# Patient Record
Sex: MALE | Race: WHITE | Employment: OTHER | URBAN - METROPOLITAN AREA
[De-identification: names, ages, dates, MRNs, and addresses within clinical notes are randomized per-mention and may not be internally consistent; named-entity substitution may affect disease eponyms.]

---

## 2017-01-04 ENCOUNTER — OFFICE VISIT (OUTPATIENT)
Dept: INTERNAL MEDICINE CLINIC | Facility: CLINIC | Age: 42
End: 2017-01-04

## 2017-01-04 VITALS
WEIGHT: 230.63 LBS | RESPIRATION RATE: 16 BRPM | DIASTOLIC BLOOD PRESSURE: 72 MMHG | SYSTOLIC BLOOD PRESSURE: 114 MMHG | TEMPERATURE: 98 F | HEIGHT: 73 IN | HEART RATE: 87 BPM | BODY MASS INDEX: 30.57 KG/M2 | OXYGEN SATURATION: 96 %

## 2017-01-04 DIAGNOSIS — E66.9 OBESITY (BMI 30-39.9): ICD-10-CM

## 2017-01-04 DIAGNOSIS — Z51.81 THERAPEUTIC DRUG MONITORING: Primary | ICD-10-CM

## 2017-01-04 DIAGNOSIS — Z86.69 HISTORY OF SLEEP APNEA: ICD-10-CM

## 2017-01-04 PROCEDURE — 99214 OFFICE O/P EST MOD 30 MIN: CPT | Performed by: NURSE PRACTITIONER

## 2017-01-04 PROCEDURE — 93000 ELECTROCARDIOGRAM COMPLETE: CPT | Performed by: NURSE PRACTITIONER

## 2017-01-04 RX ORDER — PHENTERMINE HYDROCHLORIDE 37.5 MG/1
37.5 TABLET ORAL
Qty: 30 TABLET | Refills: 0 | Status: SHIPPED | OUTPATIENT
Start: 2017-01-04 | End: 2017-03-09

## 2017-01-04 NOTE — PATIENT INSTRUCTIONS
Welcome to the [facility]. ..your Lifestyle Renovation begins now! Thank you for placing your trust in our health care team, I look forward to working with you along this journey to better health!     Next steps:     1.  Schedule a personal n another comparable alternative can be done in your home or place of work with little time commitment. Don't forget to schedule your 1 month follow-up visit!

## 2017-01-04 NOTE — PROGRESS NOTES
Tamika Prescott is a 35year old male new to our office today. Referred by PCP, ANA Spicer.    S:  Patient presents today with c/o weight gain starting in his 35s. Patient sees excess weight as having a mild effect on health and quality of life.  R 230 lb 9.6 oz  BMI 30.43 kg/m2  SpO2 96%, Percent body fat: M >25% 27.4%.   GENERAL: well developed, well nourished, in no apparent distress, obese  SKIN: warm, pink, dry without rashes to exposed area  EYES: conjunctiva pink, sclera non icteric, PERRLA  HE total) by mouth every morning before breakfast.  Dispense: 30 tablet; Refill: 0    3. History of sleep apnea  - resolved per patient report after surgery, no CPAP use        Reviewed [facility] patient contract.  Readiness for Lifestyle change: 10/10, Interest in little which would hinder weight loss. When you set the aidan up chose 1-2 lbs/week as a goal.  Keeping a paper food journal is an option as well. Continue or start exercising to help establish a routine.  If not already exercising begin with 1 day and pro

## 2017-01-12 ENCOUNTER — APPOINTMENT (OUTPATIENT)
Dept: LAB | Age: 42
End: 2017-01-12
Attending: NURSE PRACTITIONER
Payer: COMMERCIAL

## 2017-01-12 DIAGNOSIS — Z51.81 THERAPEUTIC DRUG MONITORING: ICD-10-CM

## 2017-01-12 DIAGNOSIS — E66.9 OBESITY (BMI 30-39.9): ICD-10-CM

## 2017-01-12 LAB
25-HYDROXYVITAMIN D (TOTAL): 22.7 NG/ML (ref 30–100)
BUN BLD-MCNC: 18 MG/DL (ref 8–20)
CALCIUM BLD-MCNC: 8.6 MG/DL (ref 8.3–10.3)
CHLORIDE: 106 MMOL/L (ref 101–111)
CO2: 28 MMOL/L (ref 22–32)
CREAT BLD-MCNC: 1.04 MG/DL (ref 0.7–1.3)
EST. AVERAGE GLUCOSE BLD GHB EST-MCNC: 117 MG/DL (ref 68–126)
GLUCOSE BLD-MCNC: 91 MG/DL (ref 70–99)
HAV AB SERPL IA-ACNC: 401 PG/ML (ref 193–986)
HBA1C MFR BLD HPLC: 5.7 % (ref ?–5.7)
HEMOLYSIS: 1
ICTERUS: 1
LIPEMIA: 1
POTASSIUM SERPL-SCNC: 4.4 MMOL/L (ref 3.6–5.1)
SODIUM SERPL-SCNC: 142 MMOL/L (ref 136–144)
TSI SER-ACNC: 0.99 MIU/ML (ref 0.35–5.5)

## 2017-01-12 PROCEDURE — 36415 COLL VENOUS BLD VENIPUNCTURE: CPT

## 2017-01-12 PROCEDURE — 83036 HEMOGLOBIN GLYCOSYLATED A1C: CPT

## 2017-01-12 PROCEDURE — 82397 CHEMILUMINESCENT ASSAY: CPT

## 2017-01-12 PROCEDURE — 80048 BASIC METABOLIC PNL TOTAL CA: CPT

## 2017-01-12 PROCEDURE — 82306 VITAMIN D 25 HYDROXY: CPT

## 2017-01-12 PROCEDURE — 82607 VITAMIN B-12: CPT

## 2017-01-12 PROCEDURE — 84443 ASSAY THYROID STIM HORMONE: CPT

## 2017-01-19 LAB — LEPTIN: 6.7 NG/ML

## 2017-01-23 ENCOUNTER — OFFICE VISIT (OUTPATIENT)
Dept: INTERNAL MEDICINE CLINIC | Facility: CLINIC | Age: 42
End: 2017-01-23

## 2017-01-23 VITALS — BODY MASS INDEX: 30 KG/M2 | WEIGHT: 229 LBS

## 2017-01-23 DIAGNOSIS — E66.9 OBESITY (BMI 30.0-34.9): ICD-10-CM

## 2017-01-23 PROCEDURE — 97802 MEDICAL NUTRITION INDIV IN: CPT | Performed by: DIETITIAN, REGISTERED

## 2017-01-23 NOTE — PROGRESS NOTES
INITIAL OUTPATIENT NUTRITION CONSULTATION    Nutrition Assessment    Medical Diagnosis: Obesity    Client Hx: 35year old male,  with son, works FT    Problem List as of 1/23/2017        Respiratory    BERTO (obstructive sleep apnea)       Mental Final   ----------    HDL CHOLESTEROL   Date Value Ref Range Status   10/18/2013 34* > OR = 40 mg/dL Final   ----------  HDL CHOL   Date Value Ref Range Status   12/02/2013 30* mg/dL Final   Comment:   <26    mg/dL  Highest CHD risk  25-35  mg/dL  High CHD Discussed importance of eating breakfast and then every 4-5 hours during the day. Sample breakfast options were listed. Is highly physically active. Drinking a protein shake after hockey rather than eating fast food was encouraged.   Portions on starches

## 2017-02-01 ENCOUNTER — OFFICE VISIT (OUTPATIENT)
Dept: INTERNAL MEDICINE CLINIC | Facility: CLINIC | Age: 42
End: 2017-02-01

## 2017-02-01 VITALS
RESPIRATION RATE: 16 BRPM | HEART RATE: 70 BPM | BODY MASS INDEX: 29.82 KG/M2 | WEIGHT: 225 LBS | HEIGHT: 73 IN | DIASTOLIC BLOOD PRESSURE: 60 MMHG | SYSTOLIC BLOOD PRESSURE: 106 MMHG

## 2017-02-01 DIAGNOSIS — E66.9 OBESITY (BMI 30-39.9): ICD-10-CM

## 2017-02-01 DIAGNOSIS — Z51.81 THERAPEUTIC DRUG MONITORING: Primary | ICD-10-CM

## 2017-02-01 DIAGNOSIS — R73.03 PREDIABETES: ICD-10-CM

## 2017-02-01 PROCEDURE — 99214 OFFICE O/P EST MOD 30 MIN: CPT | Performed by: NURSE PRACTITIONER

## 2017-02-01 RX ORDER — LACTOBACIL 2/BIFIDO 1/S.THERMO 450B CELL
1 PACKET (EA) ORAL DAILY
Qty: 60 CAPSULE | Refills: 3 | COMMUNITY
Start: 2017-02-01 | End: 2017-05-05

## 2017-02-01 NOTE — PROGRESS NOTES
Natanael Reyes is a 35year old male presents today for 1 month follow-up on medical weight loss program for the treatment of overweight, obesity, or morbid obesity. New onset prediabetes and Vitamin D deficiency.     S:  Current weight Wt Readings from Last Meds & Refills for this Visit:  Signed Prescriptions Disp Refills    Probiotic Product (VSL#3) Oral Cap 60 capsule 3      Sig: Take 1 capsule by mouth daily.            Imaging & Consults:  None      Plan:  Patient has lost 5# since LOV 1 month ago on phent You have been diagnosed with prediabetes. This means that the level of sugar (glucose) in your blood is too high. If you have prediabetes, you are at risk for developing type 2 diabetes.  Type 2 diabetes is diagnosed when the level of glucose in the blood r · Fasting glucose test. Blood is taken and tested after you have fasted (not eaten) for at least 8 hours. A normal test result is 99 milligrams per deciliter (mg/dL) or lower. Prediabetes is 100 mg/dL to 125 mg/dL. Diabetes is 126 mg/dL or higher.   · Gluco © 5195-9369 The [address] [place] [address], [address]. All rights reserved. This information is not intended as a substitute for professional medical care. Always follow your healthcare professional's instructions.           Weigh © 3442-9058 [address], [address]. All rights reserved. This information is not intended as a substitute for professional medical care. Always follow your healthcare professional's instructions.             Rick

## 2017-02-01 NOTE — PATIENT INSTRUCTIONS
Congratulations on your 5# weight loss! Continue making lifestyle changes that focus on good nutrition, regular exercise and stress management. Today we reviewed your labs with findings of: prediabetes, vitamin D deficiency.     Medication Plan: Irwin Pierson Prediabetes is a disease where the body’s cells have trouble using glucose in the blood for energy. As a result, too much glucose stays in the blood and can affect how your heart and blood vessels work.  Without changes in diet and lifestyle, the problem ca The best way to treat prediabetes is to lose at least 5% to 7% of your current weight and be more physically active by getting at least 30 minutes of exercise 5 days a week. These changes help the body’s cells use blood sugar better.  Even a small amount of Eat more fiber  High-fiber foods are digested more slowly than low-fiber foods, so you feel full longer. Try to get at least 25 grams of fiber each day for a 2000 calorie diet.  Foods high in fiber include:  · Vegetables and fruits  · Whole-grain or bran

## 2017-03-09 ENCOUNTER — OFFICE VISIT (OUTPATIENT)
Dept: INTERNAL MEDICINE CLINIC | Facility: CLINIC | Age: 42
End: 2017-03-09

## 2017-03-09 VITALS
BODY MASS INDEX: 29.03 KG/M2 | SYSTOLIC BLOOD PRESSURE: 120 MMHG | HEART RATE: 62 BPM | WEIGHT: 219 LBS | HEIGHT: 73 IN | RESPIRATION RATE: 16 BRPM | DIASTOLIC BLOOD PRESSURE: 80 MMHG

## 2017-03-09 DIAGNOSIS — Z51.81 THERAPEUTIC DRUG MONITORING: Primary | ICD-10-CM

## 2017-03-09 DIAGNOSIS — E66.9 OBESITY (BMI 30-39.9): ICD-10-CM

## 2017-03-09 PROCEDURE — 99213 OFFICE O/P EST LOW 20 MIN: CPT | Performed by: NURSE PRACTITIONER

## 2017-03-09 NOTE — PROGRESS NOTES
Lori French is a 35year old male presents today for 2 month follow-up on medical weight loss program for the treatment of overweight, obesity, or morbid obesity.   S:  Current weight Wt Readings from Last 6 Encounters:  03/09/17 : 219 lb  02/01/17 : 225 Patient has lost 6# since LOV 1 month ago with lifestyle changes with a total weight loss of 11# since initial consult on 1/4/2017 with initial weight of 230#. Lab hx: prediabetes, vitamin D deficiency.   Continue on lifestyle changes with diet and exercis Although daily physical activity, like walking, swimming and aerobics are essential to good heart health and weight management, combining muscle building weight training with cardiovascular exercise, gives you an unbeatable combination to lose fat and keep medical problems such as sleep apnea or hyperthyroidism   restless leg syndrome (muscles in your lower legs twitch or tense up during sleep).    use of caffeine or other stimulants   use of alcohol, other depressants, or sedatives, which can relax you but l your mental and physical condition   your medical and mental health history, and your family's history   your job and travel patterns. Your healthcare provider may also ask your spouse, bed partner, or other family members about your sleep habits.  After Your healthcare provider may recommend relaxation techniques, changes in diet, cutting out caffeine, and a healthy lifestyle that includes exercise. Your provider also will probably discuss good sleep habits and a regular sleep routine.    How long will the Try not to focus on falling asleep. For example, don't keep checking the clock and worry about why you are not asleep yet. If you are awake for more than 20 minutes, leave the bed and do not go back to bed until you feel ready to sleep.    Try to reduce str

## 2017-03-09 NOTE — PATIENT INSTRUCTIONS
Congratulations on your 6# weight loss! Continue making lifestyle changes that focus on good nutrition, regular exercise and stress management. Agreed upon goal/s before next office visit include:  Add resistance training 2x/week to help build muscle and Burn more calories. Unlike fat, muscle beefs up your metabolism to help you burn about 70% more calories than fat can. 2. Improve appearance. When done properly, strength training can greatly improve your posture and help to prevent joint pain.   3. Build (COPD) or heart failure   poor sleep habits, including going to bed at different times or in a noisy environment, or eating or working in bed before sleeping   changes in sleep patterns because of different work hours or travel (jet lag).    Insomnia may be you woke up   what time you got up in the morning   your thoughts about the quality of your sleep   whether anything unusual happened. Your healthcare provider may suggest that you sleep overnight in a sleep center.  At the sleep center you may have a con insomnia. Follow your provider's instructions for follow-up visits. How can I help prevent insomnia? Practice good sleep hygiene:   Establish a regular bedtime and wake-up time and stick to them even on weekends. Avoid taking naps.    Exercise regul reviewed periodically and is subject to change as new health information becomes available.  The information is intended to inform and educate and is not a replacement for medical evaluation, advice, diagnosis or treatment by a healthcare professional.   Ad

## 2017-04-05 ENCOUNTER — OFFICE VISIT (OUTPATIENT)
Dept: INTERNAL MEDICINE CLINIC | Facility: CLINIC | Age: 42
End: 2017-04-05

## 2017-04-05 VITALS
RESPIRATION RATE: 16 BRPM | BODY MASS INDEX: 29.42 KG/M2 | HEART RATE: 64 BPM | HEIGHT: 73 IN | WEIGHT: 222 LBS | SYSTOLIC BLOOD PRESSURE: 130 MMHG | DIASTOLIC BLOOD PRESSURE: 76 MMHG

## 2017-04-05 DIAGNOSIS — E66.9 OBESITY (BMI 30.0-34.9): ICD-10-CM

## 2017-04-05 DIAGNOSIS — Z51.81 THERAPEUTIC DRUG MONITORING: Primary | ICD-10-CM

## 2017-04-05 PROCEDURE — 99213 OFFICE O/P EST LOW 20 MIN: CPT | Performed by: NURSE PRACTITIONER

## 2017-04-05 NOTE — PATIENT INSTRUCTIONS
Continue making lifestyle changes that focus on good nutrition, regular exercise and stress management. Agreed upon goal/s before next office visit include: Pack healthy snack options during the day and note hunger cues.   Add resistance workouts 2x/week crackers  · Chocolate bars  · Cream-filled cookies  Date Last Reviewed: 6/8/2015  © 1563-1489 [address], [address] [place] [address]. All rights reserved.  This information is not intended as a substitute for professional medic hunger? Think of alternatives to eating for when these temptations arise.  Some ideas are:  Drink a glass of cold water or another zero-calorie beverage   Take a walk to change the scenery   Do another form of exercise (sit-ups, running, swimming, tennis,

## 2017-04-05 NOTE — PROGRESS NOTES
Natanael Reyes is a 38year old male presents today for 3 month follow-up on medical weight loss program for the treatment of overweight, obesity, or morbid obesity.     S:  Current weight Wt Readings from Last 6 Encounters:  04/05/17 : 222 lb  03/09/17 : 21 diagnosis)  Obesity (bmi 30.0-34.9)    No orders of the defined types were placed in this encounter.        Meds & Refills for this Visit:  No prescriptions requested or ordered in this encounter    Imaging & Consults:  None      Plan:  Patient has lost -3# you’re full. Pack snacks ahead of time  The body’s fuel runs out within several hours after eating a meal. If you don’t eat, you’ll feel your energy level drop. You may also notice that you’re less focused and alert.  Try packing snacks to bring with you t Irritability   Headache   Low energy/fatigue   Difficulty concentrating  How Does the Scale Work? Before you eat, take a moment to rate your hunger. Think about how hungry you physically feel. Your goal is to eat between levels 4 and 6.  This means you a month (around 5/5/2017) for weight management . Patient verbalizes understanding.

## 2017-05-05 ENCOUNTER — LAB ENCOUNTER (OUTPATIENT)
Dept: LAB | Age: 42
End: 2017-05-05
Attending: INTERNAL MEDICINE
Payer: COMMERCIAL

## 2017-05-05 ENCOUNTER — TELEPHONE (OUTPATIENT)
Dept: INTERNAL MEDICINE CLINIC | Facility: CLINIC | Age: 42
End: 2017-05-05

## 2017-05-05 ENCOUNTER — OFFICE VISIT (OUTPATIENT)
Dept: FAMILY MEDICINE CLINIC | Facility: CLINIC | Age: 42
End: 2017-05-05

## 2017-05-05 VITALS
RESPIRATION RATE: 16 BRPM | TEMPERATURE: 98 F | SYSTOLIC BLOOD PRESSURE: 134 MMHG | HEIGHT: 73 IN | WEIGHT: 222 LBS | HEART RATE: 64 BPM | BODY MASS INDEX: 29.42 KG/M2 | DIASTOLIC BLOOD PRESSURE: 82 MMHG

## 2017-05-05 DIAGNOSIS — R19.7 DIARRHEA DUE TO MALABSORPTION: ICD-10-CM

## 2017-05-05 DIAGNOSIS — K90.9 DIARRHEA DUE TO MALABSORPTION: ICD-10-CM

## 2017-05-05 DIAGNOSIS — F41.9 ANXIETY: ICD-10-CM

## 2017-05-05 DIAGNOSIS — Z00.01 ENCOUNTER FOR GENERAL ADULT MEDICAL EXAMINATION WITH ABNORMAL FINDINGS: Primary | ICD-10-CM

## 2017-05-05 DIAGNOSIS — G47.9 SLEEP DISORDER: ICD-10-CM

## 2017-05-05 DIAGNOSIS — J32.1 CHRONIC FRONTAL SINUSITIS: ICD-10-CM

## 2017-05-05 DIAGNOSIS — J30.2 SEASONAL ALLERGIC RHINITIS, UNSPECIFIED ALLERGIC RHINITIS TRIGGER: ICD-10-CM

## 2017-05-05 PROBLEM — E55.9 VITAMIN D DEFICIENCY: Status: ACTIVE | Noted: 2017-05-05

## 2017-05-05 PROCEDURE — 87209 SMEAR COMPLEX STAIN: CPT | Performed by: INTERNAL MEDICINE

## 2017-05-05 PROCEDURE — 87427 SHIGA-LIKE TOXIN AG IA: CPT | Performed by: INTERNAL MEDICINE

## 2017-05-05 PROCEDURE — 87493 C DIFF AMPLIFIED PROBE: CPT | Performed by: INTERNAL MEDICINE

## 2017-05-05 PROCEDURE — 99396 PREV VISIT EST AGE 40-64: CPT | Performed by: INTERNAL MEDICINE

## 2017-05-05 PROCEDURE — 99214 OFFICE O/P EST MOD 30 MIN: CPT | Performed by: INTERNAL MEDICINE

## 2017-05-05 PROCEDURE — 87045 FECES CULTURE AEROBIC BACT: CPT | Performed by: INTERNAL MEDICINE

## 2017-05-05 PROCEDURE — 87269 GIARDIA AG IF: CPT | Performed by: INTERNAL MEDICINE

## 2017-05-05 PROCEDURE — 87177 OVA AND PARASITES SMEARS: CPT | Performed by: INTERNAL MEDICINE

## 2017-05-05 PROCEDURE — 87046 STOOL CULTR AEROBIC BACT EA: CPT | Performed by: INTERNAL MEDICINE

## 2017-05-05 RX ORDER — IPRATROPIUM BROMIDE 42 UG/1
2 SPRAY, METERED NASAL 4 TIMES DAILY
Qty: 15 ML | Refills: 0 | Status: SHIPPED | OUTPATIENT
Start: 2017-05-05 | End: 2017-12-11 | Stop reason: ALTCHOICE

## 2017-05-05 RX ORDER — CLONAZEPAM 0.5 MG/1
0.5 TABLET ORAL NIGHTLY PRN
Qty: 30 TABLET | Refills: 3 | Status: SHIPPED | COMMUNITY
Start: 2017-05-05 | End: 2017-12-06

## 2017-05-05 RX ORDER — METHYLPREDNISOLONE 4 MG/1
TABLET ORAL
Qty: 1 KIT | Refills: 0 | Status: SHIPPED | OUTPATIENT
Start: 2017-05-05 | End: 2017-08-22

## 2017-05-05 RX ORDER — LORATADINE AND PSEUDOEPHEDRINE 10; 240 MG/1; MG/1
1 TABLET, EXTENDED RELEASE ORAL DAILY
Qty: 20 TABLET | Refills: 1 | Status: SHIPPED | OUTPATIENT
Start: 2017-05-05 | End: 2017-12-11 | Stop reason: ALTCHOICE

## 2017-05-05 RX ORDER — AZITHROMYCIN 250 MG/1
TABLET, FILM COATED ORAL
Qty: 8 TABLET | Refills: 0 | Status: SHIPPED | OUTPATIENT
Start: 2017-05-05 | End: 2017-08-22

## 2017-05-05 NOTE — PATIENT INSTRUCTIONS
Thank you for choosing Cira Whittington MD at [place] [address]  To Do: Lori Saliva  1. Sleep meds resume clonazepam  2. Sinus meds sent to [facility]  3. Earwax L ear- use debrox drops and see ent dr Hansel Haro if not better  4. Stool test  5.  See gi doc dr David Martines outcomes when eszopiclone (2 and 3 mg) and zolpidem were compared directly.   When selecting among various sedative-hypnotic medications, we suggest choosing on the basis of the type of insomnia (ie, sleep onset or sleep maintenance) and the duration of eff movement (REM) sleep [64]. In addition, they decrease anxiety, impair memory, and have anticonvulsive properties.   Benzodiazepines commonly used for the treatment of insomnia include triazolam, estazolam, lorazepam, temazepam, flurazepam, and quazepam. A p placebo. Nonbenzodiazepines commonly used to treat insomnia include zaleplon, zolpidem, eszopiclone, and zolpidem extended release (table 6): ? Zaleplon has a very short half-life of about one hour.  As a result, it is effective for patients who have diffi zolpidem extended release are relatively few, with the most common being headache, somnolence, and dizziness, which can in turn lead to falls [63].  In January of 2013, the FDA recommended use of a lower dose in women than had been previously recommended [7 eszopiclone for six months had improved quality of life, decreased work limitation, and improved sleep compared with placebo [76]. This persisted throughout the trial and the subsequent six month open label extension.  In another randomized trial of 1018 pa non-24-hour sleep-wake disorder, a circadian sleep-wake rhythm disorder that occurs primarily in blind individuals [87,88].   Ramelteon binds to melatonin receptors expressed in the suprachiasmatic nucleus with much higher affinity than melatonin itself and year of treatment; improvements in subjective sleep persisted at one year. The most common adverse effect was somnolence, which was more common in patients treated with suvorexant than placebo (13 versus 3 percent).  There was a trend toward rebound insomni recommended for routine use in patients with insomnia. These include antidepressants, diphenhydramine, antipsychotics, and barbiturates.   Antidepressants — One antidepressant, doxepin, has been approved by the FDA at doses of 3 and 6 mg primarily for the t guideline includes doxepin among the drugs listed as options for management sleep maintenance in chronic insomnia but suggests against use of trazodone based on paucity of data and the small effect sizes observed in the single randomized trial [59].    Diph between any herbal medicine and placebo on any of 13 clinical efficacy measures of insomnia [101]. The majority of the trials (11 out of 14) studied valerian; chamomile, kava, and wuling were studied in one trial each.  Unlike the other herbals studied, michelle insomnia may occur when some short-acting medications are discontinued.  Less common adverse effects include complex sleep-related behaviors (eg, sleep walking, driving, making telephone calls, eating, or having sex while not fully awake), anterograde amnes pneumonia associated with benzodiazepines and the nonbenzodiazepine zopiclone, possibly related to modification of ALEXANDR type A activity during infection [113,114].  A study in mice suggested that this risk could extend to all hypnotics that act at this site communication was issued for eszopiclone, based on data that the 2 and 3 mg doses may be associated with impairment of driving skills, memory, and coordination lasting more than 11 hours without subjective awareness in some patients [115].  A starting dose 26 patients with mild to moderate obstructive sleep apnea were given 40 mg of suvorexant or placebo for four consecutive nights in a crossover design, and the mean apnea-hypopnea index (AHI) increased by a mean of 2.7 (95% CI 0.2-5.1) after multiple doses potential confounders [132,133].   One of the larger studies suggested that hypnotic drugs (including frequently prescribed agents such as zolpidem and temazepam) were associated with an increased risk of both cancer and death, even at prescription levels o benzodiazepines and nonbenzodiazepines, while inducers of the CY system (eg, rifampin) may decrease the effectiveness of benzodiazepines and nonbenzodiazepines.   Ramelteon is metabolized by the CY system and, to a lesser extent, the CYP2C9 and CYP3 insomnia to receive CBT-I plus zolpidem or CBT-I alone for six weeks [135]. Both groups had decreased sleep onset latency, decreased wake time after sleep onset, and increased sleep efficiency when compared with baseline after six weeks.  However, there was therapy. • Please signup for MY CHART, which is electronic access to your record if you have not done so.  All your results will post on there.  [email or web address]  • You can NOW use the SocialMarthart to book your appointments with us, or consider request.    If Randell Berry MD orders a CT or MRI, it may take up to 10 business days to receive approval from your insurance company.  Once our office has called informing you that the insurance company approved your testing, please call [facility]

## 2017-05-05 NOTE — PROGRESS NOTES
Wellness Exam    REASON FOR VISIT:    Silvana Sacks is a 38year old male who presents for an [address]     Current Complaints: i'm just here for a physical  Flu Shot: declined   Health Maintenance Topics with due status: Overdue       Topic Opener : No    Scoring  Total Score: 0     PHQ-4: Over the LAST 2 WEEKS       Depression Screening (PHQ-2/PHQ-9): Over the LAST 2 WEEKS   Little interest or pleasure in doing things (over the last two weeks)?: Not at all    Feeling down, depressed, or hope tablet (0.5 mg total) by mouth nightly as needed for Anxiety (sleep).  Disp: 30 tablet Rfl: 3   azithromycin 250 MG Oral Tab 7 day course, 2 on day 1 then 1 daily after Disp: 8 tablet Rfl: 0   Loratadine-Pseudoephedrine ER (CLARITIN-D 24 HOUR)  MG Ora difficulty urinating. Musculoskeletal: Negative for arthralgias and no gait problem. Skin: Negative for color change and rash. Neurological: Negative for tremors, weakness and numbness. Hematological: Negative for adenopathy.  Does not bruise/bleed and ordered as follows:  Diagnoses and all orders for this visit:    Wellness  Labs reviewed  Normal  rec weight loss    Chronic frontal sinusitis  -     azithromycin 250 MG Oral Tab; 7 day course, 2 on day 1 then 1 daily after  -     Loratadine-Pseudoephe 08/09/2013, 09/06/2013   • HEP B, Adult 02/24/2014   • Tb Intradermal Test 08/02/2013, 08/09/2013       Influenza Annually   Pneumococcal if high risk   Td/Tdap once then every 10 years   HPV Males 11-21   Zoster (Shingles) 60 and older: one dose   Varicel Smoker                   Packs/Day: 0.00  Years:           Quit date: 08/01/2013    Smokeless Status: Never Used                        Alcohol Use: Yes                Comment: RARE    REVIEW OF SYSTEMS:   Review of Systems  Constitutional: Negative for fe hair  Psychiatric: anxiety  EXAM:   /82 mmHg  Pulse 64  Temp(Src) 97.5 °F (36.4 °C) (Temporal)  Resp 16  Ht 73\"  Wt 222 lb  BMI 29.30 kg/m2 Estimated body mass index is 29.3 kg/(m^2) as calculated from the following:    Height as of this encounter: 0.5 MG Oral Tab; Take 1 tablet (0.5 mg total) by mouth nightly as needed for Anxiety (sleep). No orders of the defined types were placed in this encounter.        Signed Prescriptions Disp Refills    ClonazePAM 0.5 MG Oral Tab 30 tablet 3      Sig: T Lab Facility: [id number]

## 2017-08-02 ENCOUNTER — TELEPHONE (OUTPATIENT)
Dept: FAMILY MEDICINE CLINIC | Facility: CLINIC | Age: 42
End: 2017-08-02

## 2017-08-02 PROBLEM — F51.01 PRIMARY INSOMNIA: Status: ACTIVE | Noted: 2017-08-02

## 2017-08-22 ENCOUNTER — OFFICE VISIT (OUTPATIENT)
Dept: FAMILY MEDICINE CLINIC | Facility: CLINIC | Age: 42
End: 2017-08-22

## 2017-08-22 VITALS
DIASTOLIC BLOOD PRESSURE: 76 MMHG | SYSTOLIC BLOOD PRESSURE: 122 MMHG | HEIGHT: 73 IN | TEMPERATURE: 98 F | HEART RATE: 74 BPM | RESPIRATION RATE: 16 BRPM | WEIGHT: 228 LBS | BODY MASS INDEX: 30.22 KG/M2

## 2017-08-22 DIAGNOSIS — R10.2 PELVIC PAIN: Primary | ICD-10-CM

## 2017-08-22 DIAGNOSIS — K90.9 DIARRHEA DUE TO MALABSORPTION: ICD-10-CM

## 2017-08-22 DIAGNOSIS — M25.552 LEFT HIP PAIN: ICD-10-CM

## 2017-08-22 DIAGNOSIS — M25.562 CHRONIC PAIN OF LEFT KNEE: ICD-10-CM

## 2017-08-22 DIAGNOSIS — R19.7 DIARRHEA DUE TO MALABSORPTION: ICD-10-CM

## 2017-08-22 DIAGNOSIS — G89.29 CHRONIC PAIN OF LEFT KNEE: ICD-10-CM

## 2017-08-22 PROCEDURE — 99214 OFFICE O/P EST MOD 30 MIN: CPT | Performed by: INTERNAL MEDICINE

## 2017-08-22 RX ORDER — DICLOFENAC SODIUM 75 MG/1
75 TABLET, DELAYED RELEASE ORAL 2 TIMES DAILY
Qty: 30 TABLET | Refills: 0 | Status: SHIPPED | OUTPATIENT
Start: 2017-08-22 | End: 2017-12-11 | Stop reason: ALTCHOICE

## 2017-08-22 NOTE — PROGRESS NOTES
[facility] Internal Medicine Office Note  Chief Complaint:   Patient presents with:   Follow - Up: groin pain  Knee Pain: Left knee x 1 month  Hip Pain: left side   diarrhea    HPI:   This is a 38year old male coming in for  HPI     Explosive rodri tablet Rfl: 1   Ipratropium Bromide (ATROVENT) 0.06 % Nasal Solution 2 sprays by Nasal route 4 (four) times daily. Disp: 15 mL Rfl: 0   clonazePAM 0.25 MG Oral Tablet Dispersible Take 1 tablet (0.25 mg total) by mouth nightly as needed.  Disp: 30 tablet Rfl knee acute on chronci pain ? OA check xray and refer to PT and ortho  -     XR KNEE (1 OR 2 VIEWS), LEFT (CPT=73560);  Future  -     OP REFERRAL TO [facility]  -     ORTHOPEDIC - INTERNAL    L hip pain new from strain of hip  rec nsaid b

## 2017-08-22 NOTE — PATIENT INSTRUCTIONS
Thank you for choosing Pia Jasmine MD at [place] [address]  To Do: Liam Lacrosse  1.  Joints  L knee pain likely from meniscal injury  Recommend xray Call central scheduling [phone] to schedule your test.  Most tests are done in Building [facility] inside E example: Holter Monitor, Cardiac Stress tests,Event Monitor, or 2D Echocardiograms)  •[facility] call [phone] usually in [address] [facility] in Clinic in [place] at [facility].  [address] Mon-Fri at 8am-7:30pm, and Sat/Sun 9am-430pm  Also at 2855 patients receiving maintenance medications need to be seen at least twice a year.

## 2017-08-23 ENCOUNTER — HOSPITAL ENCOUNTER (OUTPATIENT)
Dept: ULTRASOUND IMAGING | Age: 42
Discharge: HOME | End: 2017-08-23
Attending: INTERNAL MEDICINE
Payer: COMMERCIAL

## 2017-08-23 ENCOUNTER — HOSPITAL ENCOUNTER (OUTPATIENT)
Dept: GENERAL RADIOLOGY | Age: 42
Discharge: HOME | End: 2017-08-23
Attending: INTERNAL MEDICINE
Payer: COMMERCIAL

## 2017-08-23 DIAGNOSIS — M25.562 CHRONIC PAIN OF LEFT KNEE: ICD-10-CM

## 2017-08-23 DIAGNOSIS — R10.2 PELVIC PAIN: ICD-10-CM

## 2017-08-23 DIAGNOSIS — G89.29 CHRONIC PAIN OF LEFT KNEE: ICD-10-CM

## 2017-08-23 PROCEDURE — 73560 X-RAY EXAM OF KNEE 1 OR 2: CPT | Performed by: INTERNAL MEDICINE

## 2017-08-23 PROCEDURE — 76882 US LMTD JT/FCL EVL NVASC XTR: CPT | Performed by: INTERNAL MEDICINE

## 2017-08-24 PROBLEM — K40.90 RIGHT INGUINAL HERNIA: Status: ACTIVE | Noted: 2017-08-24

## 2017-08-24 PROBLEM — M17.11 ARTHRITIS OF RIGHT KNEE: Status: ACTIVE | Noted: 2017-08-24

## 2017-08-28 PROBLEM — M25.562 PATELLOFEMORAL JOINT PAIN, LEFT: Status: ACTIVE | Noted: 2017-08-28

## 2017-08-29 ENCOUNTER — OFFICE VISIT (OUTPATIENT)
Dept: SURGERY | Facility: CLINIC | Age: 42
End: 2017-08-29

## 2017-08-29 VITALS
BODY MASS INDEX: 29.82 KG/M2 | WEIGHT: 225 LBS | RESPIRATION RATE: 16 BRPM | DIASTOLIC BLOOD PRESSURE: 84 MMHG | HEART RATE: 76 BPM | SYSTOLIC BLOOD PRESSURE: 129 MMHG | HEIGHT: 73 IN

## 2017-08-29 DIAGNOSIS — G47.33 OSA (OBSTRUCTIVE SLEEP APNEA): ICD-10-CM

## 2017-08-29 DIAGNOSIS — K40.90 RIGHT INGUINAL HERNIA: Primary | ICD-10-CM

## 2017-08-29 PROCEDURE — 99243 OFF/OP CNSLTJ NEW/EST LOW 30: CPT | Performed by: SURGERY

## 2017-08-29 NOTE — H&P
New Patient Visit Note       Active Problems      1. Right inguinal hernia    2. BERTO (obstructive sleep apnea)        Chief Complaint   Patient presents with:  Hernia: NW PT ref by Dr Pattie Corbett for RT ing hernia- US done 8/23.  PT has LQ ABD PN (7/10) that radiat Packs/day: 0.00      Years: 0.00           Quit date: 8/1/2013    Smokeless tobacco: Never Used                        Alcohol use: Yes                Comment: RARE    Drug use:  No                 Current Outpatien 73\"   Wt 225 lb   BMI 29.69 kg/m²   Physical Exam   Constitutional: He appears well-developed and well-nourished. No distress. HENT:   Head: Normocephalic. Neck: Normal range of motion. No tracheal deviation present.    Cardiovascular: Normal rate and

## 2017-09-19 ENCOUNTER — TELEPHONE (OUTPATIENT)
Dept: FAMILY MEDICINE CLINIC | Facility: CLINIC | Age: 42
End: 2017-09-19

## 2017-09-19 NOTE — TELEPHONE ENCOUNTER
Pt sts not able to make apt today at 11:00am with Dr Vincenzo Mayes due to scheduling issue.   Pt did not want to reschedule, 3rd No show

## 2017-10-06 ENCOUNTER — OFFICE VISIT (OUTPATIENT)
Dept: SURGERY | Facility: CLINIC | Age: 42
End: 2017-10-06

## 2017-10-06 VITALS
BODY MASS INDEX: 29.82 KG/M2 | HEIGHT: 73 IN | SYSTOLIC BLOOD PRESSURE: 124 MMHG | TEMPERATURE: 98 F | HEART RATE: 62 BPM | WEIGHT: 225 LBS | DIASTOLIC BLOOD PRESSURE: 84 MMHG

## 2017-10-06 DIAGNOSIS — K40.90 RIGHT INGUINAL HERNIA: Primary | ICD-10-CM

## 2017-10-06 PROCEDURE — 99024 POSTOP FOLLOW-UP VISIT: CPT | Performed by: SURGERY

## 2017-10-06 NOTE — PROGRESS NOTES
Post Operative Visit Note       Active Problems  1. Right inguinal hernia         Chief Complaint   Patient presents with:  Post-Op: 9/14 inguinal hernia repair, minimal pain         History of Present Illness   Doing well. No pain. Back to work.   Normal for Anxiety (sleep). Disp: 30 tablet Rfl: 3   Diclofenac Sodium 75 MG Oral Tab EC Take 1 tablet (75 mg total) by mouth 2 (two) times daily.  Disp: 30 tablet Rfl: 0   Loratadine-Pseudoephedrine ER (CLARITIN-D 24 HOUR)  MG Oral Tablet 24 Hr Take 1 table Right inguinal hernia  (primary encounter diagnosis)    Doing well s/p lap RIH repair with mesh. Plan   · Do not lift more than 20 pounds for 2 weeks more. · Follow-up as needed. · All questions answered.          No orders of the defined types were

## 2017-12-06 NOTE — TELEPHONE ENCOUNTER
Requesting Clonazepam 0.5 mg  LOV: 8/22/17   RTC: 1 mos  Last Labs: n/a  Filled: 05/05/17 #30 with 3 refills    No future appointments. Please contact pt, pt is overdue for OV.  Thank you    [facility] website shows, last filled 9/19/2017 CLONAZEPAM 0.5M Supp

## 2017-12-08 RX ORDER — CLONAZEPAM 0.5 MG/1
0.5 TABLET ORAL NIGHTLY PRN
Qty: 30 TABLET | Refills: 1 | OUTPATIENT
Start: 2017-12-08 | End: 2017-12-11

## 2017-12-08 NOTE — TELEPHONE ENCOUNTER
Future Appointments  Date Time Provider Jorge Alberto Melchor   12/11/2017 12:00 PM Sky Todd PA-C [facility] [address] [facility] [address]

## 2017-12-11 ENCOUNTER — OFFICE VISIT (OUTPATIENT)
Dept: FAMILY MEDICINE CLINIC | Facility: CLINIC | Age: 42
End: 2017-12-11

## 2017-12-11 ENCOUNTER — LAB ENCOUNTER (OUTPATIENT)
Dept: LAB | Age: 42
End: 2017-12-11
Attending: PHYSICIAN ASSISTANT
Payer: COMMERCIAL

## 2017-12-11 VITALS
TEMPERATURE: 99 F | HEART RATE: 64 BPM | WEIGHT: 228 LBS | SYSTOLIC BLOOD PRESSURE: 122 MMHG | HEIGHT: 73 IN | RESPIRATION RATE: 16 BRPM | BODY MASS INDEX: 30.22 KG/M2 | DIASTOLIC BLOOD PRESSURE: 80 MMHG

## 2017-12-11 DIAGNOSIS — F51.01 PRIMARY INSOMNIA: ICD-10-CM

## 2017-12-11 DIAGNOSIS — Z01.89 ROUTINE LAB DRAW: ICD-10-CM

## 2017-12-11 DIAGNOSIS — R22.41 ANKLE MASS, RIGHT: Primary | ICD-10-CM

## 2017-12-11 DIAGNOSIS — R73.01 IFG (IMPAIRED FASTING GLUCOSE): ICD-10-CM

## 2017-12-11 DIAGNOSIS — E78.5 DYSLIPIDEMIA: ICD-10-CM

## 2017-12-11 PROCEDURE — 84439 ASSAY OF FREE THYROXINE: CPT | Performed by: PHYSICIAN ASSISTANT

## 2017-12-11 PROCEDURE — 80061 LIPID PANEL: CPT | Performed by: PHYSICIAN ASSISTANT

## 2017-12-11 PROCEDURE — 99214 OFFICE O/P EST MOD 30 MIN: CPT | Performed by: PHYSICIAN ASSISTANT

## 2017-12-11 PROCEDURE — 83036 HEMOGLOBIN GLYCOSYLATED A1C: CPT | Performed by: PHYSICIAN ASSISTANT

## 2017-12-11 PROCEDURE — 80050 GENERAL HEALTH PANEL: CPT | Performed by: PHYSICIAN ASSISTANT

## 2017-12-11 PROCEDURE — 36415 COLL VENOUS BLD VENIPUNCTURE: CPT | Performed by: PHYSICIAN ASSISTANT

## 2017-12-11 RX ORDER — CLONAZEPAM 0.5 MG/1
TABLET ORAL
Qty: 30 TABLET | Status: CANCELLED | OUTPATIENT
Start: 2017-12-11

## 2017-12-11 RX ORDER — CLONAZEPAM 0.5 MG/1
0.5 TABLET ORAL NIGHTLY PRN
Qty: 30 TABLET | Refills: 1 | Status: SHIPPED | OUTPATIENT
Start: 2017-12-11 | End: 2018-04-26

## 2017-12-11 NOTE — PROGRESS NOTES
[facility] Internal Medicine Progress Note    CC:  Patient presents with:   Anxiety      HPI:   HPI  Insomnia  Pt is here today to follow-up on his insomnia  He is taking clonazepam nightly as needed  He states it is working well, some mornings he 98.5 °F (36.9 °C) (Temporal)   Resp 16   Ht 73\"   Wt 228 lb   BMI 30.08 kg/m²  Body mass index is 30.08 kg/m². Physical Exam   Constitutional: He is oriented to person, place, and time and well-developed, well-nourished, and in no distress.    HENT:   Rig Patient/Caregiver Education: Patient/Caregiver Education: There are no barriers to learning. Medical education done. Outcome: Patient verbalizes understanding.     Problem List:  Patient Active Problem List:     GERD (gastroesophageal reflux disease)

## 2017-12-11 NOTE — PATIENT INSTRUCTIONS
Thank you for choosing Yessy Hilario PA-C at [place] [address]  To Do: [place]  1. Continue medications  2. US of ankle  3. Get lab work done  4.  Follow-up in 6 months  Effective 6/19/17 until November 2017  Due to Golden lopez risk of harm or side effects or medication interactions.  It is your duty and for your safety to discuss with the pharmacist and our office with questions, and to notify us and stop treatment if problems arise, but know that our intention is that the benef

## 2017-12-12 NOTE — TELEPHONE ENCOUNTER
Spoke to [facility]  pharmacist at [facility], called in prescription for Clonazepam 0.5mg, pharmacists verbalized understanding with intent to inform pt when ready to be picked up.      Medication Quantity Refills Start End   ClonazePAM 0.5 MG Oral Tab 30 ta

## 2017-12-13 DIAGNOSIS — R74.8 ELEVATED LIVER ENZYMES: Primary | ICD-10-CM

## 2017-12-13 NOTE — PROGRESS NOTES
A1C is stable, still consistent with pre diabetes  Elevated cholesterol a little worse than last year, recommend low carb diet and exercise  Elevated liver enzyme, needs the following test  CMP  HCV antibody  Hep B core antibody  Ceruloplasmin  Iron and TI

## 2017-12-20 ENCOUNTER — LAB ENCOUNTER (OUTPATIENT)
Dept: LAB | Facility: HOSPITAL | Age: 42
End: 2017-12-20
Attending: PHYSICIAN ASSISTANT
Payer: COMMERCIAL

## 2017-12-20 ENCOUNTER — HOSPITAL ENCOUNTER (OUTPATIENT)
Dept: ULTRASOUND IMAGING | Facility: HOSPITAL | Age: 42
Discharge: HOME | End: 2017-12-20
Attending: PHYSICIAN ASSISTANT
Payer: COMMERCIAL

## 2017-12-20 DIAGNOSIS — R74.8 ELEVATED LIVER ENZYMES: ICD-10-CM

## 2017-12-20 DIAGNOSIS — R22.41 ANKLE MASS, RIGHT: ICD-10-CM

## 2017-12-20 PROCEDURE — 36415 COLL VENOUS BLD VENIPUNCTURE: CPT

## 2017-12-20 PROCEDURE — 86709 HEPATITIS A IGM ANTIBODY: CPT

## 2017-12-20 PROCEDURE — 76882 US LMTD JT/FCL EVL NVASC XTR: CPT | Performed by: PHYSICIAN ASSISTANT

## 2017-12-20 PROCEDURE — 86803 HEPATITIS C AB TEST: CPT

## 2017-12-20 PROCEDURE — 83550 IRON BINDING TEST: CPT

## 2017-12-20 PROCEDURE — 76700 US EXAM ABDOM COMPLETE: CPT | Performed by: PHYSICIAN ASSISTANT

## 2017-12-20 PROCEDURE — 80053 COMPREHEN METABOLIC PANEL: CPT

## 2017-12-20 PROCEDURE — 86704 HEP B CORE ANTIBODY TOTAL: CPT

## 2017-12-20 PROCEDURE — 86706 HEP B SURFACE ANTIBODY: CPT

## 2017-12-20 PROCEDURE — 82105 ALPHA-FETOPROTEIN SERUM: CPT

## 2017-12-20 PROCEDURE — 83516 IMMUNOASSAY NONANTIBODY: CPT

## 2017-12-20 PROCEDURE — 87340 HEPATITIS B SURFACE AG IA: CPT

## 2017-12-20 PROCEDURE — 82728 ASSAY OF FERRITIN: CPT

## 2017-12-20 PROCEDURE — 86708 HEPATITIS A ANTIBODY: CPT

## 2017-12-20 PROCEDURE — 82390 ASSAY OF CERULOPLASMIN: CPT

## 2017-12-20 PROCEDURE — 83540 ASSAY OF IRON: CPT

## 2017-12-26 NOTE — PROGRESS NOTES
See results below. Spoke with patient regarding US results. Patient aware, all questions answered.  Patient verbalized understanding     Total time spent on chart: 4 mins      Referral placed (2)   Dr. Damaris YEPEZ.WebsiteDirections  Gastroenterologist  A

## 2017-12-26 NOTE — PROGRESS NOTES
+ fatty liver  There is also an area that was hard to visualize due to fatty liver, possible cyst vs hemangioma  Pt should follow-up with Dr. Lisandro Cao liver specialist

## 2018-01-17 ENCOUNTER — OFFICE VISIT (OUTPATIENT)
Dept: FAMILY MEDICINE CLINIC | Facility: CLINIC | Age: 43
End: 2018-01-17

## 2018-01-17 VITALS
WEIGHT: 221 LBS | HEART RATE: 76 BPM | OXYGEN SATURATION: 100 % | TEMPERATURE: 98 F | DIASTOLIC BLOOD PRESSURE: 70 MMHG | SYSTOLIC BLOOD PRESSURE: 124 MMHG | RESPIRATION RATE: 12 BRPM | HEIGHT: 73 IN | BODY MASS INDEX: 29.29 KG/M2

## 2018-01-17 DIAGNOSIS — R63.4 WEIGHT LOSS: Primary | ICD-10-CM

## 2018-01-17 DIAGNOSIS — R14.0 BLOATING: ICD-10-CM

## 2018-01-17 DIAGNOSIS — R63.0 LOSS OF APPETITE: ICD-10-CM

## 2018-01-17 PROCEDURE — 99213 OFFICE O/P EST LOW 20 MIN: CPT | Performed by: FAMILY MEDICINE

## 2018-01-17 NOTE — PROGRESS NOTES
HPI:    Patient ID: Benjy Rojas is a 38year old male. HPI  Patient states over the past couple of weeks he had lost some weight. He is not trying to lose weight intentionally.   Patient presents with:  Stomach Pain: patient states he gets \"ful x-ray.  I would recommend monitoring her weight and recheck in 1 month if all testing comes back okay. Orders Placed This Encounter      CBC, Platelet;  No Differential      Comp Metabolic Panel (14)    Meds This Visit:  No prescriptions requested or ord

## 2018-01-18 PROBLEM — R63.4 WEIGHT LOSS: Status: ACTIVE | Noted: 2018-01-18

## 2018-01-18 PROBLEM — R63.0 LOSS OF APPETITE: Status: ACTIVE | Noted: 2018-01-18

## 2018-01-18 PROBLEM — R14.0 BLOATING: Status: ACTIVE | Noted: 2018-01-18

## 2018-01-29 ENCOUNTER — LAB ENCOUNTER (OUTPATIENT)
Dept: LAB | Age: 43
End: 2018-01-29
Attending: FAMILY MEDICINE
Payer: COMMERCIAL

## 2018-01-29 DIAGNOSIS — R63.4 WEIGHT LOSS: ICD-10-CM

## 2018-01-29 LAB
ALBUMIN SERPL-MCNC: 4 G/DL (ref 3.5–4.8)
ALP LIVER SERPL-CCNC: 95 U/L (ref 45–117)
ALT SERPL-CCNC: 54 U/L (ref 17–63)
AST SERPL-CCNC: 27 U/L (ref 15–41)
BILIRUB SERPL-MCNC: 0.9 MG/DL (ref 0.1–2)
BUN BLD-MCNC: 15 MG/DL (ref 8–20)
CALCIUM BLD-MCNC: 8.8 MG/DL (ref 8.3–10.3)
CHLORIDE: 107 MMOL/L (ref 101–111)
CO2: 27 MMOL/L (ref 22–32)
CREAT BLD-MCNC: 1.09 MG/DL (ref 0.7–1.3)
ERYTHROCYTE [DISTWIDTH] IN BLOOD BY AUTOMATED COUNT: 13.5 % (ref 11.5–16)
GLUCOSE BLD-MCNC: 76 MG/DL (ref 70–99)
HCT VFR BLD AUTO: 43.1 % (ref 37–53)
HGB BLD-MCNC: 14.5 G/DL (ref 13–17)
M PROTEIN MFR SERPL ELPH: 6.9 G/DL (ref 6.1–8.3)
MCH RBC QN AUTO: 31.7 PG (ref 27–33.2)
MCHC RBC AUTO-ENTMCNC: 33.6 G/DL (ref 31–37)
MCV RBC AUTO: 94.3 FL (ref 80–99)
PLATELET # BLD AUTO: 289 10(3)UL (ref 150–450)
POTASSIUM SERPL-SCNC: 4.5 MMOL/L (ref 3.6–5.1)
RBC # BLD AUTO: 4.57 X10(6)UL (ref 4.3–5.7)
RED CELL DISTRIBUTION WIDTH-SD: 46.1 FL (ref 35.1–46.3)
SODIUM SERPL-SCNC: 141 MMOL/L (ref 136–144)
WBC # BLD AUTO: 5.6 X10(3) UL (ref 4–13)

## 2018-01-29 PROCEDURE — 85027 COMPLETE CBC AUTOMATED: CPT | Performed by: FAMILY MEDICINE

## 2018-01-29 PROCEDURE — 80053 COMPREHEN METABOLIC PANEL: CPT | Performed by: FAMILY MEDICINE

## 2018-01-29 PROCEDURE — 36415 COLL VENOUS BLD VENIPUNCTURE: CPT | Performed by: FAMILY MEDICINE

## 2018-02-08 PROBLEM — M25.571 ACUTE RIGHT ANKLE PAIN: Status: ACTIVE | Noted: 2018-02-08

## 2018-03-14 ENCOUNTER — HOSPITAL ENCOUNTER (OUTPATIENT)
Dept: GENERAL RADIOLOGY | Age: 43
Discharge: HOME | End: 2018-03-14
Attending: PHYSICIAN ASSISTANT
Payer: COMMERCIAL

## 2018-03-14 ENCOUNTER — LAB ENCOUNTER (OUTPATIENT)
Dept: LAB | Age: 43
End: 2018-03-14
Attending: PHYSICIAN ASSISTANT
Payer: COMMERCIAL

## 2018-03-14 ENCOUNTER — OFFICE VISIT (OUTPATIENT)
Dept: FAMILY MEDICINE CLINIC | Facility: CLINIC | Age: 43
End: 2018-03-14

## 2018-03-14 VITALS
TEMPERATURE: 98 F | RESPIRATION RATE: 16 BRPM | DIASTOLIC BLOOD PRESSURE: 80 MMHG | WEIGHT: 204 LBS | BODY MASS INDEX: 27.04 KG/M2 | SYSTOLIC BLOOD PRESSURE: 124 MMHG | HEART RATE: 60 BPM | HEIGHT: 73 IN

## 2018-03-14 DIAGNOSIS — R63.4 UNINTENTIONAL WEIGHT LOSS: ICD-10-CM

## 2018-03-14 DIAGNOSIS — R63.4 UNINTENTIONAL WEIGHT LOSS: Primary | ICD-10-CM

## 2018-03-14 DIAGNOSIS — J01.00 ACUTE MAXILLARY SINUSITIS, RECURRENCE NOT SPECIFIED: ICD-10-CM

## 2018-03-14 LAB
ALBUMIN SERPL-MCNC: 3.7 G/DL (ref 3.5–4.8)
ALP LIVER SERPL-CCNC: 89 U/L (ref 45–117)
ALT SERPL-CCNC: 46 U/L (ref 17–63)
AST SERPL-CCNC: 27 U/L (ref 15–41)
BASOPHILS # BLD AUTO: 0.07 X10(3) UL (ref 0–0.1)
BASOPHILS NFR BLD AUTO: 1.1 %
BILIRUB SERPL-MCNC: 0.2 MG/DL (ref 0.1–2)
BILIRUB UR QL STRIP.AUTO: NEGATIVE
BUN BLD-MCNC: 11 MG/DL (ref 8–20)
CALCIUM BLD-MCNC: 9.2 MG/DL (ref 8.3–10.3)
CHLORIDE: 105 MMOL/L (ref 101–111)
CLARITY UR REFRACT.AUTO: CLEAR
CO2: 25 MMOL/L (ref 22–32)
COLOR UR AUTO: YELLOW
CREAT BLD-MCNC: 0.89 MG/DL (ref 0.7–1.3)
EOSINOPHIL # BLD AUTO: 0.16 X10(3) UL (ref 0–0.3)
EOSINOPHIL NFR BLD AUTO: 2.6 %
ERYTHROCYTE [DISTWIDTH] IN BLOOD BY AUTOMATED COUNT: 13.2 % (ref 11.5–16)
FREE T4: 0.8 NG/DL (ref 0.9–1.8)
GLUCOSE BLD-MCNC: 78 MG/DL (ref 70–99)
GLUCOSE UR STRIP.AUTO-MCNC: NEGATIVE MG/DL
HCT VFR BLD AUTO: 47.9 % (ref 37–53)
HGB BLD-MCNC: 15.9 G/DL (ref 13–17)
HSCRP: 20 MG/L (ref ?–3)
IMMATURE GRANULOCYTE COUNT: 0.02 X10(3) UL (ref 0–1)
IMMATURE GRANULOCYTE RATIO %: 0.3 %
IMMUNOGLOBULIN A: 175 MG/DL (ref 70–312)
KETONES UR STRIP.AUTO-MCNC: NEGATIVE MG/DL
LEUKOCYTE ESTERASE UR QL STRIP.AUTO: NEGATIVE
LYMPHOCYTES # BLD AUTO: 2.31 X10(3) UL (ref 0.9–4)
LYMPHOCYTES NFR BLD AUTO: 37.4 %
M PROTEIN MFR SERPL ELPH: 7.7 G/DL (ref 6.1–8.3)
MCH RBC QN AUTO: 30.9 PG (ref 27–33.2)
MCHC RBC AUTO-ENTMCNC: 33.2 G/DL (ref 31–37)
MCV RBC AUTO: 93.2 FL (ref 80–99)
MONOCYTES # BLD AUTO: 0.37 X10(3) UL (ref 0.1–1)
MONOCYTES NFR BLD AUTO: 6 %
NEUTROPHIL ABS PRELIM: 3.24 X10 (3) UL (ref 1.3–6.7)
NEUTROPHILS # BLD AUTO: 3.24 X10(3) UL (ref 1.3–6.7)
NEUTROPHILS NFR BLD AUTO: 52.6 %
NITRITE UR QL STRIP.AUTO: NEGATIVE
PH UR STRIP.AUTO: 5 [PH] (ref 4.5–8)
PLATELET # BLD AUTO: 310 10(3)UL (ref 150–450)
POTASSIUM SERPL-SCNC: 3.8 MMOL/L (ref 3.6–5.1)
PROT UR STRIP.AUTO-MCNC: NEGATIVE MG/DL
RBC # BLD AUTO: 5.14 X10(6)UL (ref 4.3–5.7)
RBC UR QL AUTO: NEGATIVE
RED CELL DISTRIBUTION WIDTH-SD: 44.9 FL (ref 35.1–46.3)
SED RATE-ML: 22 MM/HR (ref 0–12)
SODIUM SERPL-SCNC: 139 MMOL/L (ref 136–144)
SP GR UR STRIP.AUTO: 1.03 (ref 1–1.03)
TSI SER-ACNC: 0.92 MIU/ML (ref 0.35–5.5)
UROBILINOGEN UR STRIP.AUTO-MCNC: <2 MG/DL
WBC # BLD AUTO: 6.2 X10(3) UL (ref 4–13)

## 2018-03-14 PROCEDURE — 84439 ASSAY OF FREE THYROXINE: CPT | Performed by: PHYSICIAN ASSISTANT

## 2018-03-14 PROCEDURE — 83516 IMMUNOASSAY NONANTIBODY: CPT | Performed by: PHYSICIAN ASSISTANT

## 2018-03-14 PROCEDURE — 81003 URINALYSIS AUTO W/O SCOPE: CPT | Performed by: PHYSICIAN ASSISTANT

## 2018-03-14 PROCEDURE — 80050 GENERAL HEALTH PANEL: CPT | Performed by: PHYSICIAN ASSISTANT

## 2018-03-14 PROCEDURE — 36415 COLL VENOUS BLD VENIPUNCTURE: CPT | Performed by: PHYSICIAN ASSISTANT

## 2018-03-14 PROCEDURE — 86141 C-REACTIVE PROTEIN HS: CPT | Performed by: PHYSICIAN ASSISTANT

## 2018-03-14 PROCEDURE — 82784 ASSAY IGA/IGD/IGG/IGM EACH: CPT | Performed by: PHYSICIAN ASSISTANT

## 2018-03-14 PROCEDURE — 87389 HIV-1 AG W/HIV-1&-2 AB AG IA: CPT | Performed by: PHYSICIAN ASSISTANT

## 2018-03-14 PROCEDURE — 85652 RBC SED RATE AUTOMATED: CPT | Performed by: PHYSICIAN ASSISTANT

## 2018-03-14 PROCEDURE — 99214 OFFICE O/P EST MOD 30 MIN: CPT | Performed by: PHYSICIAN ASSISTANT

## 2018-03-14 PROCEDURE — 86256 FLUORESCENT ANTIBODY TITER: CPT | Performed by: PHYSICIAN ASSISTANT

## 2018-03-14 PROCEDURE — 71046 X-RAY EXAM CHEST 2 VIEWS: CPT | Performed by: PHYSICIAN ASSISTANT

## 2018-03-14 RX ORDER — AZITHROMYCIN 250 MG/1
TABLET, FILM COATED ORAL
Qty: 6 TABLET | Refills: 0 | Status: SHIPPED | OUTPATIENT
Start: 2018-03-14 | End: 2018-04-11

## 2018-03-14 NOTE — PATIENT INSTRUCTIONS
Thank you for choosing Beena Goff PA-C at [place] [address]  To Do: Anu Tellez  1. Pt to get lab work and chest xray done  2. Begin nasal spray and OTC Allegra  3.  Follow-up in 1 month, sooner if problems    • Please signup for MY CHART, which insurance company approved your testing, please call Central Scheduling at [phone]  Please allow our office 5 business days to contact you regarding any testing results.     Refill policies:   Allow 3 business days for refills; controlled substances ma

## 2018-03-14 NOTE — PROGRESS NOTES
[address] [facility] Internal Medicine Progress Note    CC:  Patient presents with:  Cough: x 1 week  Weight Loss: has been losing weight   Loss Of Appetite      HPI:   HPI  Weight loss  Pt has lost 24lbs since 12/11/17  This has been an unintentional weig Neurological: Negative for dizziness, light-headedness and headaches. /80   Pulse 60   Temp 97.8 °F (36.6 °C) (Oral)   Resp 16   Ht 73\"   Wt 204 lb   BMI 26.91 kg/m²  Body mass index is 26.91 kg/m².   Physical Exam   Constitutional: He is o Anastasia (Automated) [E]      C-RP/High Sensitivity [E]      HIV AG AB Combo [E]    Meds & Refills for this Visit:  Signed Prescriptions Disp Refills    azithromycin (ZITHROMAX Z-DORIAN) 250 MG Oral Tab 6 tablet 0      Sig: Take two tablets by mouth today,

## 2018-03-16 DIAGNOSIS — R63.0 LOSS OF APPETITE: ICD-10-CM

## 2018-03-16 DIAGNOSIS — R63.4 WEIGHT LOSS: Primary | ICD-10-CM

## 2018-03-16 DIAGNOSIS — R68.81 EARLY SATIETY: ICD-10-CM

## 2018-03-16 NOTE — PROGRESS NOTES
Celiac testing is not back yet  But some of his inflammatory markers are elevated  I would like to get a STAT CT abdomen and pelvis with contrast

## 2018-03-18 ENCOUNTER — HOSPITAL ENCOUNTER (OUTPATIENT)
Dept: CT IMAGING | Age: 43
Discharge: HOME | End: 2018-03-18
Attending: PHYSICIAN ASSISTANT
Payer: COMMERCIAL

## 2018-03-18 DIAGNOSIS — R68.81 EARLY SATIETY: ICD-10-CM

## 2018-03-18 DIAGNOSIS — R63.0 LOSS OF APPETITE: ICD-10-CM

## 2018-03-18 DIAGNOSIS — R63.4 WEIGHT LOSS: ICD-10-CM

## 2018-03-18 LAB
GLIAD (DEAMIDATED) AB, IGA: NEGATIVE
GLIAD (DEAMIDATED) AB, IGG: NEGATIVE
TISSUE TRANSGLUTAMINASE AB,IGA: 1.3 U/ML (ref ?–15)
TISSUE TRANSGLUTAMINASE IGA QUALITATIVE: NEGATIVE

## 2018-03-18 PROCEDURE — 74177 CT ABD & PELVIS W/CONTRAST: CPT | Performed by: PHYSICIAN ASSISTANT

## 2018-03-19 ENCOUNTER — TELEPHONE (OUTPATIENT)
Dept: FAMILY MEDICINE CLINIC | Facility: CLINIC | Age: 43
End: 2018-03-19

## 2018-03-19 RX ORDER — OMEPRAZOLE 20 MG/1
20 CAPSULE, DELAYED RELEASE ORAL
Qty: 90 CAPSULE | Refills: 0 | Status: SHIPPED | OUTPATIENT
Start: 2018-03-19 | End: 2018-09-10

## 2018-03-19 NOTE — PROGRESS NOTES
Nothing acute  Some gastritis, lets try starting omeprazole 20mg daily  Also, pt should follow-up with GI, I gave referral for Dr. Alisa Bell  F/U as directed

## 2018-03-19 NOTE — TELEPHONE ENCOUNTER
Patient informed    Notes recorded by Danuta Perez PA-C on 3/18/2018 at 9:38 PM CDT  Nothing acute  Some gastritis, lets try starting omeprazole 20mg daily  Also, pt should follow-up with GI, I gave referral for Dr. Paul Dsouza  F/U as directed

## 2018-04-11 ENCOUNTER — OFFICE VISIT (OUTPATIENT)
Dept: FAMILY MEDICINE CLINIC | Facility: CLINIC | Age: 43
End: 2018-04-11

## 2018-04-11 VITALS
TEMPERATURE: 98 F | HEART RATE: 70 BPM | RESPIRATION RATE: 16 BRPM | DIASTOLIC BLOOD PRESSURE: 80 MMHG | WEIGHT: 210 LBS | HEIGHT: 73 IN | SYSTOLIC BLOOD PRESSURE: 120 MMHG | BODY MASS INDEX: 27.83 KG/M2

## 2018-04-11 DIAGNOSIS — R63.4 WEIGHT LOSS: Primary | ICD-10-CM

## 2018-04-11 PROCEDURE — 99213 OFFICE O/P EST LOW 20 MIN: CPT | Performed by: FAMILY MEDICINE

## 2018-04-11 NOTE — PROGRESS NOTES
HPI:    Patient ID: Miguel Kunz is a 37year old male. HPI  Mr. Sulema Aguilar is a pleasant 49-year-old male here today to follow-up for weight loss of about 10 pounds previously.   He had laboratory tests done which were unremarkable except for mildly elevate distension and no mass. There is no tenderness. Musculoskeletal: He exhibits no edema. Lymphadenopathy:     He has no cervical adenopathy. Neurological: He is alert. Vitals reviewed.              ASSESSMENT/PLAN:   Weight loss  (primary encounter di

## 2018-04-11 NOTE — PATIENT INSTRUCTIONS
Thank you for choosing Wendy Carmona MD at [place] [address]  To Do: [place] Yousif  1. Please take meds as directed  [facility] is located in [address]. Monday, Tuesday & Friday – 8 a.m. to 4 p.m. Wednesday, Thursday – 7 a.m. to 3 p.m.   Chelsy Mccormick those potential risks and we strive to make you healthier and to improve your quality of life.     Referrals, and Radiology Information:    If your insurance requires a referral to a specialist, please allow 5 business days to process your referral request.

## 2018-04-26 ENCOUNTER — OFFICE VISIT (OUTPATIENT)
Dept: FAMILY MEDICINE CLINIC | Facility: CLINIC | Age: 43
End: 2018-04-26

## 2018-04-26 VITALS
DIASTOLIC BLOOD PRESSURE: 78 MMHG | WEIGHT: 209 LBS | HEART RATE: 62 BPM | BODY MASS INDEX: 27.7 KG/M2 | RESPIRATION RATE: 16 BRPM | SYSTOLIC BLOOD PRESSURE: 124 MMHG | HEIGHT: 73 IN

## 2018-04-26 DIAGNOSIS — F51.01 PRIMARY INSOMNIA: ICD-10-CM

## 2018-04-26 DIAGNOSIS — M54.50 ACUTE BILATERAL LOW BACK PAIN WITHOUT SCIATICA: Primary | ICD-10-CM

## 2018-04-26 PROCEDURE — 99213 OFFICE O/P EST LOW 20 MIN: CPT | Performed by: PHYSICIAN ASSISTANT

## 2018-04-26 RX ORDER — METHOCARBAMOL 750 MG/1
750 TABLET, FILM COATED ORAL 3 TIMES DAILY PRN
Qty: 45 TABLET | Refills: 0 | Status: SHIPPED | OUTPATIENT
Start: 2018-04-26 | End: 2018-12-12

## 2018-04-26 RX ORDER — CLONAZEPAM 0.5 MG/1
0.5 TABLET ORAL NIGHTLY PRN
Qty: 30 TABLET | Refills: 3 | Status: SHIPPED | OUTPATIENT
Start: 2018-04-26 | End: 2018-12-12

## 2018-04-26 RX ORDER — METHYLPREDNISOLONE 4 MG/1
TABLET ORAL
Qty: 1 KIT | Refills: 0 | Status: SHIPPED | OUTPATIENT
Start: 2018-04-26 | End: 2018-12-12

## 2018-04-26 NOTE — PATIENT INSTRUCTIONS
Thank you for choosing Judith Fox PA-C at [place] [address]  To Do: Teresa Dus  1. Pt to begin medications as prescribed  2. Heat to area  3. Continue stretches  4. Referral for physical therapy if needed  5.  Follow-up in 1 month    • Please s informing you that the insurance company approved your testing, please call Central Scheduling at [phone]  Please allow our office 5 business days to contact you regarding any testing results.     Refill policies:   Allow 3 business days for refills; c

## 2018-04-26 NOTE — PROGRESS NOTES
[address] [facility] Internal Medicine Progress Note    CC:  Patient presents with:  Low Back Pain: lower left side x 1 week  Medication Request: clonazepam      HPI:   HPI  Low Back pain x 1 week  Pt states he was sore after playing hockey a couple days i Constitutional: He is oriented to person, place, and time and well-developed, well-nourished, and in no distress. HENT:   Mouth/Throat: Oropharynx is clear and moist. No oropharyngeal exudate.    Eyes: EOM are normal. Pupils are equal, round, and reactive Imaging & Consults:  OP REFERRAL TO [facility]     Patient/Caregiver Education: Patient/Caregiver Education: There are no barriers to learning. Medical education done. Outcome: Patient verbalizes understanding.     Problem List:  HERNDD

## 2018-09-12 RX ORDER — OMEPRAZOLE 20 MG/1
20 CAPSULE, DELAYED RELEASE ORAL
Qty: 90 CAPSULE | Refills: 0 | Status: SHIPPED | OUTPATIENT
Start: 2018-09-12 | End: 2019-01-23

## 2018-09-12 NOTE — TELEPHONE ENCOUNTER
Requesting Omeprazole 20mg daily  LOV: 4/11/18  RTC: prn  Last Relevant Labs: n/a  Filled: 3/19/18 #90 with 0 refills    No future appointments. Nonprotocol med pending if okay to refill.

## 2018-12-11 ENCOUNTER — TELEPHONE (OUTPATIENT)
Dept: FAMILY MEDICINE CLINIC | Facility: CLINIC | Age: 43
End: 2018-12-11

## 2018-12-11 NOTE — TELEPHONE ENCOUNTER
Patient states its $250 to go to the ER and he refuses to go to the ER, he states he was playing hockey on Sunday night and another player ran into him and he fell.  PT states that he has had a concussion in the past and would like for Jc Nevarez to order testi

## 2018-12-11 NOTE — TELEPHONE ENCOUNTER
Patient states that he thinks he may have another concussion from playing hockey on Sunday night. He has been achy, nauseous, and irritable. He has had multiple concussions before and doesn't know if he needs to have any type of radiology.    Future Appoint

## 2018-12-12 ENCOUNTER — OFFICE VISIT (OUTPATIENT)
Dept: FAMILY MEDICINE CLINIC | Facility: CLINIC | Age: 43
End: 2018-12-12
Payer: COMMERCIAL

## 2018-12-12 VITALS
HEIGHT: 73 IN | DIASTOLIC BLOOD PRESSURE: 80 MMHG | BODY MASS INDEX: 26.9 KG/M2 | SYSTOLIC BLOOD PRESSURE: 130 MMHG | TEMPERATURE: 98 F | RESPIRATION RATE: 16 BRPM | HEART RATE: 68 BPM | WEIGHT: 203 LBS

## 2018-12-12 DIAGNOSIS — G44.319 ACUTE POST-TRAUMATIC HEADACHE, NOT INTRACTABLE: ICD-10-CM

## 2018-12-12 DIAGNOSIS — R09.82 PND (POST-NASAL DRIP): ICD-10-CM

## 2018-12-12 DIAGNOSIS — Z28.21 INFLUENZA VACCINATION DECLINED: ICD-10-CM

## 2018-12-12 DIAGNOSIS — R45.4 IRRITABLE: ICD-10-CM

## 2018-12-12 DIAGNOSIS — S09.90XA INJURY OF HEAD, INITIAL ENCOUNTER: Primary | ICD-10-CM

## 2018-12-12 DIAGNOSIS — F51.01 PRIMARY INSOMNIA: ICD-10-CM

## 2018-12-12 PROCEDURE — 99214 OFFICE O/P EST MOD 30 MIN: CPT | Performed by: PHYSICIAN ASSISTANT

## 2018-12-12 RX ORDER — CLONAZEPAM 0.5 MG/1
0.5 TABLET ORAL NIGHTLY PRN
Qty: 30 TABLET | Refills: 3 | Status: SHIPPED | OUTPATIENT
Start: 2018-12-12 | End: 2019-07-09

## 2018-12-12 NOTE — PROGRESS NOTES
[facility] Internal Medicine Progress Note    CC:  Patient presents with:  Headache: collided during a hockey game on 12/9/18 - pt states he has been irritable and having intermittent dizziness since  Imm/Inj: declined flu shot      HPI:   HPI  H °C) (Oral)   Resp 16   Ht 73\"   Wt 203 lb   BMI 26.78 kg/m²  Body mass index is 26.78 kg/m². Physical Exam   Constitutional: He is oriented to person, place, and time and well-developed, well-nourished, and in no distress.    HENT:   Mouth/Throat: Orophar Quad PF [id number]      Meds & Refills for this Visit:  Requested Prescriptions     Signed Prescriptions Disp Refills   • ClonazePAM 0.5 MG Oral Tab 30 tablet 3     Sig: Take 1 tablet (0.5 mg total) by mouth nightly as needed for Anxiety (or sleep).        Layman Zeny

## 2018-12-12 NOTE — PATIENT INSTRUCTIONS
Thank you for choosing Judith Fox PA-C at [place] [address]  To Do: Carnella Dus  1. Pt to get MRI done  2. Continue medications  3. OTC flonase  4. OTC Allegra or Zyrtec  5.  Follow-up 1 month pending results    • Please signup for MY CHART, amaury insurance company approved your testing, please call Central Scheduling at [phone]  Please allow our office 5 business days to contact you regarding any testing results.     Refill policies:   Allow 3 business days for refills; controlled substances ma

## 2018-12-17 ENCOUNTER — TELEPHONE (OUTPATIENT)
Dept: FAMILY MEDICINE CLINIC | Facility: CLINIC | Age: 43
End: 2018-12-17

## 2018-12-17 RX ORDER — AZITHROMYCIN 250 MG/1
TABLET, FILM COATED ORAL
Qty: 6 TABLET | Refills: 0 | Status: SHIPPED | OUTPATIENT
Start: 2018-12-17 | End: 2019-01-09 | Stop reason: ALTCHOICE

## 2018-12-17 NOTE — TELEPHONE ENCOUNTER
Patient states that he was seen last week for a concussion and also mentioned a sinus infection. Patient states that he is not feeling any better-very congested, and would like to know if medication can be called into local [facility] pharmacy.

## 2018-12-19 ENCOUNTER — HOSPITAL ENCOUNTER (OUTPATIENT)
Dept: MRI IMAGING | Age: 43
Discharge: HOME | End: 2018-12-19
Attending: PHYSICIAN ASSISTANT
Payer: COMMERCIAL

## 2018-12-19 DIAGNOSIS — S09.90XA INJURY OF HEAD, INITIAL ENCOUNTER: ICD-10-CM

## 2018-12-19 DIAGNOSIS — G44.319 ACUTE POST-TRAUMATIC HEADACHE, NOT INTRACTABLE: ICD-10-CM

## 2018-12-19 DIAGNOSIS — R45.4 IRRITABLE: ICD-10-CM

## 2018-12-20 ENCOUNTER — TELEPHONE (OUTPATIENT)
Dept: FAMILY MEDICINE CLINIC | Facility: CLINIC | Age: 43
End: 2018-12-20

## 2018-12-20 NOTE — TELEPHONE ENCOUNTER
Spoke to patient who reports having a hx of not tolerating closed MRI's pt would like to be scheduled with an open MRI.  Advised pt that [facility] has an open MRI machine and they can perform it there, he will need to inform central scheduling to have Benjamin Zamora

## 2018-12-20 NOTE — TELEPHONE ENCOUNTER
Patient states he went for an MRI and was unable to perform it. He would like to inquire about open MRI. Please advise.

## 2018-12-31 ENCOUNTER — HOSPITAL ENCOUNTER (OUTPATIENT)
Dept: MRI IMAGING | Age: 43
Discharge: HOME | End: 2018-12-31
Attending: PHYSICIAN ASSISTANT
Payer: COMMERCIAL

## 2018-12-31 PROCEDURE — A9575 INJ GADOTERATE MEGLUMI 0.1ML: HCPCS | Performed by: PHYSICIAN ASSISTANT

## 2018-12-31 PROCEDURE — 70553 MRI BRAIN STEM W/O & W/DYE: CPT | Performed by: PHYSICIAN ASSISTANT

## 2019-01-02 NOTE — PROGRESS NOTES
Nonspecific changes in R and L frontal lobes, pt should follow-up with neuro, Dr. Valentin Mathis  Also, there is an opacification on the R frontal sinus, let's get a CT scan sinuses to further eval

## 2019-01-04 ENCOUNTER — TELEPHONE (OUTPATIENT)
Dept: FAMILY MEDICINE CLINIC | Facility: CLINIC | Age: 44
End: 2019-01-04

## 2019-01-04 DIAGNOSIS — R93.0 OPACIFICATION OF FRONTAL SINUS: Primary | ICD-10-CM

## 2019-01-09 ENCOUNTER — OFFICE VISIT (OUTPATIENT)
Dept: FAMILY MEDICINE CLINIC | Facility: CLINIC | Age: 44
End: 2019-01-09
Payer: COMMERCIAL

## 2019-01-09 VITALS
BODY MASS INDEX: 26.51 KG/M2 | HEART RATE: 66 BPM | DIASTOLIC BLOOD PRESSURE: 78 MMHG | HEIGHT: 73 IN | WEIGHT: 200 LBS | RESPIRATION RATE: 16 BRPM | SYSTOLIC BLOOD PRESSURE: 120 MMHG

## 2019-01-09 DIAGNOSIS — G47.00 INSOMNIA, UNSPECIFIED TYPE: ICD-10-CM

## 2019-01-09 DIAGNOSIS — R93.89 ABNORMAL MRI: Primary | ICD-10-CM

## 2019-01-09 DIAGNOSIS — J32.9 CHRONIC SINUSITIS, UNSPECIFIED LOCATION: ICD-10-CM

## 2019-01-09 PROCEDURE — 99214 OFFICE O/P EST MOD 30 MIN: CPT | Performed by: PHYSICIAN ASSISTANT

## 2019-01-09 RX ORDER — TRAZODONE HYDROCHLORIDE 50 MG/1
50 TABLET ORAL NIGHTLY
Qty: 90 TABLET | Refills: 2 | Status: SHIPPED | OUTPATIENT
Start: 2019-01-09 | End: 2019-01-13

## 2019-01-09 NOTE — PROGRESS NOTES
[address] [facility] Internal Medicine Progress Note    CC:  Patient presents with:  Test Results      HPI:   HPI  Pt is here today to go over his MRI results  Pt states overall he is doing well  He states he is not having mood swings like before  He has h 26.39 kg/m²  Body mass index is 26.39 kg/m². Physical Exam   Constitutional: He is oriented to person, place, and time and well-developed, well-nourished, and in no distress. HENT:   Mouth/Throat: Oropharynx is clear and moist. No oropharyngeal exudate. cost  Referral for ENT, Dr. Solomon Garza    Insomnia, unspecified type  Pt has been using clonazepam as needed  Will begin trial of trazodone, discussed side effects and adverse effects of medication    RTC 6 months, sooner if problems    No orders of the defin

## 2019-01-09 NOTE — PATIENT INSTRUCTIONS
Thank you for choosing Yael Schroeder PA-C at [place] [address]  To Do: Lefty Carbone  1. Pt to begin medications as prescribed  2. Referrals for ENT and neuro  3.  Follow-up in 6 months, sooner if problems    • Please signup for MY CHART, which is el insurance company approved your testing, please call Central Scheduling at [phone]  Please allow our office 5 business days to contact you regarding any testing results.     Refill policies:   Allow 3 business days for refills; controlled substances ma

## 2019-01-13 ENCOUNTER — APPOINTMENT (OUTPATIENT)
Dept: ULTRASOUND IMAGING | Age: 44
End: 2019-01-13
Attending: EMERGENCY MEDICINE
Payer: COMMERCIAL

## 2019-01-13 ENCOUNTER — HOSPITAL ENCOUNTER (OUTPATIENT)
Facility: HOSPITAL | Age: 44
Setting detail: OBSERVATION
Discharge: HOME | End: 2019-01-14
Attending: EMERGENCY MEDICINE | Admitting: HOSPITALIST
Payer: COMMERCIAL

## 2019-01-13 ENCOUNTER — APPOINTMENT (OUTPATIENT)
Dept: CT IMAGING | Age: 44
End: 2019-01-13
Attending: EMERGENCY MEDICINE
Payer: COMMERCIAL

## 2019-01-13 DIAGNOSIS — R11.2 INTRACTABLE VOMITING WITH NAUSEA, UNSPECIFIED VOMITING TYPE: ICD-10-CM

## 2019-01-13 DIAGNOSIS — R10.9 ABDOMINAL PAIN, ACUTE: Primary | ICD-10-CM

## 2019-01-13 PROBLEM — E87.6 HYPOKALEMIA: Status: ACTIVE | Noted: 2019-01-13

## 2019-01-13 PROBLEM — R73.9 HYPERGLYCEMIA: Status: ACTIVE | Noted: 2019-01-13

## 2019-01-13 LAB
ALBUMIN SERPL-MCNC: 4.5 G/DL (ref 3.1–4.5)
ALBUMIN/GLOB SERPL: 1.4 {RATIO} (ref 1–2)
ALP LIVER SERPL-CCNC: 77 U/L (ref 45–117)
ALT SERPL-CCNC: 34 U/L (ref 17–63)
ANION GAP SERPL CALC-SCNC: 10 MMOL/L (ref 0–18)
APTT PPP: 28.1 SECONDS (ref 26.1–34.6)
AST SERPL-CCNC: 27 U/L (ref 15–41)
BASOPHILS # BLD AUTO: 0.04 X10(3) UL (ref 0–0.1)
BASOPHILS NFR BLD AUTO: 0.3 %
BILIRUB SERPL-MCNC: 0.6 MG/DL (ref 0.1–2)
BILIRUB UR QL STRIP.AUTO: NEGATIVE
BUN BLD-MCNC: 15 MG/DL (ref 8–20)
BUN/CREAT SERPL: 15.2 (ref 10–20)
CALCIUM BLD-MCNC: 9.5 MG/DL (ref 8.3–10.3)
CHLORIDE SERPL-SCNC: 104 MMOL/L (ref 101–111)
CO2 SERPL-SCNC: 26 MMOL/L (ref 22–32)
COLOR UR AUTO: YELLOW
CREAT BLD-MCNC: 0.99 MG/DL (ref 0.7–1.3)
EOSINOPHIL # BLD AUTO: 0.04 X10(3) UL (ref 0–0.3)
EOSINOPHIL NFR BLD AUTO: 0.3 %
ERYTHROCYTE [DISTWIDTH] IN BLOOD BY AUTOMATED COUNT: 13 % (ref 11.5–16)
GLOBULIN PLAS-MCNC: 3.2 G/DL (ref 2.8–4.4)
GLUCOSE BLD-MCNC: 114 MG/DL (ref 70–99)
GLUCOSE UR STRIP.AUTO-MCNC: NEGATIVE MG/DL
HCT VFR BLD AUTO: 41.5 % (ref 37–53)
HGB BLD-MCNC: 14.1 G/DL (ref 13–17)
IMMATURE GRANULOCYTE COUNT: 0.04 X10(3) UL (ref 0–1)
IMMATURE GRANULOCYTE RATIO %: 0.3 %
INR BLD: 1.07 (ref 0.92–1.08)
KETONES UR STRIP.AUTO-MCNC: 40 MG/DL
LEUKOCYTE ESTERASE UR QL STRIP.AUTO: NEGATIVE
LIPASE: 137 U/L (ref 73–393)
LYMPHOCYTES # BLD AUTO: 1.56 X10(3) UL (ref 0.9–4)
LYMPHOCYTES NFR BLD AUTO: 11.7 %
M PROTEIN MFR SERPL ELPH: 7.7 G/DL (ref 6.4–8.2)
MCH RBC QN AUTO: 30.9 PG (ref 27–33.2)
MCHC RBC AUTO-ENTMCNC: 34 G/DL (ref 31–37)
MCV RBC AUTO: 90.8 FL (ref 80–99)
MONOCYTES # BLD AUTO: 0.37 X10(3) UL (ref 0.1–1)
MONOCYTES NFR BLD AUTO: 2.8 %
NEUTROPHIL ABS PRELIM: 11.25 X10 (3) UL (ref 1.3–6.7)
NEUTROPHILS # BLD AUTO: 11.25 X10(3) UL (ref 1.3–6.7)
NEUTROPHILS NFR BLD AUTO: 84.6 %
NITRITE UR QL STRIP.AUTO: NEGATIVE
OSMOLALITY SERPL CALC.SUM OF ELEC: 292 MOSM/KG (ref 275–295)
PH UR STRIP.AUTO: 8 [PH] (ref 4.5–8)
PLATELET # BLD AUTO: 311 10(3)UL (ref 150–450)
POTASSIUM SERPL-SCNC: 3.5 MMOL/L (ref 3.6–5.1)
PROT UR STRIP.AUTO-MCNC: NEGATIVE MG/DL
PSA SERPL DL<=0.01 NG/ML-MCNC: 14 SECONDS (ref 12.5–14.1)
RBC # BLD AUTO: 4.57 X10(6)UL (ref 4.3–5.7)
RED CELL DISTRIBUTION WIDTH-SD: 43 FL (ref 35.1–46.3)
SODIUM SERPL-SCNC: 140 MMOL/L (ref 136–144)
SP GR UR STRIP.AUTO: 1.02 (ref 1–1.03)
UROBILINOGEN UR STRIP.AUTO-MCNC: 0.2 MG/DL
WBC # BLD AUTO: 13.3 X10(3) UL (ref 4–13)

## 2019-01-13 PROCEDURE — 74177 CT ABD & PELVIS W/CONTRAST: CPT | Performed by: EMERGENCY MEDICINE

## 2019-01-13 PROCEDURE — 76700 US EXAM ABDOM COMPLETE: CPT | Performed by: EMERGENCY MEDICINE

## 2019-01-13 RX ORDER — SODIUM CHLORIDE 9 MG/ML
INJECTION, SOLUTION INTRAVENOUS ONCE
Status: DISCONTINUED | OUTPATIENT
Start: 2019-01-13 | End: 2019-01-14

## 2019-01-13 RX ORDER — ONDANSETRON 2 MG/ML
4 INJECTION INTRAMUSCULAR; INTRAVENOUS ONCE
Status: COMPLETED | OUTPATIENT
Start: 2019-01-13 | End: 2019-01-13

## 2019-01-13 RX ORDER — SODIUM CHLORIDE 9 MG/ML
INJECTION, SOLUTION INTRAVENOUS ONCE
Status: COMPLETED | OUTPATIENT
Start: 2019-01-13 | End: 2019-01-13

## 2019-01-13 RX ORDER — METOCLOPRAMIDE HYDROCHLORIDE 5 MG/ML
5 INJECTION INTRAMUSCULAR; INTRAVENOUS ONCE
Status: COMPLETED | OUTPATIENT
Start: 2019-01-13 | End: 2019-01-13

## 2019-01-13 RX ORDER — DIPHENHYDRAMINE HYDROCHLORIDE 50 MG/ML
25 INJECTION INTRAMUSCULAR; INTRAVENOUS ONCE
Status: COMPLETED | OUTPATIENT
Start: 2019-01-13 | End: 2019-01-13

## 2019-01-13 RX ORDER — SODIUM CHLORIDE 9 MG/ML
INJECTION, SOLUTION INTRAVENOUS ONCE
Status: DISCONTINUED | OUTPATIENT
Start: 2019-01-13 | End: 2019-01-13

## 2019-01-13 RX ORDER — LORAZEPAM 2 MG/ML
0.5 INJECTION INTRAMUSCULAR ONCE
Status: COMPLETED | OUTPATIENT
Start: 2019-01-13 | End: 2019-01-13

## 2019-01-13 RX ORDER — MORPHINE SULFATE 4 MG/ML
2 INJECTION, SOLUTION INTRAMUSCULAR; INTRAVENOUS ONCE
Status: COMPLETED | OUTPATIENT
Start: 2019-01-13 | End: 2019-01-13

## 2019-01-14 VITALS
HEART RATE: 66 BPM | RESPIRATION RATE: 14 BRPM | SYSTOLIC BLOOD PRESSURE: 147 MMHG | DIASTOLIC BLOOD PRESSURE: 87 MMHG | BODY MASS INDEX: 26.51 KG/M2 | OXYGEN SATURATION: 96 % | WEIGHT: 200 LBS | TEMPERATURE: 98 F | HEIGHT: 73 IN

## 2019-01-14 PROBLEM — R11.10 VOMITING: Status: ACTIVE | Noted: 2019-01-14

## 2019-01-14 LAB
ADENOVIRUS PCR:: NEGATIVE
B PERT DNA SPEC QL NAA+PROBE: NEGATIVE
C PNEUM DNA SPEC QL NAA+PROBE: NEGATIVE
CORONAVIRUS 229E PCR:: NEGATIVE
CORONAVIRUS HKU1 PCR:: NEGATIVE
CORONAVIRUS NL63 PCR:: NEGATIVE
CORONAVIRUS OC43 PCR:: NEGATIVE
FLUAV RNA SPEC QL NAA+PROBE: NEGATIVE
FLUBV RNA SPEC QL NAA+PROBE: NEGATIVE
METAPNEUMOVIRUS PCR:: NEGATIVE
MYCOPLASMA PNEUMONIA PCR:: NEGATIVE
PARAINFLUENZA 1 PCR:: NEGATIVE
PARAINFLUENZA 2 PCR:: NEGATIVE
PARAINFLUENZA 3 PCR:: NEGATIVE
PARAINFLUENZA 4 PCR:: NEGATIVE
POTASSIUM SERPL-SCNC: 3.6 MMOL/L (ref 3.6–5.1)
POTASSIUM SERPL-SCNC: 3.7 MMOL/L (ref 3.6–5.1)
RHINOVIRUS/ENTERO PCR:: NEGATIVE
RSV RNA SPEC QL NAA+PROBE: NEGATIVE

## 2019-01-14 PROCEDURE — 99219 INITIAL OBSERVATION CARE,LEVL II: CPT | Performed by: HOSPITALIST

## 2019-01-14 RX ORDER — CLONAZEPAM 0.5 MG/1
0.5 TABLET ORAL NIGHTLY PRN
Status: DISCONTINUED | OUTPATIENT
Start: 2019-01-14 | End: 2019-01-14

## 2019-01-14 RX ORDER — METOCLOPRAMIDE HYDROCHLORIDE 5 MG/ML
10 INJECTION INTRAMUSCULAR; INTRAVENOUS EVERY 8 HOURS PRN
Status: DISCONTINUED | OUTPATIENT
Start: 2019-01-14 | End: 2019-01-14

## 2019-01-14 RX ORDER — ACETAMINOPHEN 325 MG/1
650 TABLET ORAL EVERY 6 HOURS PRN
Status: DISCONTINUED | OUTPATIENT
Start: 2019-01-14 | End: 2019-01-14

## 2019-01-14 RX ORDER — SODIUM CHLORIDE, SODIUM LACTATE, POTASSIUM CHLORIDE, CALCIUM CHLORIDE 600; 310; 30; 20 MG/100ML; MG/100ML; MG/100ML; MG/100ML
INJECTION, SOLUTION INTRAVENOUS CONTINUOUS
Status: DISCONTINUED | OUTPATIENT
Start: 2019-01-14 | End: 2019-01-14

## 2019-01-14 RX ORDER — ONDANSETRON 2 MG/ML
4 INJECTION INTRAMUSCULAR; INTRAVENOUS EVERY 6 HOURS PRN
Status: DISCONTINUED | OUTPATIENT
Start: 2019-01-14 | End: 2019-01-14

## 2019-01-14 RX ORDER — PANTOPRAZOLE SODIUM 40 MG/1
40 TABLET, DELAYED RELEASE ORAL
Status: DISCONTINUED | OUTPATIENT
Start: 2019-01-14 | End: 2019-01-14

## 2019-01-14 RX ORDER — MULTIVITAMIN
1 TABLET ORAL DAILY
COMMUNITY

## 2019-01-14 RX ORDER — SODIUM CHLORIDE 9 MG/ML
INJECTION, SOLUTION INTRAVENOUS CONTINUOUS
Status: ACTIVE | OUTPATIENT
Start: 2019-01-14 | End: 2019-01-14

## 2019-01-14 RX ORDER — ONDANSETRON 4 MG/1
4 TABLET, ORALLY DISINTEGRATING ORAL EVERY 4 HOURS PRN
Qty: 10 TABLET | Refills: 0 | Status: SHIPPED | OUTPATIENT
Start: 2019-01-14 | End: 2019-01-22

## 2019-01-14 RX ORDER — ONDANSETRON 2 MG/ML
4 INJECTION INTRAMUSCULAR; INTRAVENOUS EVERY 4 HOURS PRN
Status: DISCONTINUED | OUTPATIENT
Start: 2019-01-14 | End: 2019-01-14

## 2019-01-14 RX ORDER — TRAZODONE HYDROCHLORIDE 100 MG/1
50 TABLET ORAL NIGHTLY PRN
Status: DISCONTINUED | OUTPATIENT
Start: 2019-01-14 | End: 2019-01-14

## 2019-01-14 NOTE — ED NOTES
Report given to [facility], [address]   EMS here, report given

## 2019-01-14 NOTE — H&P
JAQUELIN HOSPITALIST  History and Physical     lOy Owen Patient Status:  Observation    4/3/1975 MRN [id number]   [facility] 3NE-A Attending Jaime Pereyra MD   Hosp Day # 0 PCP Ama Johnson MD     Chief Complaint: n/v    History o Augmentin [Amoclan]     NAUSEA AND VOMITING    Comment:VOMIT    Medications:    No current facility-administered medications on file prior to encounter.    Current Outpatient Medications on File Prior to Encounter:  ClonazePAM 0.5 MG Oral Tab Take 1 table of 0.99 mg/dL). Recent Labs   Lab  01/13/19 1921   PTP  14.0   INR  1.07       No results for input(s): TROP, CK in the last 168 hours. Imaging: Imaging data reviewed in Epic. ASSESSMENT / PLAN:     1.  Gastroenteritis-prn antiemetics; fluid hy

## 2019-01-14 NOTE — PROGRESS NOTES
Pt is A/O x 4. Droplet precautions r/o flu. No vomiting since admission. Zofran IV PRN nausea. Pt drinking sips of water and ice chips tolerating well. IVF per [facility]. Potassium replaced IV per electrolyte protocol. Will continue to monitor.

## 2019-01-14 NOTE — PAYOR COMM NOTE
--------------  ADMISSION REVIEW     Payor: [address] PPO  Subscriber #:  [id number]  Authorization Number: N/A    Admit date: N/A  Admit time: N/A       Admitting Physician: Rolan Xavier MD  Attending Physician:  Kierra Guillen*  Pr Vitamin D deficiency 5/5/2017     Past Surgical History:   Procedure Laterality Date   • EAR MYRINGOTOMY TUBE INSERTION Bilateral 3/10/2014    Performed by Stewart Meng MD at [facility] MAIN OR   • HERNIA SURGERY Right 09/14/2017   • NASAL SEPTOPLASTY,TURBIN appreciated. Pulses are 2+ and equal in all 4 extremities. NEURO: Patient is awake, alert and oriented to time place and person. Motor strength is 5 over 5 in all 4 extremities. There are no gross motor or sensory deficits appreciated.   Patient is answeri recommended. [facility]   22:33  PT WITH PERSISTENT VOMITING AFTER MEDS HERE IN THE ED. PT WITH NO OTHER COMPLAINTS AT THIS TIME. PT WITH NO ABD PAIN, NO CHEST PAIN, NO OTHER COMPLAINTS.   WILL ADMIT FOR FURTHER CARE AT THIS TIME AS HE IS STILL HAVING SYM Hyperglycemia R73.9 1/13/2019 Yes      Ramy Blankenship MD on 1/13/2019 10:38 PM    H&P signed by Daksha Pierre MD at 1/14/2019 12:13 AM     Author:  Daksha Pierre MD Service:  [facility] Author Type:  Physician    Filed:  1/14/2019 12:13 AM Date of Service: No wheezes. No rhonchi. Cardiovascular: S1, S2. Regular rate and rhythm. No murmurs, rubs or gallops. Equal pulses. Chest and Back: No tenderness or deformity. Abdomen: Soft, nontender, nondistended. Positive bowel sounds.  No rebound, guarding or orga Date Action Dose Route User    1/13/2019 1923 Given 2 mg Intravenous Lisa Osman RN      ondansetron HCl [facility]) injection 4 mg     Date Action Dose Route User    1/13/2019 1923 Given 4 mg Intravenous Lisa Osman RN      ondansetron HCl [facility])

## 2019-01-14 NOTE — PROGRESS NOTES
NURSING ADMISSION NOTE      Patient admitted via Ambulance  Oriented to room. Safety precautions initiated. Bed in low position. Call light in reach. Pt arrived from  ER comfortable. Admission assessment completed with patient and his wife.  Gio Potter

## 2019-01-14 NOTE — PROGRESS NOTES
NURSING DISCHARGE NOTE    Discharged Home via Wheelchair. Accompanied by Family member and Support staff  Belongings Taken by patient/family. Pt discharged home. Discharge paperwork and prescription for zofran given to pt.  Pt wife at bedside at issa

## 2019-01-14 NOTE — PLAN OF CARE
DISCHARGE PLANNING    • Patient's discharge needs are met [address]    • Patient will not fall Progressing            Assumed care at 0730. Pt a/ox4, VSS, on RA, NSR on telemetry.  Pt with some nausea and headache rating 3/10, P

## 2019-01-14 NOTE — ED PROVIDER NOTES
Patient Seen in: THE [facility] Emergency Department In [place]    History   Patient presents with:  Nausea/Vomiting/Diarrhea (gastrointestinal)    Stated Complaint:     HPI    The patient is a 40-year-old male who presents emergency room with a history of ge Smokeless tobacco: Never Used    Alcohol use: Yes      Comment: RARE    Drug use: No      Review of Systems    Positive for stated complaint:   Other systems are as noted in HPI. Constitutional and vital signs reviewed.       All other systems reviewed a METABOLIC PANEL (14) - Abnormal; Notable for the following components:       Result Value    Glucose 114 (*)     Potassium 3.5 (*)     All other components within normal limits   URINALYSIS WITH CULTURE REFLEX - Abnormal; Notable for the following componen benign findings. 4.  There is a complex nodule in the left kidney. This appears unchanged. Interval stability would favor a benign etiology is well. 5.  Details as above. Continued clinical correlation recommended.     Dictated by: Camilo Ayers MD on 1/13 Impression:  Abdominal pain, acute  (primary encounter diagnosis)  Intractable vomiting with nausea, unspecified vomiting type    Disposition:  Admit  1/13/2019 10:28 pm    Follow-up:  No follow-up provider specified.       Medications Prescribed:  Current

## 2019-01-17 ENCOUNTER — TELEPHONE (OUTPATIENT)
Dept: FAMILY MEDICINE CLINIC | Facility: CLINIC | Age: 44
End: 2019-01-17

## 2019-01-17 NOTE — TELEPHONE ENCOUNTER
Per pt wife pt has been having ongoing vomiting, patient is unable to keep solids down, is only able to keep fluids, pt [facility] DR LISA LOPEZ via ambulance yesterday, pt was given raglan, Zofran, and IV fluids.  Wife is a paramedic and states she does no

## 2019-01-17 NOTE — TELEPHONE ENCOUNTER
Patient was taken to [facility] by ambulance -but patient's wife does not want him admitted. Patient's wife was agitated because she does not want him at [address] [facility].   Patient's wife was upset because the physician did not see her  when he wa

## 2019-01-17 NOTE — TELEPHONE ENCOUNTER
Patient's wife called back to check status of previous message.  After talking with clinical supervisor and nurse, explained to patient that our physicians do not do direct admit, and that the patient would need to go to the ER first.

## 2019-01-17 NOTE — TELEPHONE ENCOUNTER
Pts wife  calling in regards to pt   getting a direct admit to [facility] , pt was just discharged on Monday for projectile vomitting and is still not feeling well still vomitting, please advise

## 2019-01-18 NOTE — TELEPHONE ENCOUNTER
Spoke to patients wife and informed of below, also provided pt with recommendations for GI specialist per Stephanie's recommendations. Wife verbalized understanding. No future appointments.

## 2019-01-22 ENCOUNTER — TELEPHONE (OUTPATIENT)
Dept: FAMILY MEDICINE CLINIC | Facility: CLINIC | Age: 44
End: 2019-01-22

## 2019-01-22 ENCOUNTER — HOSPITAL ENCOUNTER (EMERGENCY)
Facility: HOSPITAL | Age: 44
Discharge: LEFT BEFORE DISCHARGE | End: 2019-01-22
Payer: COMMERCIAL

## 2019-01-22 ENCOUNTER — OFFICE VISIT (OUTPATIENT)
Dept: FAMILY MEDICINE CLINIC | Facility: CLINIC | Age: 44
End: 2019-01-22
Payer: COMMERCIAL

## 2019-01-22 VITALS
BODY MASS INDEX: 25.05 KG/M2 | HEART RATE: 80 BPM | WEIGHT: 189 LBS | SYSTOLIC BLOOD PRESSURE: 150 MMHG | DIASTOLIC BLOOD PRESSURE: 100 MMHG | HEIGHT: 73 IN | TEMPERATURE: 99 F | RESPIRATION RATE: 16 BRPM

## 2019-01-22 VITALS
OXYGEN SATURATION: 99 % | BODY MASS INDEX: 26.9 KG/M2 | WEIGHT: 203 LBS | HEIGHT: 73 IN | HEART RATE: 75 BPM | DIASTOLIC BLOOD PRESSURE: 101 MMHG | SYSTOLIC BLOOD PRESSURE: 165 MMHG | RESPIRATION RATE: 18 BRPM | TEMPERATURE: 99 F

## 2019-01-22 DIAGNOSIS — R10.9 ABDOMINAL PAIN, ACUTE: Primary | ICD-10-CM

## 2019-01-22 DIAGNOSIS — R11.14 BILIOUS VOMITING WITH NAUSEA: ICD-10-CM

## 2019-01-22 PROCEDURE — 99214 OFFICE O/P EST MOD 30 MIN: CPT | Performed by: FAMILY MEDICINE

## 2019-01-22 RX ORDER — METOCLOPRAMIDE 10 MG/1
TABLET ORAL
Qty: 30 TABLET | Refills: 0 | Status: SHIPPED | OUTPATIENT
Start: 2019-01-22 | End: 2019-02-04

## 2019-01-22 RX ORDER — METOCLOPRAMIDE 10 MG/1
TABLET ORAL
Refills: 0 | COMMUNITY
Start: 2019-01-17 | End: 2019-01-22

## 2019-01-22 RX ORDER — ONDANSETRON 4 MG/1
4 TABLET, ORALLY DISINTEGRATING ORAL EVERY 4 HOURS PRN
Qty: 10 TABLET | Refills: 1 | Status: SHIPPED | OUTPATIENT
Start: 2019-01-22 | End: 2019-02-04

## 2019-01-22 NOTE — TELEPHONE ENCOUNTER
Pt wife is calling and he is still waiting in the ER. Wants to know if Dr. Shannon De Jesus will take care of him OP versus going/staying at the ER.  He has an appt with GI next week

## 2019-01-22 NOTE — PATIENT INSTRUCTIONS
Thank you for choosing Javon Odom MD at [place] [address]  To Do: Romina Copeland  1. Go to ED  THE [facility] Reference Lab is located in [address]. Monday, Tuesday & Friday – 8 a.m. to 4 p.m. Wednesday, Thursday – 7 a.m. to 3 p.m.   The lab is closed lonny and we strive to make you healthier and to improve your quality of life.     Referrals, and Radiology Information:    If your insurance requires a referral to a specialist, please allow 5 business days to process your referral request.    If Paul Young,

## 2019-01-22 NOTE — ED INITIAL ASSESSMENT (HPI)
Patient reports he is feeling much better at this time. Patient wishes to go home, reports he will go to the [place] ER if complaints return or he feels worse. Patient ambulatory out of ER with steady gait, in NAD.

## 2019-01-22 NOTE — TELEPHONE ENCOUNTER
Wife states they are still waiting at the ER, they want to leave. I asked that they stay to be seen. She wants Dr Shea Castle to refill patients Zofran and Reglan. She states he takes the Zofran 4mg every 2-4 hours around the clock and Reglan every 4 hours.

## 2019-01-22 NOTE — PROGRESS NOTES
HPI:    Patient ID: Ricardo Matthews is a 37year old male. HPI  Mr. Tonja Rosales is a 22-year-old gentleman with history of GERD who is accompanied by his wife Graeme Saenz as he has been having nausea and bilious vomiting for the past 9 days.   He was in a restaura Oral Tablet Dispersible Take 1 tablet (4 mg total) by mouth every 4 (four) hours as needed for Nausea. Disp: 10 tablet Rfl: 0   ClonazePAM 0.5 MG Oral Tab Take 1 tablet (0.5 mg total) by mouth nightly as needed for Anxiety (or sleep).  Disp: 30 tablet Rfl:

## 2019-01-22 NOTE — ED INITIAL ASSESSMENT (HPI)
38 y/o male to ED with c/o nausea and vomiting x9 days. Patient reports he was admitted last Sunday for same complaint, patient reports he was discharged recently and [facility] for [facility].  Patient denies manishve

## 2019-01-23 RX ORDER — OMEPRAZOLE 20 MG/1
20 CAPSULE, DELAYED RELEASE ORAL
Qty: 90 CAPSULE | Refills: 1 | Status: SHIPPED | OUTPATIENT
Start: 2019-01-23 | End: 2019-07-25

## 2019-01-23 NOTE — TELEPHONE ENCOUNTER
Requesting omeprazole 20 MG   LOV: 1/23/19  RTC:   Last Relevant Labs:   Filled: 9/12/18 # 90 with 0 refills    Future Appointments   Date Time Provider Jorge Alberto Melchor   2/4/2019  9:40 AM Coral Mason MD [facility]

## 2019-01-23 NOTE — TELEPHONE ENCOUNTER
OMEPRAZOLE 20 MG Oral Capsule Delayed Release    [facility]PHARMACY #[id number]- [facility] [place] - [address] [phone], [phone]

## 2019-07-09 ENCOUNTER — LAB ENCOUNTER (OUTPATIENT)
Dept: LAB | Age: 44
End: 2019-07-09
Attending: PHYSICIAN ASSISTANT
Payer: COMMERCIAL

## 2019-07-09 ENCOUNTER — OFFICE VISIT (OUTPATIENT)
Dept: FAMILY MEDICINE CLINIC | Facility: CLINIC | Age: 44
End: 2019-07-09
Payer: COMMERCIAL

## 2019-07-09 VITALS
BODY MASS INDEX: 26.06 KG/M2 | HEART RATE: 76 BPM | HEIGHT: 73 IN | SYSTOLIC BLOOD PRESSURE: 116 MMHG | WEIGHT: 196.63 LBS | TEMPERATURE: 98 F | DIASTOLIC BLOOD PRESSURE: 70 MMHG

## 2019-07-09 DIAGNOSIS — M77.11 LATERAL EPICONDYLITIS OF RIGHT ELBOW: ICD-10-CM

## 2019-07-09 DIAGNOSIS — E78.5 DYSLIPIDEMIA: ICD-10-CM

## 2019-07-09 DIAGNOSIS — Z00.00 LABORATORY EXAM ORDERED AS PART OF ROUTINE GENERAL MEDICAL EXAMINATION: ICD-10-CM

## 2019-07-09 DIAGNOSIS — Z00.00 WELLNESS EXAMINATION: Primary | ICD-10-CM

## 2019-07-09 DIAGNOSIS — Z12.5 SCREENING FOR PROSTATE CANCER: ICD-10-CM

## 2019-07-09 DIAGNOSIS — J01.00 ACUTE MAXILLARY SINUSITIS, RECURRENCE NOT SPECIFIED: ICD-10-CM

## 2019-07-09 DIAGNOSIS — H65.93 BILATERAL SEROUS OTITIS MEDIA, UNSPECIFIED CHRONICITY: ICD-10-CM

## 2019-07-09 DIAGNOSIS — F51.01 PRIMARY INSOMNIA: ICD-10-CM

## 2019-07-09 LAB
ALBUMIN SERPL-MCNC: 3.8 G/DL (ref 3.4–5)
ALBUMIN/GLOB SERPL: 1.3 {RATIO} (ref 1–2)
ALP LIVER SERPL-CCNC: 62 U/L (ref 45–117)
ALT SERPL-CCNC: 55 U/L (ref 16–61)
ANION GAP SERPL CALC-SCNC: 7 MMOL/L (ref 0–18)
AST SERPL-CCNC: 38 U/L (ref 15–37)
BASOPHILS # BLD AUTO: 0.04 X10(3) UL (ref 0–0.2)
BASOPHILS NFR BLD AUTO: 0.5 %
BILIRUB SERPL-MCNC: 0.5 MG/DL (ref 0.1–2)
BUN BLD-MCNC: 15 MG/DL (ref 7–18)
BUN/CREAT SERPL: 14.9 (ref 10–20)
CALCIUM BLD-MCNC: 8.9 MG/DL (ref 8.5–10.1)
CHLORIDE SERPL-SCNC: 109 MMOL/L (ref 98–112)
CHOLEST SMN-MCNC: 202 MG/DL (ref ?–200)
CO2 SERPL-SCNC: 25 MMOL/L (ref 21–32)
COMPLEXED PSA SERPL-MCNC: 0.62 NG/ML (ref ?–4)
CREAT BLD-MCNC: 1.01 MG/DL (ref 0.7–1.3)
DEPRECATED RDW RBC AUTO: 44.4 FL (ref 35.1–46.3)
EOSINOPHIL # BLD AUTO: 0.25 X10(3) UL (ref 0–0.7)
EOSINOPHIL NFR BLD AUTO: 3.2 %
ERYTHROCYTE [DISTWIDTH] IN BLOOD BY AUTOMATED COUNT: 13.2 % (ref 11–15)
GLOBULIN PLAS-MCNC: 3 G/DL (ref 2.8–4.4)
GLUCOSE BLD-MCNC: 92 MG/DL (ref 70–99)
HCT VFR BLD AUTO: 41.1 % (ref 39–53)
HDLC SERPL-MCNC: 41 MG/DL (ref 40–59)
HGB BLD-MCNC: 14 G/DL (ref 13–17.5)
IMM GRANULOCYTES # BLD AUTO: 0.04 X10(3) UL (ref 0–1)
IMM GRANULOCYTES NFR BLD: 0.5 %
LDLC SERPL CALC-MCNC: 136 MG/DL (ref ?–100)
LYMPHOCYTES # BLD AUTO: 2.27 X10(3) UL (ref 1–4)
LYMPHOCYTES NFR BLD AUTO: 29.1 %
M PROTEIN MFR SERPL ELPH: 6.8 G/DL (ref 6.4–8.2)
MCH RBC QN AUTO: 31.5 PG (ref 26–34)
MCHC RBC AUTO-ENTMCNC: 34.1 G/DL (ref 31–37)
MCV RBC AUTO: 92.4 FL (ref 80–100)
MONOCYTES # BLD AUTO: 0.47 X10(3) UL (ref 0.1–1)
MONOCYTES NFR BLD AUTO: 6 %
NEUTROPHILS # BLD AUTO: 4.72 X10 (3) UL (ref 1.5–7.7)
NEUTROPHILS # BLD AUTO: 4.72 X10(3) UL (ref 1.5–7.7)
NEUTROPHILS NFR BLD AUTO: 60.7 %
NONHDLC SERPL-MCNC: 161 MG/DL (ref ?–130)
OSMOLALITY SERPL CALC.SUM OF ELEC: 292 MOSM/KG (ref 275–295)
PLATELET # BLD AUTO: 270 10(3)UL (ref 150–450)
POTASSIUM SERPL-SCNC: 4 MMOL/L (ref 3.5–5.1)
RBC # BLD AUTO: 4.45 X10(6)UL (ref 4.3–5.7)
SODIUM SERPL-SCNC: 141 MMOL/L (ref 136–145)
T4 FREE SERPL-MCNC: 0.8 NG/DL (ref 0.8–1.7)
TRIGL SERPL-MCNC: 123 MG/DL (ref 30–149)
TSI SER-ACNC: 1.35 MIU/ML (ref 0.36–3.74)
VLDLC SERPL CALC-MCNC: 25 MG/DL (ref 0–30)
WBC # BLD AUTO: 7.8 X10(3) UL (ref 4–11)

## 2019-07-09 PROCEDURE — 99213 OFFICE O/P EST LOW 20 MIN: CPT | Performed by: PHYSICIAN ASSISTANT

## 2019-07-09 PROCEDURE — 84439 ASSAY OF FREE THYROXINE: CPT | Performed by: PHYSICIAN ASSISTANT

## 2019-07-09 PROCEDURE — 80050 GENERAL HEALTH PANEL: CPT | Performed by: PHYSICIAN ASSISTANT

## 2019-07-09 PROCEDURE — 80061 LIPID PANEL: CPT | Performed by: PHYSICIAN ASSISTANT

## 2019-07-09 PROCEDURE — 36415 COLL VENOUS BLD VENIPUNCTURE: CPT | Performed by: PHYSICIAN ASSISTANT

## 2019-07-09 PROCEDURE — 84153 ASSAY OF PSA TOTAL: CPT | Performed by: PHYSICIAN ASSISTANT

## 2019-07-09 PROCEDURE — 99396 PREV VISIT EST AGE 40-64: CPT | Performed by: PHYSICIAN ASSISTANT

## 2019-07-09 RX ORDER — METHYLPREDNISOLONE 4 MG/1
TABLET ORAL
Qty: 1 KIT | Refills: 0 | Status: SHIPPED | OUTPATIENT
Start: 2019-07-09 | End: 2019-09-23 | Stop reason: ALTCHOICE

## 2019-07-09 RX ORDER — CLONAZEPAM 0.5 MG/1
0.5 TABLET ORAL NIGHTLY PRN
Qty: 30 TABLET | Refills: 3 | Status: SHIPPED | OUTPATIENT
Start: 2019-07-09 | End: 2020-06-25

## 2019-07-09 RX ORDER — TRAZODONE HYDROCHLORIDE 50 MG/1
TABLET ORAL
Qty: 30 TABLET | Refills: 2 | Status: SHIPPED | OUTPATIENT
Start: 2019-07-09 | End: 2020-12-09

## 2019-07-09 RX ORDER — TRAZODONE HYDROCHLORIDE 50 MG/1
TABLET ORAL
Refills: 2 | COMMUNITY
Start: 2019-04-05 | End: 2019-07-09

## 2019-07-09 RX ORDER — AZITHROMYCIN 250 MG/1
TABLET, FILM COATED ORAL
Qty: 6 TABLET | Refills: 0 | Status: SHIPPED | OUTPATIENT
Start: 2019-07-09 | End: 2019-09-23 | Stop reason: ALTCHOICE

## 2019-07-09 NOTE — PROGRESS NOTES
REASON FOR VISIT:    Teresa Collazo is a 53year old male who presents for an [address]. R elbow pain for the past few weeks, feels burning down through his forearm. Lifting increases pain.   Doing day to day activities doesn't seem to bot the LAST 2 WEEKS   Little interest or pleasure in doing things (over the last two weeks)?: Not at all  Feeling down, depressed, or hopeless (over the last two weeks)?: Not at all  PHQ-2 SCORE: 0         PREVENTATIVE SERVICES  INDICATIONS AND SCHEDULE Recom Comment:VOMIT  CURRENT MEDICATIONS:     Current Outpatient Medications:  clonazePAM 0.5 MG Oral Tab Take 1 tablet (0.5 mg total) by mouth nightly as needed for Anxiety (or sleep).  Disp: 30 tablet Rfl: 3   traZODone HCl 50 MG Oral Tab TAKE 1 TABLET BY MOUTH Years since quittin.9      Smokeless tobacco: Never Used    Alcohol use: Yes      Comment: RARE    Drug use: No    Occ:       REVIEW OF SYSTEMS:   GENERAL: feels well otherwise  SKIN: denies any unusual skin lesions  EYES: denies blurred vision examination  -     CBC WITH DIFFERENTIAL WITH PLATELET; Future  -     COMP METABOLIC PANEL (14); Future  -     LIPID PANEL;  Future  -     TSH+FREE T4; Future    Screening for prostate cancer  -     PSA SCREEN; Future    Lateral epicondylitis of right elbow 10 years   HPV Males 11-21   Zoster (Shingles) 60 and older: one dose   Varicella 2 doses if not immune   MMR 1-2 doses if born after 1958 and not immune

## 2019-07-09 NOTE — PATIENT INSTRUCTIONS
Thank you for choosing Audra Mccann PA-C at [address]  To Do: Renetta Srinivasan  1. Patient to begin medications as prescribed  2. Over-the-counter Flonase  3. Patient to get lab work done  4. Purchase a tennis elbow strap  5.   Follow-up if insurance company. Once our office has called informing you that the insurance company approved your testing, please call Central Scheduling at [phone]  Please allow our office 5 business days to contact you regarding any testing results.     Refil

## 2019-07-15 NOTE — PROGRESS NOTES
+ elevated cholesterol, work on low carb diet, increase physical activity  All other labs are stable

## 2019-07-25 RX ORDER — OMEPRAZOLE 20 MG/1
CAPSULE, DELAYED RELEASE ORAL
Qty: 90 CAPSULE | Refills: 1 | Status: SHIPPED | OUTPATIENT
Start: 2019-07-25 | End: 2020-06-10

## 2019-07-25 NOTE — TELEPHONE ENCOUNTER
Requesting Omeprazole 20mg  LOV: 7/9/19 for Physical  RTC: 1 year  Last Relevant Labs: 7/9/19  Filled: 1/23/19 #90 with 1 refills    Future Appointments   Date Time Provider Jorge Alberto Melchor   8/22/2019 11:30 AM Bud Farfan MD [facility]     Non-

## 2019-09-23 ENCOUNTER — OFFICE VISIT (OUTPATIENT)
Dept: FAMILY MEDICINE CLINIC | Facility: CLINIC | Age: 44
End: 2019-09-23
Payer: COMMERCIAL

## 2019-09-23 ENCOUNTER — HOSPITAL ENCOUNTER (OUTPATIENT)
Dept: GENERAL RADIOLOGY | Age: 44
Discharge: HOME | End: 2019-09-23
Attending: FAMILY MEDICINE
Payer: COMMERCIAL

## 2019-09-23 VITALS
SYSTOLIC BLOOD PRESSURE: 100 MMHG | WEIGHT: 195 LBS | RESPIRATION RATE: 16 BRPM | DIASTOLIC BLOOD PRESSURE: 80 MMHG | HEART RATE: 72 BPM | BODY MASS INDEX: 25.84 KG/M2 | TEMPERATURE: 99 F | HEIGHT: 73 IN

## 2019-09-23 DIAGNOSIS — M25.522 LEFT ELBOW PAIN: Primary | ICD-10-CM

## 2019-09-23 DIAGNOSIS — H65.01 NON-RECURRENT ACUTE SEROUS OTITIS MEDIA OF RIGHT EAR: ICD-10-CM

## 2019-09-23 DIAGNOSIS — L84 CORN OF FOOT: ICD-10-CM

## 2019-09-23 DIAGNOSIS — M25.522 LEFT ELBOW PAIN: ICD-10-CM

## 2019-09-23 PROBLEM — E87.6 HYPOKALEMIA: Status: RESOLVED | Noted: 2019-01-13 | Resolved: 2019-09-23

## 2019-09-23 PROCEDURE — 99214 OFFICE O/P EST MOD 30 MIN: CPT | Performed by: FAMILY MEDICINE

## 2019-09-23 PROCEDURE — 73080 X-RAY EXAM OF ELBOW: CPT | Performed by: FAMILY MEDICINE

## 2019-09-23 RX ORDER — AZITHROMYCIN 250 MG/1
TABLET, FILM COATED ORAL
Qty: 6 TABLET | Refills: 0 | Status: SHIPPED | OUTPATIENT
Start: 2019-09-23 | End: 2019-11-20 | Stop reason: ALTCHOICE

## 2019-09-23 NOTE — PATIENT INSTRUCTIONS
Thank you for choosing Javon Odom MD at [place] [address]  To Do: Romina Copeland  1. Please take meds as directed. [facility] is located in [address]. Monday, Tuesday & Friday – 8 a.m. to 4 p.m. Wednesday, Thursday – 7 a.m. to 3 p.m. those potential risks and we strive to make you healthier and to improve your quality of life.     Referrals, and Radiology Information:    If your insurance requires a referral to a specialist, please allow 5 business days to process your referral request. mildew, and pet hair in the house. Also stop or greatly limit your child’s contact with secondhand smoke. · If food allergies are a problem, identify the food that triggers the reaction. Help your child avoid it.   Watching and waiting  Sometimes it is bet

## 2019-09-23 NOTE — PROGRESS NOTES
HPI:    Patient ID: Yared Denny is a 53year old male. HPI   Mr. Molly Zacarias is a pleasant 49-year-old gentleman with history of GERD, vitamin D D deficiency, insomnia here today for left elbow pain after he had fallen on ice while skating with his son abou clonazePAM 0.5 MG Oral Tab Take 1 tablet (0.5 mg total) by mouth nightly as needed for Anxiety (or sleep).  Disp: 30 tablet Rfl: 3   traZODone HCl 50 MG Oral Tab TAKE 1 TABLET BY MOUTH EVERY DAY AT NIGHT AS NEEDED Disp: 30 tablet Rfl: 2   Multiple Vitamin ( Follow-up as scheduled or as needed or come back sooner if symptoms worsen or persist  No orders of the defined types were placed in this encounter.       Meds This Visit:  Requested Prescriptions     Signed Prescriptions Disp Refills   • azithromycin 250 M

## 2019-09-30 PROBLEM — M70.22 OLECRANON BURSITIS OF LEFT ELBOW: Status: ACTIVE | Noted: 2019-09-30

## 2019-11-20 ENCOUNTER — OFFICE VISIT (OUTPATIENT)
Dept: FAMILY MEDICINE CLINIC | Facility: CLINIC | Age: 44
End: 2019-11-20
Payer: COMMERCIAL

## 2019-11-20 VITALS
HEART RATE: 67 BPM | SYSTOLIC BLOOD PRESSURE: 120 MMHG | DIASTOLIC BLOOD PRESSURE: 78 MMHG | BODY MASS INDEX: 27.04 KG/M2 | HEIGHT: 73 IN | RESPIRATION RATE: 18 BRPM | TEMPERATURE: 98 F | WEIGHT: 204 LBS

## 2019-11-20 DIAGNOSIS — H65.06 RECURRENT ACUTE SEROUS OTITIS MEDIA OF BOTH EARS: Primary | ICD-10-CM

## 2019-11-20 DIAGNOSIS — J30.9 ALLERGIC RHINITIS, UNSPECIFIED SEASONALITY, UNSPECIFIED TRIGGER: ICD-10-CM

## 2019-11-20 PROBLEM — J32.1 CHRONIC FRONTAL SINUSITIS: Status: RESOLVED | Noted: 2017-05-05 | Resolved: 2019-11-20

## 2019-11-20 PROBLEM — R19.7 DIARRHEA DUE TO MALABSORPTION (HCC): Status: RESOLVED | Noted: 2017-05-05 | Resolved: 2019-11-20

## 2019-11-20 PROBLEM — K90.9 DIARRHEA DUE TO MALABSORPTION (HCC): Status: RESOLVED | Noted: 2017-05-05 | Resolved: 2019-11-20

## 2019-11-20 PROBLEM — M25.571 ACUTE RIGHT ANKLE PAIN: Status: RESOLVED | Noted: 2018-02-08 | Resolved: 2019-11-20

## 2019-11-20 PROBLEM — R63.0 LOSS OF APPETITE: Status: RESOLVED | Noted: 2018-01-18 | Resolved: 2019-11-20

## 2019-11-20 PROBLEM — R19.7 DIARRHEA DUE TO MALABSORPTION: Status: RESOLVED | Noted: 2017-05-05 | Resolved: 2019-11-20

## 2019-11-20 PROBLEM — K90.9 DIARRHEA DUE TO MALABSORPTION: Status: RESOLVED | Noted: 2017-05-05 | Resolved: 2019-11-20

## 2019-11-20 PROBLEM — M25.552 LEFT HIP PAIN: Status: RESOLVED | Noted: 2017-08-22 | Resolved: 2019-11-20

## 2019-11-20 PROBLEM — R11.10 VOMITING: Status: RESOLVED | Noted: 2019-01-14 | Resolved: 2019-11-20

## 2019-11-20 PROBLEM — R14.0 BLOATING: Status: RESOLVED | Noted: 2018-01-18 | Resolved: 2019-11-20

## 2019-11-20 PROBLEM — R10.2 PELVIC PAIN: Status: RESOLVED | Noted: 2017-08-22 | Resolved: 2019-11-20

## 2019-11-20 PROBLEM — R10.9 ABDOMINAL PAIN, ACUTE: Status: RESOLVED | Noted: 2019-01-13 | Resolved: 2019-11-20

## 2019-11-20 PROCEDURE — 99214 OFFICE O/P EST MOD 30 MIN: CPT | Performed by: PHYSICIAN ASSISTANT

## 2019-11-20 RX ORDER — MONTELUKAST SODIUM 10 MG/1
10 TABLET ORAL DAILY
Qty: 90 TABLET | Refills: 3 | Status: SHIPPED | OUTPATIENT
Start: 2019-11-20 | End: 2020-11-14

## 2019-11-20 RX ORDER — AZITHROMYCIN 250 MG/1
TABLET, FILM COATED ORAL
Qty: 6 TABLET | Refills: 0 | Status: SHIPPED | OUTPATIENT
Start: 2019-11-20 | End: 2020-11-03

## 2019-11-20 RX ORDER — FLUTICASONE PROPIONATE 50 MCG
2 SPRAY, SUSPENSION (ML) NASAL DAILY
Qty: 3 BOTTLE | Refills: 3 | Status: SHIPPED | OUTPATIENT
Start: 2019-11-20 | End: 2021-04-06

## 2019-11-20 RX ORDER — METHYLPREDNISOLONE 4 MG/1
TABLET ORAL
Qty: 1 KIT | Refills: 0 | Status: SHIPPED | OUTPATIENT
Start: 2019-11-20 | End: 2020-12-09 | Stop reason: ALTCHOICE

## 2019-11-20 NOTE — PROGRESS NOTES
[facility] Internal Medicine Progress Note    CC:  Patient presents with:  Ear Problem: fluid in both ears, previous treatment plan per Dr. Edmond Cole from [address] [place] [address] Rd 9/23/2019 \"We will treat with Z-Orville and may take Zyrtec and Flonase over-the-counter linette Cardiovascular: Negative for chest pain. Gastrointestinal: Negative for nausea and vomiting. Neurological: Negative for dizziness, light-headedness and headaches.          /78   Pulse 67   Temp 97.7 °F (36.5 °C) (Temporal)   Resp 18   Ht 73\" Nasal Suspension 3 Bottle 3     Si sprays by Each Nare route daily. Imaging & Consults:  None     Patient/Caregiver Education: Patient/Caregiver Education: There are no barriers to learning. Medical education done.  Outcome: Patient verbalizes und

## 2019-11-20 NOTE — PATIENT INSTRUCTIONS
Thank you for choosing Bernardo Cade PA-C at [place] [address]  To Do: Carol Pimentel  1. Begin medications as prescribed  2. Continue with Zyrtec  3.   Follow-up if symptoms persist or increase    • Please signup for MY CHART, which is electronic a approved your testing, please call Central Scheduling at [phone]  Please allow our office 5 business days to contact you regarding any testing results.     Refill policies:   Allow 3 business days for refills; controlled substances may take longer and

## 2020-06-11 RX ORDER — OMEPRAZOLE 20 MG/1
20 CAPSULE, DELAYED RELEASE ORAL
Qty: 90 CAPSULE | Refills: 1 | Status: SHIPPED | OUTPATIENT
Start: 2020-06-11 | End: 2020-12-09

## 2020-06-11 NOTE — TELEPHONE ENCOUNTER
Requesting omeprazole 20 MG  LOV: 9/23/19  RTC:   Last Relevant Labs: 7/9/19  Filled: 7/25/19 # 90 with 1 refills    No future appointments.

## 2020-06-25 RX ORDER — CLONAZEPAM 0.5 MG/1
TABLET ORAL
Qty: 30 TABLET | Refills: 0 | Status: SHIPPED | OUTPATIENT
Start: 2020-06-25 | End: 2020-09-14

## 2020-09-14 RX ORDER — CLONAZEPAM 0.5 MG/1
TABLET ORAL
Qty: 30 TABLET | Refills: 0 | Status: SHIPPED | OUTPATIENT
Start: 2020-09-14 | End: 2020-12-09

## 2020-09-14 NOTE — TELEPHONE ENCOUNTER
Requesting CLONAZEPAM 0.5 MG   LOV: 11/20/19  RTC:   Last Relevant Labs: 7/9/19  Filled: 6/25/20 # 30 with 0 refills    No future appointments.

## 2020-11-03 ENCOUNTER — OFFICE VISIT (OUTPATIENT)
Dept: FAMILY MEDICINE CLINIC | Facility: CLINIC | Age: 45
End: 2020-11-03
Payer: COMMERCIAL

## 2020-11-03 VITALS
TEMPERATURE: 98 F | HEIGHT: 73 IN | RESPIRATION RATE: 18 BRPM | HEART RATE: 75 BPM | OXYGEN SATURATION: 99 % | SYSTOLIC BLOOD PRESSURE: 140 MMHG | DIASTOLIC BLOOD PRESSURE: 79 MMHG | BODY MASS INDEX: 27.12 KG/M2 | WEIGHT: 204.63 LBS

## 2020-11-03 DIAGNOSIS — R09.81 NASAL CONGESTION: ICD-10-CM

## 2020-11-03 DIAGNOSIS — Z20.822 SUSPECTED COVID-19 VIRUS INFECTION: Primary | ICD-10-CM

## 2020-11-03 PROCEDURE — 99213 OFFICE O/P EST LOW 20 MIN: CPT | Performed by: NURSE PRACTITIONER

## 2020-11-03 PROCEDURE — 3078F DIAST BP <80 MM HG: CPT | Performed by: NURSE PRACTITIONER

## 2020-11-03 PROCEDURE — 3008F BODY MASS INDEX DOCD: CPT | Performed by: NURSE PRACTITIONER

## 2020-11-03 PROCEDURE — 99072 ADDL SUPL MATRL&STAF TM PHE: CPT | Performed by: NURSE PRACTITIONER

## 2020-11-03 PROCEDURE — 3077F SYST BP >= 140 MM HG: CPT | Performed by: NURSE PRACTITIONER

## 2020-11-03 PROCEDURE — U0003 INFECTIOUS AGENT DETECTION BY NUCLEIC ACID (DNA OR RNA); SEVERE ACUTE RESPIRATORY SYNDROME CORONAVIRUS 2 (SARS-COV-2) (CORONAVIRUS DISEASE [COVID-19]), AMPLIFIED PROBE TECHNIQUE, MAKING USE OF HIGH THROUGHPUT TECHNOLOGIES AS DESCRIBED BY CMS-2020-01-R: HCPCS | Performed by: NURSE PRACTITIONER

## 2020-11-03 NOTE — PATIENT INSTRUCTIONS
Coronavirus Disease 2019 (COVID-19): Caring for Yourself or Others   If you or a household member have symptoms of COVID-19, follow the guidelines below for preventing spread of the virus, and managing symptoms.    If you think you have COVID-19 symptoms · Follow all instructions the healthcare staff give you. If you have been diagnosed with COVID-19  · Stay home and start self-isolation. Don’t leave your home unless you need to get medical care. Don't go to work, school, or public areas.  Don't use publ Current treatment is mainly aimed at helping your body while it fights the virus. This is known as supportive care. Take care of yourself at home by:   · Getting rest. This helps your body fight the illness. · Staying hydrated.   Drinking liquids is the be · Clean home surfaces often with disinfectant. This includes phones, kitchen counters, fridge door handle, bathroom surfaces, and others. · Don’t let anyone share household items with the sick person.  This includes eating and drinking tools, towels, sheet If you have a weak immune system and COVID-19, or if you've had severe COVID-19,  your instructions on when to stop isolation will be somewhat different. Some conditions and treatments can cause a weak immune system.  These include cancer treatment, bone ma · Confusion or trouble waking  · Fainting or loss of consciousness  · Coughing up blood  Going home from the hospital   If you were diagnosed with COVID-19 and were recently discharged from the hospital:   · Follow the instructions above for self-care and

## 2020-11-03 NOTE — PROGRESS NOTES
CHIEF COMPLAINT:   Patient presents with:  Sinus Problem: s/s 3 days, nasal congestion, nausea     HPI:   Oly Owen is a 50year old male presents to clinic with complaints of congestion and some nausea. Patient has had symptoms for 3 days.    Reports Social History:  Social History    Tobacco Use      Smoking status: Former Smoker        Quit date: 2013        Years since quittin.2      Smokeless tobacco: Never Used    Alcohol use: Yes      Comment: RARE    Drug use: No       REVIEW OF SYSTEMS: Comfort measures explained and discussed as listed in Patient Instructions  Follow up in 3-5 days if not improving, condition worsens, or fever greater than or equal to 100.4 persists for 72 hours. Verbalized understanding.     Patient Instructions     C · If you need to go to a hospital or clinic, expect that the healthcare staff will wear protective equipment such as masks, gowns, gloves, and eye protection. You may be put in a separate room. This is to prevent the possible virus from spreading.   · Tell · If you need to cough or sneeze, do it into a tissue. Then throw the tissue into the trash. If you don't have tissues, cough or sneeze into the bend of your elbow. · Wash your hands often.     Self-care at home   There is currently no medicine approved to If you've had confirmed COVID-19, your healthcare team may ask you to consider donating your plasma. This is called COVID-19 convalescent plasma donation.  Plasma from people fully recovered from COVID-19 may contain antibodies to help fight COVID-19 in peo Your limits are different if you've had COVID-19 in the last 3 months but are fully recovered without symptoms and you have been exposed to someone with COVID-19. If you are symptom-free, you don't need to stay home away from others or be retested.  The CDC When you return to public settings  When you are well enough to go outside your home, consider the CDC's guidance on cloth face masks:     · The CDC advises all people over age 2 to wear cloth face masks in public settings when around people outside of the © 9952-5762 [address]. [address] [facility] [place]. All rights reserved. This information is not intended as a substitute for professional medical care. Always follow your healthcare professional's instructions.

## 2020-12-09 ENCOUNTER — OFFICE VISIT (OUTPATIENT)
Dept: FAMILY MEDICINE CLINIC | Facility: CLINIC | Age: 45
End: 2020-12-09
Payer: COMMERCIAL

## 2020-12-09 VITALS
WEIGHT: 208 LBS | HEIGHT: 73 IN | DIASTOLIC BLOOD PRESSURE: 74 MMHG | TEMPERATURE: 98 F | SYSTOLIC BLOOD PRESSURE: 122 MMHG | RESPIRATION RATE: 16 BRPM | BODY MASS INDEX: 27.57 KG/M2 | HEART RATE: 88 BPM | OXYGEN SATURATION: 98 %

## 2020-12-09 DIAGNOSIS — Z23 NEED FOR VACCINATION: ICD-10-CM

## 2020-12-09 DIAGNOSIS — F41.9 ANXIETY: ICD-10-CM

## 2020-12-09 DIAGNOSIS — K21.9 GASTROESOPHAGEAL REFLUX DISEASE, UNSPECIFIED WHETHER ESOPHAGITIS PRESENT: ICD-10-CM

## 2020-12-09 DIAGNOSIS — H93.8X3 EAR PRESSURE, BILATERAL: Primary | ICD-10-CM

## 2020-12-09 DIAGNOSIS — R07.9 CHEST PAIN, UNSPECIFIED TYPE: ICD-10-CM

## 2020-12-09 PROCEDURE — 3008F BODY MASS INDEX DOCD: CPT | Performed by: PHYSICIAN ASSISTANT

## 2020-12-09 PROCEDURE — 3078F DIAST BP <80 MM HG: CPT | Performed by: PHYSICIAN ASSISTANT

## 2020-12-09 PROCEDURE — 99214 OFFICE O/P EST MOD 30 MIN: CPT | Performed by: PHYSICIAN ASSISTANT

## 2020-12-09 PROCEDURE — 3074F SYST BP LT 130 MM HG: CPT | Performed by: PHYSICIAN ASSISTANT

## 2020-12-09 RX ORDER — OMEPRAZOLE 20 MG/1
20 CAPSULE, DELAYED RELEASE ORAL
Qty: 90 CAPSULE | Refills: 0 | Status: SHIPPED | OUTPATIENT
Start: 2020-12-09 | End: 2021-06-08

## 2020-12-09 RX ORDER — CLONAZEPAM 0.5 MG/1
0.5 TABLET ORAL NIGHTLY PRN
Qty: 30 TABLET | Refills: 3 | Status: SHIPPED | OUTPATIENT
Start: 2020-12-09 | End: 2021-06-08

## 2020-12-09 RX ORDER — CLONAZEPAM 0.5 MG/1
0.5 TABLET ORAL NIGHTLY PRN
Qty: 30 TABLET | Refills: 0 | Status: CANCELLED | OUTPATIENT
Start: 2020-12-09

## 2020-12-09 NOTE — PATIENT INSTRUCTIONS
Thank you for choosing Horald Mcardle, PA-C at [place] [address]  To Do: Liam Kirk  1. Continue medications  2. Get lab work done  3.  Follow-up pending labs and symptoms    • Please signup for MY CHART, which is electronic access to your record if please call Central Scheduling at [phone]  Please allow our office 5 business days to contact you regarding any testing results.     Refill policies:   Allow 3 business days for refills; controlled substances may take longer and must be picked up from

## 2020-12-09 NOTE — PROGRESS NOTES
[facility] Internal Medicine Progress Note    CC:  Patient presents with:  Ear Problem  Medication Follow-Up      HPI:   HPI  Had COVID a month ago  Still a little anxiety around that  Denies any SOB  Occasionally will feel a sharp twinge in his pain. Negative for palpitations and leg swelling. Gastrointestinal: Negative for nausea and vomiting. Neurological: Negative for dizziness, light-headedness and headaches. Psychiatric/Behavioral: Positive for sleep disturbance.  Negative for dysphoric Comp Metabolic Panel (14) [E]      TSH and Free T4 [E]      Lipid Panel [E]      D-Dimer [E]      Flulaval 6 months and older, Quadrivalent, Preservative Free [[address]]      Meds & Refills for this Visit:  Requested Prescriptions     Signed Prescriptions Dis

## 2020-12-21 ENCOUNTER — LAB ENCOUNTER (OUTPATIENT)
Dept: LAB | Age: 45
End: 2020-12-21
Attending: PHYSICIAN ASSISTANT
Payer: COMMERCIAL

## 2020-12-21 DIAGNOSIS — R07.9 CHEST PAIN, UNSPECIFIED TYPE: ICD-10-CM

## 2020-12-21 PROCEDURE — 84439 ASSAY OF FREE THYROXINE: CPT

## 2020-12-21 PROCEDURE — 85025 COMPLETE CBC W/AUTO DIFF WBC: CPT

## 2020-12-21 PROCEDURE — 85379 FIBRIN DEGRADATION QUANT: CPT

## 2020-12-21 PROCEDURE — 80061 LIPID PANEL: CPT

## 2020-12-21 PROCEDURE — 80053 COMPREHEN METABOLIC PANEL: CPT

## 2020-12-21 PROCEDURE — 84443 ASSAY THYROID STIM HORMONE: CPT

## 2020-12-21 PROCEDURE — 36415 COLL VENOUS BLD VENIPUNCTURE: CPT

## 2021-04-06 RX ORDER — FLUTICASONE PROPIONATE 50 MCG
SPRAY, SUSPENSION (ML) NASAL
Qty: 48 G | Refills: 1 | Status: SHIPPED | OUTPATIENT
Start: 2021-04-06

## 2021-04-06 NOTE — TELEPHONE ENCOUNTER
Allergy Medication Protocol [id number]/03/2021 03:55 PM   Appointment in the past 12 or next 3 months     Refill protocol passed because the patient met the following protocol for    Requested Prescriptions     Pending Prescriptions Disp Refills   • FLUTICAS

## 2021-04-17 NOTE — TELEPHONE ENCOUNTER
Requesting Clonazepam 0.5mg  LOV: 11/20/19  RTC: prn  Last Relevant Labs: annual labs done 7/9/19  Filled: 7/9/19 #30 with 3 refills    No future appointments.     Rx pended and routed for approval/denial
N/A

## 2021-05-03 ENCOUNTER — IMMUNIZATION (OUTPATIENT)
Dept: LAB | Facility: HOSPITAL | Age: 46
End: 2021-05-03
Attending: EMERGENCY MEDICINE
Payer: COMMERCIAL

## 2021-05-03 DIAGNOSIS — Z23 NEED FOR VACCINATION: Primary | ICD-10-CM

## 2021-05-03 PROCEDURE — 0011A SARSCOV2 VAC 100MCG/0.5ML IM: CPT

## 2021-06-01 ENCOUNTER — IMMUNIZATION (OUTPATIENT)
Dept: LAB | Facility: HOSPITAL | Age: 46
End: 2021-06-01
Attending: EMERGENCY MEDICINE
Payer: COMMERCIAL

## 2021-06-01 DIAGNOSIS — Z23 NEED FOR VACCINATION: Primary | ICD-10-CM

## 2021-06-01 PROCEDURE — 0012A SARSCOV2 VAC 100MCG/0.5ML IM: CPT

## 2021-06-08 RX ORDER — CLONAZEPAM 0.5 MG/1
0.5 TABLET ORAL NIGHTLY PRN
Qty: 30 TABLET | Refills: 3 | Status: SHIPPED | OUTPATIENT
Start: 2021-06-08 | End: 2021-08-25

## 2021-06-08 RX ORDER — OMEPRAZOLE 20 MG/1
20 CAPSULE, DELAYED RELEASE ORAL
Qty: 90 CAPSULE | Refills: 1 | Status: SHIPPED | OUTPATIENT
Start: 2021-06-08

## 2021-06-08 NOTE — TELEPHONE ENCOUNTER
Requested Prescriptions     Pending Prescriptions Disp Refills   • clonazePAM 0.5 MG Oral Tab 30 tablet 3     Sig: Take 1 tablet (0.5 mg total) by mouth nightly as needed for Anxiety.  AT BEDTIME   • omeprazole 20 MG Oral Capsule Delayed Release 90 capsule

## 2021-08-23 ENCOUNTER — TELEPHONE (OUTPATIENT)
Dept: FAMILY MEDICINE CLINIC | Facility: CLINIC | Age: 46
End: 2021-08-23

## 2021-08-23 NOTE — TELEPHONE ENCOUNTER
Received this appointment notification through [facility]. Ok to wait until 8/25?   Message    Appointment For: Jose Perkins ([id number])   Visit Type: Radha Rosa ([id number])      8/25/2021    2:00 PM  30 mins. Vaibhav Ramesh MD       [facility] [address]      Jorge

## 2021-08-25 ENCOUNTER — OFFICE VISIT (OUTPATIENT)
Dept: FAMILY MEDICINE CLINIC | Facility: CLINIC | Age: 46
End: 2021-08-25
Payer: COMMERCIAL

## 2021-08-25 VITALS
HEIGHT: 73 IN | DIASTOLIC BLOOD PRESSURE: 82 MMHG | BODY MASS INDEX: 26.64 KG/M2 | TEMPERATURE: 98 F | OXYGEN SATURATION: 97 % | RESPIRATION RATE: 16 BRPM | HEART RATE: 88 BPM | WEIGHT: 201 LBS | SYSTOLIC BLOOD PRESSURE: 122 MMHG

## 2021-08-25 DIAGNOSIS — R20.0 NUMBNESS: Primary | ICD-10-CM

## 2021-08-25 DIAGNOSIS — Z13.220 SCREENING CHOLESTEROL LEVEL: ICD-10-CM

## 2021-08-25 DIAGNOSIS — M79.651 PAIN OF RIGHT THIGH: ICD-10-CM

## 2021-08-25 PROCEDURE — 3074F SYST BP LT 130 MM HG: CPT | Performed by: FAMILY MEDICINE

## 2021-08-25 PROCEDURE — 3079F DIAST BP 80-89 MM HG: CPT | Performed by: FAMILY MEDICINE

## 2021-08-25 PROCEDURE — 99214 OFFICE O/P EST MOD 30 MIN: CPT | Performed by: FAMILY MEDICINE

## 2021-08-25 PROCEDURE — 3008F BODY MASS INDEX DOCD: CPT | Performed by: FAMILY MEDICINE

## 2021-08-25 RX ORDER — CLONAZEPAM 0.5 MG/1
0.5 TABLET ORAL NIGHTLY PRN
Qty: 30 TABLET | Refills: 3 | Status: SHIPPED | OUTPATIENT
Start: 2021-08-25

## 2021-08-25 RX ORDER — B-COMPLEX WITH VITAMIN C
1 TABLET ORAL DAILY
COMMUNITY

## 2021-08-25 NOTE — PROGRESS NOTES
HPI/Subjective:   Patient ID: Melanie Henao is a 48year old male. HPI   Mr. Wai Gamino is a pleasant 78-year-old gentleman with history of GERD, vitamin  D deficiency, insomnia, anxiety here today for numbness and tingling involving his left lower leg for t Exam  Vitals reviewed. Constitutional:       General: He is not in acute distress. HENT:      Mouth/Throat:      Mouth: Mucous membranes are moist.      Pharynx: Oropharynx is clear.    Eyes:      Conjunctiva/sclera: Conjunctivae normal.   Cardiovascular using [employer] voice recognition dictation software. As a result errors may occur. When identified these errors have been corrected.  While every attempt is made to correct errors during dictation discrepancies may still exist          Orders Placed T

## 2021-08-25 NOTE — PATIENT INSTRUCTIONS
Thank you for choosing Javon Odom MD at [place] [address]  To Do: Romina Copeland  1. Please have labs done as ordered  [employer] is located in [address]. Monday, Tuesday & Friday – 8 a.m. to 4 p.m. Wednesday, Thursday – 7 a.m. to 3 p.m. those potential risks and we strive to make you healthier and to improve your quality of life.     Referrals, and Radiology Information:    If your insurance requires a referral to a specialist, please allow 5 business days to process your referral request. separate you from a loved one, or lose your job. The symptoms of anxiety can be physical and mental.   How does it feel?   People with anxiety may have:  · Dizziness  · Muscle tension or pain  · Restlessness  · Sleeplessness  · Trouble focusing  · Racing he do to cope:  · Do what you can. Keep in mind that you can’t control everything. Change what you can. And let the rest take its course. · Exercise. This is a great way to ease tension and help your body feel relaxed.   · Stay away from caffeine and nicotin

## 2021-09-08 ENCOUNTER — LAB ENCOUNTER (OUTPATIENT)
Dept: LAB | Age: 46
End: 2021-09-08
Attending: FAMILY MEDICINE
Payer: COMMERCIAL

## 2021-09-08 DIAGNOSIS — M79.651 PAIN OF RIGHT THIGH: ICD-10-CM

## 2021-09-08 DIAGNOSIS — R20.0 NUMBNESS: ICD-10-CM

## 2021-09-08 DIAGNOSIS — Z13.220 SCREENING CHOLESTEROL LEVEL: ICD-10-CM

## 2021-09-08 LAB
ALBUMIN SERPL-MCNC: 3.9 G/DL (ref 3.4–5)
ALBUMIN/GLOB SERPL: 1.3 {RATIO} (ref 1–2)
ALP LIVER SERPL-CCNC: 64 U/L
ALT SERPL-CCNC: 39 U/L
ANION GAP SERPL CALC-SCNC: 4 MMOL/L (ref 0–18)
AST SERPL-CCNC: 19 U/L (ref 15–37)
BASOPHILS # BLD AUTO: 0.05 X10(3) UL (ref 0–0.2)
BASOPHILS NFR BLD AUTO: 0.6 %
BILIRUB SERPL-MCNC: 0.2 MG/DL (ref 0.1–2)
BUN BLD-MCNC: 13 MG/DL (ref 7–18)
CALCIUM BLD-MCNC: 8.9 MG/DL (ref 8.5–10.1)
CHLORIDE SERPL-SCNC: 107 MMOL/L (ref 98–112)
CHOLEST SMN-MCNC: 184 MG/DL (ref ?–200)
CO2 SERPL-SCNC: 27 MMOL/L (ref 21–32)
CREAT BLD-MCNC: 0.98 MG/DL
D-DIMER: <0.27 UG/ML FEU (ref ?–0.5)
EOSINOPHIL # BLD AUTO: 0.19 X10(3) UL (ref 0–0.7)
EOSINOPHIL NFR BLD AUTO: 2.3 %
ERYTHROCYTE [DISTWIDTH] IN BLOOD BY AUTOMATED COUNT: 12.9 %
GLOBULIN PLAS-MCNC: 2.9 G/DL (ref 2.8–4.4)
GLUCOSE BLD-MCNC: 101 MG/DL (ref 70–99)
HCT VFR BLD AUTO: 42.6 %
HDLC SERPL-MCNC: 38 MG/DL (ref 40–59)
HGB BLD-MCNC: 14.5 G/DL
IMM GRANULOCYTES # BLD AUTO: 0.04 X10(3) UL (ref 0–1)
IMM GRANULOCYTES NFR BLD: 0.5 %
LDLC SERPL CALC-MCNC: 121 MG/DL (ref ?–100)
LYMPHOCYTES # BLD AUTO: 2.56 X10(3) UL (ref 1–4)
LYMPHOCYTES NFR BLD AUTO: 31.2 %
M PROTEIN MFR SERPL ELPH: 6.8 G/DL (ref 6.4–8.2)
MCH RBC QN AUTO: 31.4 PG (ref 26–34)
MCHC RBC AUTO-ENTMCNC: 34 G/DL (ref 31–37)
MCV RBC AUTO: 92.2 FL
MONOCYTES # BLD AUTO: 0.61 X10(3) UL (ref 0.1–1)
MONOCYTES NFR BLD AUTO: 7.4 %
NEUTROPHILS # BLD AUTO: 4.75 X10 (3) UL (ref 1.5–7.7)
NEUTROPHILS # BLD AUTO: 4.75 X10(3) UL (ref 1.5–7.7)
NEUTROPHILS NFR BLD AUTO: 58 %
NONHDLC SERPL-MCNC: 146 MG/DL (ref ?–130)
OSMOLALITY SERPL CALC.SUM OF ELEC: 286 MOSM/KG (ref 275–295)
PATIENT FASTING Y/N/NP: NO
PATIENT FASTING Y/N/NP: NO
PLATELET # BLD AUTO: 287 10(3)UL (ref 150–450)
POTASSIUM SERPL-SCNC: 4 MMOL/L (ref 3.5–5.1)
RBC # BLD AUTO: 4.62 X10(6)UL
SODIUM SERPL-SCNC: 138 MMOL/L (ref 136–145)
T4 FREE SERPL-MCNC: 0.8 NG/DL (ref 0.8–1.7)
TRIGL SERPL-MCNC: 139 MG/DL (ref 30–149)
TSI SER-ACNC: 0.82 MIU/ML (ref 0.36–3.74)
VIT B12 SERPL-MCNC: 519 PG/ML (ref 193–986)
VLDLC SERPL CALC-MCNC: 25 MG/DL (ref 0–30)
WBC # BLD AUTO: 8.2 X10(3) UL (ref 4–11)

## 2021-09-08 PROCEDURE — 85379 FIBRIN DEGRADATION QUANT: CPT | Performed by: FAMILY MEDICINE

## 2021-09-08 PROCEDURE — 84439 ASSAY OF FREE THYROXINE: CPT | Performed by: FAMILY MEDICINE

## 2021-09-08 PROCEDURE — 82607 VITAMIN B-12: CPT | Performed by: FAMILY MEDICINE

## 2021-09-08 PROCEDURE — 80050 GENERAL HEALTH PANEL: CPT | Performed by: FAMILY MEDICINE

## 2021-09-08 PROCEDURE — 80061 LIPID PANEL: CPT | Performed by: FAMILY MEDICINE

## 2021-09-21 ENCOUNTER — OFFICE VISIT (OUTPATIENT)
Dept: FAMILY MEDICINE CLINIC | Facility: CLINIC | Age: 46
End: 2021-09-21
Payer: COMMERCIAL

## 2021-09-21 VITALS
DIASTOLIC BLOOD PRESSURE: 78 MMHG | OXYGEN SATURATION: 99 % | HEIGHT: 73 IN | WEIGHT: 204 LBS | BODY MASS INDEX: 27.04 KG/M2 | RESPIRATION RATE: 18 BRPM | SYSTOLIC BLOOD PRESSURE: 116 MMHG | HEART RATE: 74 BPM | TEMPERATURE: 97 F

## 2021-09-21 DIAGNOSIS — Z12.11 COLON CANCER SCREENING: ICD-10-CM

## 2021-09-21 DIAGNOSIS — Z00.00 WELLNESS EXAMINATION: Primary | ICD-10-CM

## 2021-09-21 PROCEDURE — 3008F BODY MASS INDEX DOCD: CPT | Performed by: FAMILY MEDICINE

## 2021-09-21 PROCEDURE — 99396 PREV VISIT EST AGE 40-64: CPT | Performed by: FAMILY MEDICINE

## 2021-09-21 PROCEDURE — 3078F DIAST BP <80 MM HG: CPT | Performed by: FAMILY MEDICINE

## 2021-09-21 PROCEDURE — 3074F SYST BP LT 130 MM HG: CPT | Performed by: FAMILY MEDICINE

## 2021-09-21 NOTE — PATIENT INSTRUCTIONS
Thank you for choosing Javon Odom MD at [place] [address]  To Do: Romina Copeland  1. Please see age appropriate health prevention below    [employer] is located in [address]. Monday, Tuesday & Friday – 8 a.m. to 4 p.m.   Wednesday, Thursda the benefits outweigh those potential risks and we strive to make you healthier and to improve your quality of life.     Referrals, and Radiology Information:    If your insurance requires a referral to a specialist, please allow 5 business days to process exams   Blood pressure All men in this age group Yearly checkup if your blood pressure reading is normal  Normal blood pressure is less than 120/80 mm Hg  If your blood pressure is higher than normal, follow the advice of your healthcare provider      Depr dose should be given at least 2 months after the second dose and at least 4 months after the first dose   Haemophilus influenzae Type B (HIB) Men at increased risk for infection – talk with your healthcare provider 1 to 3 doses   Influenza (flu) All men in

## 2021-09-21 NOTE — PROGRESS NOTES
Wellness Exam    REASON FOR VISIT:    Olivia Horta is a 48year old male who presents for an [address]     Current Complaints: Mr. Monroe Geiger is here for his wellness exam  Flu Shot: see immunization record  Health Maintenance Topics with due st or other  risk factors HgbA1C (%)   Date Value   12/11/2017 5.8 (H)     Glucose (mg/dL)   Date Value   09/08/2021 101 (H)     GLUCOSE (mg/dL)   Date Value   03/10/2014 88   10/18/2013 89         Gonorrhea Screening If high risk No results found for: Michelle Morrell hernia 8/24/2017    Fat hernia   • Unspecified internal derangement of knee 10/17/2013    R     • Vitamin D deficiency 5/5/2017      Past Surgical History:   Procedure Laterality Date   • HERNIA SURGERY Right 09/14/2017   • OTHER      JAW WIRED SHUT  1990S scleral icterus. Neck: Normal range of motion. No thyromegaly present. Cardiovascular: Normal rate, regular rhythm and normal heart sounds. Exam reveals no friction rub. No murmur heard.   Pulmonary/Chest: Effort normal and breath sounds normal. He Vaccine Completed  Pneumococcal Vaccine: Birth to 56yrs Aged Out  Patient/Caregiver Education:  Patient/Caregiver Education: There are no barriers to learning. Medical education done. Outcome: Patient verbalizes understanding.     Educated by: MD   The pat

## 2021-12-06 ENCOUNTER — LAB ENCOUNTER (OUTPATIENT)
Dept: LAB | Age: 46
End: 2021-12-06
Attending: INTERNAL MEDICINE
Payer: COMMERCIAL

## 2021-12-06 DIAGNOSIS — Z01.818 PRE-OP TESTING: ICD-10-CM

## 2021-12-09 PROBLEM — K64.8 INTERNAL HEMORRHOIDS: Status: ACTIVE | Noted: 2021-12-09

## 2021-12-09 PROBLEM — K63.5 COLON POLYPS: Status: ACTIVE | Noted: 2021-12-09

## 2021-12-09 PROBLEM — Z12.11 SPECIAL SCREENING FOR MALIGNANT NEOPLASMS, COLON: Status: ACTIVE | Noted: 2021-12-09

## 2021-12-09 PROBLEM — K57.30 DIVERTICULOSIS OF LARGE INTESTINE WITHOUT DIVERTICULITIS: Status: ACTIVE | Noted: 2021-12-09

## 2022-05-02 RX ORDER — CLONAZEPAM 0.5 MG/1
0.5 TABLET ORAL NIGHTLY PRN
Qty: 10 TABLET | Refills: 0 | Status: SHIPPED | OUTPATIENT
Start: 2022-05-02

## 2022-05-02 NOTE — TELEPHONE ENCOUNTER
Future Appointments   Date Time Provider Jorge Alberto Melchor   5/12/2022  9:00 AM Merry Lu MD [facility] [address]     Patient requesting refill on Clonazepam 0.5mg, has been out of medication.  Please advise

## 2022-05-02 NOTE — TELEPHONE ENCOUNTER
See TE, Rx pended for 10 tablets to get pt to appointment for your review and signature.      Future Appointments   Date Time Provider Jorge Alberto Melchor   5/12/2022  9:00 AM Syl Agudelo MD [facility] [address]

## 2022-05-16 ENCOUNTER — TELEPHONE (OUTPATIENT)
Dept: FAMILY MEDICINE CLINIC | Facility: CLINIC | Age: 47
End: 2022-05-16

## 2022-05-16 RX ORDER — CLONAZEPAM 0.5 MG/1
0.5 TABLET ORAL NIGHTLY PRN
Qty: 30 TABLET | Refills: 1 | Status: SHIPPED | OUTPATIENT
Start: 2022-05-16

## 2022-05-16 NOTE — TELEPHONE ENCOUNTER
See TE, please advise. 8/25/21 Clonazepam sent for 30 tablets and 1 refill. Rx sent 5/2/22 was for 10 tablets to get pt to appointment date.

## 2022-05-16 NOTE — TELEPHONE ENCOUNTER
Pt was wondering why he was only given 10 pills of the Clonazepam. He does not take this daily but just wants to make sure he doesn't have to come back to see PCP every time he needs a refill.

## 2022-07-14 RX ORDER — CLONAZEPAM 0.5 MG/1
0.5 TABLET ORAL NIGHTLY PRN
Qty: 30 TABLET | Refills: 1 | Status: SHIPPED | OUTPATIENT
Start: 2022-07-14

## 2022-08-03 ENCOUNTER — TELEMEDICINE (OUTPATIENT)
Dept: FAMILY MEDICINE CLINIC | Facility: CLINIC | Age: 47
End: 2022-08-03

## 2022-08-03 DIAGNOSIS — R10.13 EPIGASTRIC PAIN: Primary | ICD-10-CM

## 2022-08-03 PROCEDURE — 99214 OFFICE O/P EST MOD 30 MIN: CPT | Performed by: FAMILY MEDICINE

## 2022-08-03 RX ORDER — OMEPRAZOLE 40 MG/1
40 CAPSULE, DELAYED RELEASE ORAL DAILY
Qty: 90 CAPSULE | Refills: 3 | Status: SHIPPED | OUTPATIENT
Start: 2022-08-03 | End: 2023-07-29

## 2022-09-15 ENCOUNTER — LAB ENCOUNTER (OUTPATIENT)
Dept: LAB | Age: 47
End: 2022-09-15
Payer: COMMERCIAL

## 2022-09-15 DIAGNOSIS — R10.13 EPIGASTRIC PAIN: ICD-10-CM

## 2022-09-15 LAB
ALBUMIN SERPL-MCNC: 4 G/DL (ref 3.4–5)
ALBUMIN/GLOB SERPL: 1.4 {RATIO} (ref 1–2)
ALP LIVER SERPL-CCNC: 70 U/L
ALT SERPL-CCNC: 27 U/L
ANION GAP SERPL CALC-SCNC: 2 MMOL/L (ref 0–18)
AST SERPL-CCNC: 17 U/L (ref 15–37)
BASOPHILS # BLD AUTO: 0.04 X10(3) UL (ref 0–0.2)
BASOPHILS NFR BLD AUTO: 0.4 %
BILIRUB SERPL-MCNC: 0.4 MG/DL (ref 0.1–2)
BUN BLD-MCNC: 19 MG/DL (ref 7–18)
CALCIUM BLD-MCNC: 9.2 MG/DL (ref 8.5–10.1)
CHLORIDE SERPL-SCNC: 110 MMOL/L (ref 98–112)
CO2 SERPL-SCNC: 27 MMOL/L (ref 21–32)
CREAT BLD-MCNC: 1 MG/DL
EOSINOPHIL # BLD AUTO: 0.25 X10(3) UL (ref 0–0.7)
EOSINOPHIL NFR BLD AUTO: 2.8 %
ERYTHROCYTE [DISTWIDTH] IN BLOOD BY AUTOMATED COUNT: 13.2 %
FASTING STATUS PATIENT QL REPORTED: NO
GFR SERPLBLD BASED ON 1.73 SQ M-ARVRAT: 92 ML/MIN/1.73M2 (ref 60–?)
GLOBULIN PLAS-MCNC: 2.8 G/DL (ref 2.8–4.4)
GLUCOSE BLD-MCNC: 101 MG/DL (ref 70–99)
HCT VFR BLD AUTO: 42.3 %
HGB BLD-MCNC: 14.2 G/DL
IMM GRANULOCYTES # BLD AUTO: 0.03 X10(3) UL (ref 0–1)
IMM GRANULOCYTES NFR BLD: 0.3 %
LIPASE SERPL-CCNC: 89 U/L (ref 73–393)
LYMPHOCYTES # BLD AUTO: 2.51 X10(3) UL (ref 1–4)
LYMPHOCYTES NFR BLD AUTO: 28 %
MCH RBC QN AUTO: 31.6 PG (ref 26–34)
MCHC RBC AUTO-ENTMCNC: 33.6 G/DL (ref 31–37)
MCV RBC AUTO: 94.2 FL
MONOCYTES # BLD AUTO: 0.6 X10(3) UL (ref 0.1–1)
MONOCYTES NFR BLD AUTO: 6.7 %
NEUTROPHILS # BLD AUTO: 5.52 X10 (3) UL (ref 1.5–7.7)
NEUTROPHILS # BLD AUTO: 5.52 X10(3) UL (ref 1.5–7.7)
NEUTROPHILS NFR BLD AUTO: 61.8 %
OSMOLALITY SERPL CALC.SUM OF ELEC: 290 MOSM/KG (ref 275–295)
PLATELET # BLD AUTO: 308 10(3)UL (ref 150–450)
POTASSIUM SERPL-SCNC: 4.4 MMOL/L (ref 3.5–5.1)
PROT SERPL-MCNC: 6.8 G/DL (ref 6.4–8.2)
RBC # BLD AUTO: 4.49 X10(6)UL
SODIUM SERPL-SCNC: 139 MMOL/L (ref 136–145)
WBC # BLD AUTO: 9 X10(3) UL (ref 4–11)

## 2022-09-15 PROCEDURE — 85025 COMPLETE CBC W/AUTO DIFF WBC: CPT

## 2022-09-15 PROCEDURE — 83690 ASSAY OF LIPASE: CPT

## 2022-09-15 PROCEDURE — 36415 COLL VENOUS BLD VENIPUNCTURE: CPT

## 2022-09-15 PROCEDURE — 80053 COMPREHEN METABOLIC PANEL: CPT

## 2022-09-19 ENCOUNTER — TELEMEDICINE (OUTPATIENT)
Dept: FAMILY MEDICINE CLINIC | Facility: CLINIC | Age: 47
End: 2022-09-19

## 2022-09-19 DIAGNOSIS — R10.13 EPIGASTRIC PAIN: Primary | ICD-10-CM

## 2022-10-24 ENCOUNTER — HOSPITAL ENCOUNTER (OUTPATIENT)
Dept: ULTRASOUND IMAGING | Age: 47
Discharge: HOME | End: 2022-10-24
Payer: COMMERCIAL

## 2022-10-24 DIAGNOSIS — R11.2 NAUSEA AND VOMITING, UNSPECIFIED VOMITING TYPE: ICD-10-CM

## 2022-10-24 DIAGNOSIS — R10.13 EPIGASTRIC PAIN: ICD-10-CM

## 2022-10-24 DIAGNOSIS — R11.0 NAUSEA: ICD-10-CM

## 2022-10-24 PROCEDURE — 76700 US EXAM ABDOM COMPLETE: CPT

## 2022-10-26 NOTE — PROGRESS NOTES
Brain, Your ultrasound of the abdomen showed a gallbladder polyp. This a new finding in your gallbladder compared to the previous ultrasound of the abdomen, I would like you to have a surgical consult with anybody on [facility] team. The phone number is [phone]. The liver cyst is a benign finding. Thanks, [employer] [facility] Can you send a letter  Brain, Your ultrasound of the abdomen showed a gallbladder polyp. This a new finding in your gallbladder compared to the previous ultrasound of the abdomen, I would like you to have a surgical consult with anybody on [facility] team. The phone number is [phone]. The liver cyst is a benign finding.  Thanks, [employer] [facility] Can you send a letter

## 2022-10-31 ENCOUNTER — OFFICE VISIT (OUTPATIENT)
Facility: LOCATION | Age: 47
End: 2022-10-31
Payer: COMMERCIAL

## 2022-10-31 VITALS — TEMPERATURE: 98 F | HEART RATE: 70 BPM

## 2022-10-31 DIAGNOSIS — R10.13 EPIGASTRIC PAIN: Primary | ICD-10-CM

## 2022-10-31 DIAGNOSIS — K82.4 GALLBLADDER POLYP: ICD-10-CM

## 2022-12-13 RX ORDER — CLONAZEPAM 0.5 MG/1
0.5 TABLET ORAL NIGHTLY PRN
Qty: 30 TABLET | Refills: 1 | Status: SHIPPED | OUTPATIENT
Start: 2022-12-13

## 2023-01-31 ENCOUNTER — OFFICE VISIT (OUTPATIENT)
Dept: FAMILY MEDICINE CLINIC | Facility: CLINIC | Age: 48
End: 2023-01-31
Payer: COMMERCIAL

## 2023-01-31 VITALS
HEART RATE: 76 BPM | SYSTOLIC BLOOD PRESSURE: 122 MMHG | HEIGHT: 73 IN | BODY MASS INDEX: 26.77 KG/M2 | WEIGHT: 202 LBS | DIASTOLIC BLOOD PRESSURE: 74 MMHG | RESPIRATION RATE: 16 BRPM | OXYGEN SATURATION: 98 % | TEMPERATURE: 98 F

## 2023-01-31 DIAGNOSIS — Z12.5 SCREENING FOR MALIGNANT NEOPLASM OF PROSTATE: ICD-10-CM

## 2023-01-31 DIAGNOSIS — R14.0 BLOATING: Primary | ICD-10-CM

## 2023-01-31 DIAGNOSIS — E78.5 DYSLIPIDEMIA: ICD-10-CM

## 2023-01-31 PROBLEM — R63.4 WEIGHT LOSS: Status: RESOLVED | Noted: 2018-01-18 | Resolved: 2023-01-31

## 2023-01-31 PROCEDURE — 3078F DIAST BP <80 MM HG: CPT | Performed by: FAMILY MEDICINE

## 2023-01-31 PROCEDURE — 99214 OFFICE O/P EST MOD 30 MIN: CPT | Performed by: FAMILY MEDICINE

## 2023-01-31 PROCEDURE — 3074F SYST BP LT 130 MM HG: CPT | Performed by: FAMILY MEDICINE

## 2023-01-31 PROCEDURE — 3008F BODY MASS INDEX DOCD: CPT | Performed by: FAMILY MEDICINE

## 2023-01-31 NOTE — PATIENT INSTRUCTIONS
Thank you for choosing Phuc Jasmine MD at [facility]  To Do: Ariana Edwardsy  1. Please see below  [facility] is located in [address]. Monday, Tuesday & Friday - 8 a.m. to 4 p.m. Wednesday, Thursday - 7 a.m. to 3 p.m. The lab is closed daily from 12 p.m.-12:30 p.m. Saturday lab hours by appointment. Call [phone] to schedule the appointment. Please signup for [employer], which is electronic access to your record if you have not done so. All your results will post on there. [email or web address]   You can NOW use [employer] to book your appointments with us, or consider using open access scheduling which is through the edward website [email or web address] and type in Phuc Jasmine MD and follow the links for \"Schedule Online Now\"    To schedule Imaging or tests at [facility] Scheduling [phone], Go to [place] A ER Building (For example: CT scans, X rays, Ultrasound, MRI)  Cardiac Testing in ER building Building A second floor Cardiac Testing [phone] (For example: Holter Monitor, Cardiac Stress tests,Event Monitor, or 2D Echocardiograms)  [facility] call [phone] usually in [facility] A  Walk in Clinic in [place] at [facility]. [address] Mon-Fri at 8am-7:30 p.m., and Sat/Sun 9:00a. m.-4:30 p.m. Also at [address] [employer]  Call [phone] for info     Please call our office about any questions regarding your treatment/medicines/tests as a result of today's visit. For your safety, read the entire package insert of all medicines prescribed to you and be aware of all of the risks of treatment even beyond those discussed today. All therapies have potential risk of harm or side effects or medication interactions.   It is your duty and for your safety to discuss with the pharmacist and our office with questions, and to notify us and stop treatment if problems arise, but know that our intention is that the benefits outweigh those potential risks and we strive to make you healthier and to improve your quality of life. Referrals, and Radiology Information:    If your insurance requires a referral to a specialist, please allow 5 business days to process your referral request.    If Reyna Campos MD orders a CT or MRI, it may take up to 10 business days to receive approval from your insurance company. Once our office has called informing you that the insurance company approved your testing, please call Central Scheduling at [phone]  Please allow our office 5 business days to contact you regarding any testing results. Refill policies:   Allow 3 business days for refills; controlled substances may take longer and must be picked up from the office in person. Narcotic medications can only be filled in 30 day increments and must be refilled at an office visit only. If your prescription is due for a refill, you may be due for a follow-up appointment. We cannot refill your maintenance medications at a preventative wellness visit. To best provide you care, patients receiving maintenance medications need to be seen at least twice a year.

## 2023-03-09 RX ORDER — CLONAZEPAM 0.5 MG/1
0.5 TABLET ORAL NIGHTLY PRN
Qty: 30 TABLET | Refills: 1 | Status: SHIPPED | OUTPATIENT
Start: 2023-03-09

## 2023-03-20 ENCOUNTER — LAB ENCOUNTER (OUTPATIENT)
Dept: LAB | Age: 48
End: 2023-03-20
Attending: FAMILY MEDICINE
Payer: COMMERCIAL

## 2023-03-20 DIAGNOSIS — Z12.5 SCREENING FOR MALIGNANT NEOPLASM OF PROSTATE: ICD-10-CM

## 2023-03-20 DIAGNOSIS — E78.5 DYSLIPIDEMIA: ICD-10-CM

## 2023-03-20 LAB
ALBUMIN SERPL-MCNC: 3.9 G/DL (ref 3.4–5)
ALBUMIN/GLOB SERPL: 1.3 {RATIO} (ref 1–2)
ALP LIVER SERPL-CCNC: 67 U/L
ALT SERPL-CCNC: 37 U/L
ANION GAP SERPL CALC-SCNC: 3 MMOL/L (ref 0–18)
AST SERPL-CCNC: 25 U/L (ref 15–37)
BASOPHILS # BLD AUTO: 0.06 X10(3) UL (ref 0–0.2)
BASOPHILS NFR BLD AUTO: 0.8 %
BILIRUB SERPL-MCNC: 0.3 MG/DL (ref 0.1–2)
BUN BLD-MCNC: 19 MG/DL (ref 7–18)
CALCIUM BLD-MCNC: 8.9 MG/DL (ref 8.5–10.1)
CHLORIDE SERPL-SCNC: 107 MMOL/L (ref 98–112)
CHOLEST SERPL-MCNC: 191 MG/DL (ref ?–200)
CO2 SERPL-SCNC: 29 MMOL/L (ref 21–32)
COMPLEXED PSA SERPL-MCNC: 0.64 NG/ML (ref ?–4)
CREAT BLD-MCNC: 1.04 MG/DL
EOSINOPHIL # BLD AUTO: 0.34 X10(3) UL (ref 0–0.7)
EOSINOPHIL NFR BLD AUTO: 4.5 %
ERYTHROCYTE [DISTWIDTH] IN BLOOD BY AUTOMATED COUNT: 12.8 %
FASTING PATIENT LIPID ANSWER: YES
FASTING STATUS PATIENT QL REPORTED: YES
GFR SERPLBLD BASED ON 1.73 SQ M-ARVRAT: 88 ML/MIN/1.73M2 (ref 60–?)
GLOBULIN PLAS-MCNC: 3 G/DL (ref 2.8–4.4)
GLUCOSE BLD-MCNC: 99 MG/DL (ref 70–99)
HCT VFR BLD AUTO: 44 %
HDLC SERPL-MCNC: 36 MG/DL (ref 40–59)
HGB BLD-MCNC: 14.8 G/DL
IMM GRANULOCYTES # BLD AUTO: 0.04 X10(3) UL (ref 0–1)
IMM GRANULOCYTES NFR BLD: 0.5 %
LDLC SERPL CALC-MCNC: 135 MG/DL (ref ?–100)
LYMPHOCYTES # BLD AUTO: 2.34 X10(3) UL (ref 1–4)
LYMPHOCYTES NFR BLD AUTO: 31 %
MCH RBC QN AUTO: 30.8 PG (ref 26–34)
MCHC RBC AUTO-ENTMCNC: 33.6 G/DL (ref 31–37)
MCV RBC AUTO: 91.7 FL
MONOCYTES # BLD AUTO: 0.55 X10(3) UL (ref 0.1–1)
MONOCYTES NFR BLD AUTO: 7.3 %
NEUTROPHILS # BLD AUTO: 4.21 X10 (3) UL (ref 1.5–7.7)
NEUTROPHILS # BLD AUTO: 4.21 X10(3) UL (ref 1.5–7.7)
NEUTROPHILS NFR BLD AUTO: 55.9 %
NONHDLC SERPL-MCNC: 155 MG/DL (ref ?–130)
OSMOLALITY SERPL CALC.SUM OF ELEC: 290 MOSM/KG (ref 275–295)
PLATELET # BLD AUTO: 289 10(3)UL (ref 150–450)
POTASSIUM SERPL-SCNC: 4.2 MMOL/L (ref 3.5–5.1)
PROT SERPL-MCNC: 6.9 G/DL (ref 6.4–8.2)
RBC # BLD AUTO: 4.8 X10(6)UL
SODIUM SERPL-SCNC: 139 MMOL/L (ref 136–145)
T4 FREE SERPL-MCNC: 0.8 NG/DL (ref 0.8–1.7)
TRIGL SERPL-MCNC: 110 MG/DL (ref 30–149)
TSI SER-ACNC: 1.47 MIU/ML (ref 0.36–3.74)
VLDLC SERPL CALC-MCNC: 20 MG/DL (ref 0–30)
WBC # BLD AUTO: 7.5 X10(3) UL (ref 4–11)

## 2023-03-20 PROCEDURE — 84443 ASSAY THYROID STIM HORMONE: CPT

## 2023-03-20 PROCEDURE — 84439 ASSAY OF FREE THYROXINE: CPT

## 2023-03-20 PROCEDURE — 36415 COLL VENOUS BLD VENIPUNCTURE: CPT

## 2023-03-20 PROCEDURE — 80061 LIPID PANEL: CPT

## 2023-03-20 PROCEDURE — 85025 COMPLETE CBC W/AUTO DIFF WBC: CPT

## 2023-03-20 PROCEDURE — 80053 COMPREHEN METABOLIC PANEL: CPT

## 2023-03-27 ENCOUNTER — OFFICE VISIT (OUTPATIENT)
Dept: FAMILY MEDICINE CLINIC | Facility: CLINIC | Age: 48
End: 2023-03-27
Payer: COMMERCIAL

## 2023-03-27 VITALS
SYSTOLIC BLOOD PRESSURE: 118 MMHG | DIASTOLIC BLOOD PRESSURE: 74 MMHG | RESPIRATION RATE: 16 BRPM | TEMPERATURE: 98 F | BODY MASS INDEX: 26.77 KG/M2 | WEIGHT: 202 LBS | HEIGHT: 73 IN | HEART RATE: 80 BPM | OXYGEN SATURATION: 99 %

## 2023-03-27 DIAGNOSIS — G89.29 CHRONIC RIGHT LOWER QUADRANT PAIN: Primary | ICD-10-CM

## 2023-03-27 DIAGNOSIS — R10.31 CHRONIC RIGHT LOWER QUADRANT PAIN: Primary | ICD-10-CM

## 2023-03-27 DIAGNOSIS — R14.0 BLOATING: ICD-10-CM

## 2023-03-27 PROCEDURE — 3008F BODY MASS INDEX DOCD: CPT | Performed by: FAMILY MEDICINE

## 2023-03-27 PROCEDURE — 99214 OFFICE O/P EST MOD 30 MIN: CPT | Performed by: FAMILY MEDICINE

## 2023-03-27 PROCEDURE — 3078F DIAST BP <80 MM HG: CPT | Performed by: FAMILY MEDICINE

## 2023-03-27 PROCEDURE — 3074F SYST BP LT 130 MM HG: CPT | Performed by: FAMILY MEDICINE

## 2023-03-27 NOTE — PATIENT INSTRUCTIONS
Thank you for choosing Jessica Lo MD at [place] [address]  To Do: Mary Szymanski  1. Please see below  [employer] is located in [address]. Monday, Tuesday & Friday - 8 a.m. to 4 p.m. Wednesday, Thursday - 7 a.m. to 3 p.m. The lab is closed daily from 12 p.m.-12:30 p.m. Saturday lab hours by appointment. Call [phone] to schedule the appointment. Please signup for [employer], which is electronic access to your record if you have not done so. All your results will post on there. [email or web address]   You can NOW use [employer] to book your appointments with us, or consider using open access scheduling which is through the edward website [email or web address] and type in Jessica Lo MD and follow the links for \"Schedule Online Now\"    To schedule Imaging or tests at [facility] Scheduling [phone], Go to [facility] A ER Building (For example: CT scans, X rays, Ultrasound, MRI)  Cardiac Testing in ER building Building A second floor Cardiac Testing [phone] (For example: Holter Monitor, Cardiac Stress tests,Event Monitor, or 2D Echocardiograms)  [facility] call [phone] usually in dg A  Walk in Clinic in [place] at [facility]. [address] Mon-Fri at 8am-7:30 p.m., and Sat/Sun 9:00a. m.-4:30 p.m. Also at [address]  Call [phone] for info     Please call our office about any questions regarding your treatment/medicines/tests as a result of today's visit. For your safety, read the entire package insert of all medicines prescribed to you and be aware of all of the risks of treatment even beyond those discussed today. All therapies have potential risk of harm or side effects or medication interactions.   It is your duty and for your safety to discuss with the pharmacist and our office with questions, and to notify us and stop treatment if problems arise, but know that our intention is that the benefits outweigh those potential risks and we strive to make you healthier and to improve your quality of life. Referrals, and Radiology Information:    If your insurance requires a referral to a specialist, please allow 5 business days to process your referral request.    If Ronald France MD orders a CT or MRI, it may take up to 10 business days to receive approval from your insurance company. Once our office has called informing you that the insurance company approved your testing, please call Central Scheduling at [phone]  Please allow our office 5 business days to contact you regarding any testing results. Refill policies:   Allow 3 business days for refills; controlled substances may take longer and must be picked up from the office in person. Narcotic medications can only be filled in 30 day increments and must be refilled at an office visit only. If your prescription is due for a refill, you may be due for a follow-up appointment. We cannot refill your maintenance medications at a preventative wellness visit. To best provide you care, patients receiving maintenance medications need to be seen at least twice a year.

## 2023-03-30 ENCOUNTER — TELEPHONE (OUTPATIENT)
Dept: PHYSICAL THERAPY | Facility: HOSPITAL | Age: 48
End: 2023-03-30

## 2023-04-04 ENCOUNTER — OFFICE VISIT (OUTPATIENT)
Dept: PHYSICAL MEDICINE AND REHAB | Facility: CLINIC | Age: 48
End: 2023-04-04
Payer: COMMERCIAL

## 2023-04-04 VITALS
SYSTOLIC BLOOD PRESSURE: 114 MMHG | DIASTOLIC BLOOD PRESSURE: 74 MMHG | BODY MASS INDEX: 26.77 KG/M2 | WEIGHT: 202 LBS | HEIGHT: 73 IN

## 2023-04-04 DIAGNOSIS — R10.31 RIGHT GROIN PAIN: Primary | ICD-10-CM

## 2023-04-04 DIAGNOSIS — M54.50 LOW BACK PAIN RADIATING TO RIGHT LOWER EXTREMITY: ICD-10-CM

## 2023-04-04 DIAGNOSIS — M79.604 LOW BACK PAIN RADIATING TO RIGHT LOWER EXTREMITY: ICD-10-CM

## 2023-04-04 PROCEDURE — 3074F SYST BP LT 130 MM HG: CPT | Performed by: PHYSICAL MEDICINE & REHABILITATION

## 2023-04-04 PROCEDURE — 3008F BODY MASS INDEX DOCD: CPT | Performed by: PHYSICAL MEDICINE & REHABILITATION

## 2023-04-04 PROCEDURE — 99204 OFFICE O/P NEW MOD 45 MIN: CPT | Performed by: PHYSICAL MEDICINE & REHABILITATION

## 2023-04-04 PROCEDURE — 3078F DIAST BP <80 MM HG: CPT | Performed by: PHYSICAL MEDICINE & REHABILITATION

## 2023-04-04 RX ORDER — MELOXICAM 15 MG/1
TABLET ORAL
Qty: 15 TABLET | Refills: 0 | Status: SHIPPED | OUTPATIENT
Start: 2023-04-04

## 2023-04-05 ENCOUNTER — TELEPHONE (OUTPATIENT)
Dept: PHYSICAL THERAPY | Facility: HOSPITAL | Age: 48
End: 2023-04-05

## 2023-04-06 ENCOUNTER — HOSPITAL ENCOUNTER (OUTPATIENT)
Dept: GENERAL RADIOLOGY | Age: 48
Discharge: HOME | End: 2023-04-06
Attending: PHYSICAL MEDICINE & REHABILITATION
Payer: COMMERCIAL

## 2023-04-06 DIAGNOSIS — R10.31 RIGHT GROIN PAIN: ICD-10-CM

## 2023-04-06 DIAGNOSIS — M79.604 LOW BACK PAIN RADIATING TO RIGHT LOWER EXTREMITY: ICD-10-CM

## 2023-04-06 DIAGNOSIS — M54.50 LOW BACK PAIN RADIATING TO RIGHT LOWER EXTREMITY: ICD-10-CM

## 2023-04-06 PROCEDURE — 73502 X-RAY EXAM HIP UNI 2-3 VIEWS: CPT | Performed by: PHYSICAL MEDICINE & REHABILITATION

## 2023-04-06 PROCEDURE — 72110 X-RAY EXAM L-2 SPINE 4/>VWS: CPT | Performed by: PHYSICAL MEDICINE & REHABILITATION

## 2023-04-11 ENCOUNTER — OFFICE VISIT (OUTPATIENT)
Dept: PHYSICAL THERAPY | Age: 48
End: 2023-04-11
Attending: FAMILY MEDICINE
Payer: COMMERCIAL

## 2023-04-11 DIAGNOSIS — G89.29 CHRONIC RIGHT LOWER QUADRANT PAIN: ICD-10-CM

## 2023-04-11 DIAGNOSIS — R10.31 CHRONIC RIGHT LOWER QUADRANT PAIN: ICD-10-CM

## 2023-04-11 PROCEDURE — 97110 THERAPEUTIC EXERCISES: CPT | Performed by: PHYSICAL THERAPIST

## 2023-04-11 PROCEDURE — 97161 PT EVAL LOW COMPLEX 20 MIN: CPT | Performed by: PHYSICAL THERAPIST

## 2023-04-17 ENCOUNTER — APPOINTMENT (OUTPATIENT)
Dept: PHYSICAL THERAPY | Age: 48
End: 2023-04-17
Attending: FAMILY MEDICINE
Payer: COMMERCIAL

## 2023-04-17 ENCOUNTER — TELEPHONE (OUTPATIENT)
Dept: PHYSICAL THERAPY | Facility: HOSPITAL | Age: 48
End: 2023-04-17

## 2023-04-17 ENCOUNTER — OFFICE VISIT (OUTPATIENT)
Dept: PHYSICAL THERAPY | Age: 48
End: 2023-04-17
Attending: FAMILY MEDICINE
Payer: COMMERCIAL

## 2023-04-17 PROCEDURE — 97110 THERAPEUTIC EXERCISES: CPT | Performed by: PHYSICAL THERAPIST

## 2023-04-17 NOTE — PROGRESS NOTES
Dx: Chronic right lower quadrant pain (R10.31,G89.29)             Authorized # of Visits:  8  Fall Risk: standard         Precautions: n/a             Subjective:     Current Pain Ratin/10  Objective:   Mild right side lumbar pain with LTR - contract/relax eliminated pain   Right hip decreased control posterior lunge right LE loaded  Decreased eccentric control right hip flexors - bracing through UE - eliminated improved control     Assessment:   Patient tolerated the session well. Next session will include body mechanics training, assessment of response to spinal loading     Plan:   PT 2x/wk progressive exercise as tolerated     Date: 2023  Tx#: 2 Date: Tx#: 3/ Date: Tx#: 4/ Date: Tx#: 5/ Date: Tx#: 6/ Date: Tx#: 7/ Date: Tx#: 8/   TherEx TherEx TherEx TherEx TherEx TherEx TherEx   Treadmill 10 minutes          High knees - right hip decreased control eccentrics, braiding/hamstrings - good          TRX squats with hip hinge and flexion > 8 reps - good         TRX posterior lunge 10 reps each - decreased control with right LE loaded          Quad stretch 3 reps x 30 seconds each          LTR - initial reps right side pain moving left - contract/relax eliminated pain          SL thoracic mobility transverse plane 10 reps each          Review of gastroc/soleus stretch                       Charges:  TherEx 3        Total Timed Treatment: 45 min  Total Treatment Time: 45 min

## 2023-04-19 ENCOUNTER — TELEPHONE (OUTPATIENT)
Dept: PHYSICAL THERAPY | Facility: HOSPITAL | Age: 48
End: 2023-04-19

## 2023-04-19 ENCOUNTER — APPOINTMENT (OUTPATIENT)
Dept: PHYSICAL THERAPY | Age: 48
End: 2023-04-19
Attending: FAMILY MEDICINE
Payer: COMMERCIAL

## 2023-04-19 ENCOUNTER — OFFICE VISIT (OUTPATIENT)
Dept: PHYSICAL THERAPY | Age: 48
End: 2023-04-19
Attending: FAMILY MEDICINE
Payer: COMMERCIAL

## 2023-04-19 PROCEDURE — 97110 THERAPEUTIC EXERCISES: CPT | Performed by: PHYSICAL THERAPIST

## 2023-04-19 NOTE — PROGRESS NOTES
Dx: Chronic right lower quadrant pain (R10.31,G89.29)             Authorized # of Visits:  8  Fall Risk: standard         Precautions: n/a             Subjective:     Current Pain Ratin/10  Objective:   Treatment session included hip hinge training   10 pound medicine ball step matrix - no pain     Assessment:   Patient tolerated all exercise well. He was able to perform hip hinge properly following verbal and tactile cues. Additional spinal loading via medicine ball training - no pain     Plan:   PT 2x/wk progressive exercise as tolerated     Date: 2023  Tx#:  Date: 2023  Tx#: 3/8 Date: Tx#: 4/ Date: Tx#: 5/ Date: Tx#: 6/ Date: Tx#: 7/ Date: Tx#: 8/   TherEx TherEx TherEx TherEx TherEx TherEx TherEx   Treadmill 10 minutes  Elliptical 6 minutes         High knees - right hip decreased control eccentrics, braiding/hamstrings - good  Braiding, high knees, frontal plane mobility/ant/post with bilat UE dirvers 10 reps each         TRX squats with hip hinge and flexion > 8 reps - good Hip hinge, single leg balance hip hinge         TRX posterior lunge 10 reps each - decreased control with right LE loaded  Hamstring mobility on step with hip hinge and forward bending         Quad stretch 3 reps x 30 seconds each  10 pound medicine ball step matrix forward/side, ant/lat  > 15 reps         LTR - initial reps right side pain moving left - contract/relax eliminated pain  10 pound medicine ball chop in split stance         SL thoracic mobility transverse plane 10 reps each  SL thoracic mobility 12 reps each         Review of gastroc/soleus stretch  Knees to chest with SB 20 reps          One arm press superband > 40 feet x 2 right and left UE             Charges:  TherEx 3        Total Timed Treatment: 45 min  Total Treatment Time: 45 min

## 2023-04-20 ENCOUNTER — TELEPHONE (OUTPATIENT)
Dept: PHYSICAL THERAPY | Facility: HOSPITAL | Age: 48
End: 2023-04-20

## 2023-04-25 ENCOUNTER — OFFICE VISIT (OUTPATIENT)
Dept: PHYSICAL THERAPY | Age: 48
End: 2023-04-25
Attending: FAMILY MEDICINE
Payer: COMMERCIAL

## 2023-04-25 PROCEDURE — 97110 THERAPEUTIC EXERCISES: CPT | Performed by: PHYSICAL THERAPIST

## 2023-04-25 RX ORDER — CLONAZEPAM 0.5 MG/1
0.5 TABLET ORAL NIGHTLY PRN
Qty: 30 TABLET | Refills: 1 | Status: SHIPPED | OUTPATIENT
Start: 2023-04-25

## 2023-04-25 NOTE — PROGRESS NOTES
Dx: Chronic right lower quadrant pain (R10.31,G89.29)             Authorized # of Visits:  8  Fall Risk: standard         Precautions: n/a             Subjective:   Patient states he is doing pretty good overall. Some pain at front of right hip and pelvis at times  Current Pain Ratin/10  Objective:   Jaison stretch - anterior right hip pain with right hip flexion. No stretch noted with right hip in extension  20 pound suitcase, mild increase right side pain with left side carry. Assessment:   Patient is reporting improvement. He tolerated exercise well overall. Advised he avoid excessive hip flexion/trunk curl type exercises. Plan:   PT 2x/wk progressive exercise as tolerated     Date: 2023  Tx#:  Date: 2023  Tx#: 3/8 Date: 2023  Tx#:  Date: Tx#: 5/ Date: Tx#: 6/ Date: Tx#: 7/ Date: Tx#: 8/   TherEx TherEx TherEx TherEx TherEx TherEx TherEx   Treadmill 10 minutes  Elliptical 6 minutes  Elliptical 6 minutes        High knees - right hip decreased control eccentrics, braiding/hamstrings - good  Braiding, high knees, frontal plane mobility/ant/post with bilat UE dirvers 10 reps each  High knees, hamstrings, single leg balance opposite leg swings 10 reps x 2 each        TRX squats with hip hinge and flexion > 8 reps - good Hip hinge, single leg balance hip hinge  1/2 kneel hip flexor stretch, thoracic mobility transverse plane 10 reps each        TRX posterior lunge 10 reps each - decreased control with right LE loaded  Hamstring mobility on step with hip hinge and forward bending  1/2 kneel chop 10 pounds 15 reps each        Quad stretch 3 reps x 30 seconds each  10 pound medicine ball step matrix forward/side, ant/lat  > 15 reps  Jaison stretch 30 seconds x 3 each.  Right anterior hip pain with end range hip flexion        LTR - initial reps right side pain moving left - contract/relax eliminated pain  10 pound medicine ball chop in split stance  Single leg balance hip hinge x 5 reps each        SL thoracic mobility transverse plane 10 reps each  SL thoracic mobility 12 reps each  20 pound suitcase carry - mild right hip pain with left side carry        Review of gastroc/soleus stretch  Knees to chest with SB 20 reps  Medicordz forward/side/back          One arm press superband > 40 feet x 2 right and left UE             Charges:  TherEx 3        Total Timed Treatment: 45 min  Total Treatment Time: 45 min

## 2023-04-27 ENCOUNTER — TELEPHONE (OUTPATIENT)
Dept: PHYSICAL THERAPY | Facility: HOSPITAL | Age: 48
End: 2023-04-27

## 2023-05-01 ENCOUNTER — TELEPHONE (OUTPATIENT)
Dept: PHYSICAL THERAPY | Facility: HOSPITAL | Age: 48
End: 2023-05-01

## 2023-05-02 ENCOUNTER — APPOINTMENT (OUTPATIENT)
Dept: PHYSICAL THERAPY | Age: 48
End: 2023-05-02
Attending: FAMILY MEDICINE
Payer: COMMERCIAL

## 2023-05-04 ENCOUNTER — APPOINTMENT (OUTPATIENT)
Dept: PHYSICAL THERAPY | Age: 48
End: 2023-05-04
Attending: FAMILY MEDICINE
Payer: COMMERCIAL

## 2023-05-09 ENCOUNTER — OFFICE VISIT (OUTPATIENT)
Dept: PHYSICAL THERAPY | Age: 48
End: 2023-05-09
Attending: FAMILY MEDICINE
Payer: COMMERCIAL

## 2023-05-09 PROCEDURE — 97110 THERAPEUTIC EXERCISES: CPT | Performed by: PHYSICAL THERAPIST

## 2023-05-09 NOTE — PROGRESS NOTES
Dx: Chronic right lower quadrant pain (R10.31,G89.29)             Authorized # of Visits:  8  Fall Risk: standard         Precautions: n/a             Subjective:   Patient states he is doing much better. Current Pain Ratin/10  Objective:   Treatment as noted       Assessment:   No pain. Dicussed progress with patient. Dicussed progress with patient. He would like to discontinue exercises to perform at the gym post discharge. He appears ready for discharge    Plan:   PT 1-2 more visits     Date: 2023  Tx#:  Date: 2023  Tx#: 3/8 Date: 2023  Tx#:  Date: 2023  Tx#:  Date: Tx#: / Date: Tx#: / Date: Tx#: 8/   TherEx TherEx TherEx TherEx TherEx TherEx TherEx   Treadmill 10 minutes  Elliptical 6 minutes  Elliptical 6 minutes  Elliptical 6 minutes       High knees - right hip decreased control eccentrics, braiding/hamstrings - good  Braiding, high knees, frontal plane mobility/ant/post with bilat UE dirvers 10 reps each  High knees, hamstrings, single leg balance opposite leg swings 10 reps x 2 each  Closed chain hip IR bilat UE , frontal plane mobility bilat UE  10 reps each       TRX squats with hip hinge and flexion > 8 reps - good Hip hinge, single leg balance hip hinge  1/2 kneel hip flexor stretch, thoracic mobility transverse plane 10 reps each  Forward step with bilateral reach to mid tibia level 10 reps each      TRX posterior lunge 10 reps each - decreased control with right LE loaded  Hamstring mobility on step with hip hinge and forward bending  1/2 kneel chop 10 pounds 15 reps each  Lateral step 8 inch step 15 reps each       Quad stretch 3 reps x 30 seconds each  10 pound medicine ball step matrix forward/side, ant/lat  > 15 reps  Jaison stretch 30 seconds x 3 each.  Right anterior hip pain with end range hip flexion  TRX squats 20 reps       LTR - initial reps right side pain moving left - contract/relax eliminated pain  10 pound medicine ball chop in split stance  Single leg balance hip hinge x 5 reps each  Chop 72 pounds with cable column 15 reps each       SL thoracic mobility transverse plane 10 reps each  SL thoracic mobility 12 reps each  20 pound suitcase carry - mild right hip pain with left side carry  TRX retraction 10 reps x 4       Review of gastroc/soleus stretch  Knees to chest with SB 20 reps  Medicordz forward/side/back   Single leg balance with opposite leg swings 10 reps x 3 each        One arm press superband > 40 feet x 2 right and left UE   Single leg balance with overhead lift 8 pounds 10 reps x 2 each          Knees to chest with SB 30 reps each         Hamstring stretch 3 reps x 20 seconds each                             Charges:  TherEx 3        Total Timed Treatment: 45 min  Total Treatment Time: 45 min Admission

## 2023-06-08 RX ORDER — CLONAZEPAM 0.5 MG/1
0.5 TABLET ORAL NIGHTLY PRN
Qty: 30 TABLET | Refills: 1 | Status: SHIPPED | OUTPATIENT
Start: 2023-06-08

## 2023-08-16 ENCOUNTER — TELEPHONE (OUTPATIENT)
Dept: FAMILY MEDICINE CLINIC | Facility: CLINIC | Age: 48
End: 2023-08-16

## 2023-08-16 ENCOUNTER — TELEMEDICINE (OUTPATIENT)
Dept: FAMILY MEDICINE CLINIC | Facility: CLINIC | Age: 48
End: 2023-08-16

## 2023-08-16 DIAGNOSIS — E78.5 DYSLIPIDEMIA: Primary | ICD-10-CM

## 2023-08-16 DIAGNOSIS — E66.9 OBESITY (BMI 30-39.9): ICD-10-CM

## 2023-08-16 DIAGNOSIS — F51.01 PRIMARY INSOMNIA: ICD-10-CM

## 2023-08-16 DIAGNOSIS — N48.30 PAINFUL PENILE ERECTION: Primary | ICD-10-CM

## 2023-08-16 DIAGNOSIS — K82.4 GALLBLADDER POLYP: ICD-10-CM

## 2023-08-16 DIAGNOSIS — R73.9 HYPERGLYCEMIA: ICD-10-CM

## 2023-08-16 PROCEDURE — 99214 OFFICE O/P EST MOD 30 MIN: CPT | Performed by: FAMILY MEDICINE

## 2023-08-16 RX ORDER — CLONAZEPAM 1 MG/1
1 TABLET ORAL NIGHTLY PRN
Qty: 30 TABLET | Refills: 1 | Status: SHIPPED | OUTPATIENT
Start: 2023-08-16

## 2023-08-16 NOTE — TELEPHONE ENCOUNTER
Pt asking if there are any preventative cardiac tests that Dr. Gianluca Ojeda would recommend he have done. Pt is concerned about heart issues. Please advise.

## 2023-08-16 NOTE — PROGRESS NOTES
Subjective:   Patient ID: Ary Barrera is a 46year old male. HPI  Mr. Lashanda Frazier is a very pleasant 53-year-old gentleman presenting for video visit today. He has had long standing insomnia secondary to anxiety and has been on clonazepam 0.5 mg 1 tablet at nighttime which has been effective for the past several years up until recently when he needed to increase his dose. He also reports that over the past several weeks he has had painful erections. He would note that the tip of his penis would be bent. This seem to come and go. He does not notice any lesions nor discharge. No changes in bladder habits. No blood in the urine. Last year he had an ultrasound done of the abdomen which showed mild hepatic steatosis and gallbladder polyps. He was advised to have another ultrasound to check for stability. He does not report fever, cough, chest pain, nausea, vomiting, abdominal pain. I had reviewed past medical and family histories together with allergy and medication lists documented. History/Other:   Review of Systems   Constitutional:  Negative for fatigue and fever. Respiratory:  Negative for cough and shortness of breath. Cardiovascular:  Negative for chest pain. Gastrointestinal:  Negative for nausea and vomiting. Genitourinary:  Negative for difficulty urinating. Neurological:  Negative for dizziness. Current Outpatient Medications   Medication Sig Dispense Refill    clonazePAM 1 MG Oral Tab Take 1 tablet (1 mg total) by mouth nightly as needed for Anxiety. 30 tablet 1    clonazePAM 0.5 MG Oral Tab Take 1 tablet (0.5 mg total) by mouth nightly as needed for Anxiety. AT BEDTIME 30 tablet 1    Meloxicam 15 MG Oral Tab 1 tab PO qDaily x7 days, then 1 tab PO qDaily PRN Pain. Take with food. 15 tablet 0    Zinc 100 MG Oral Tab Take 1 tablet by mouth daily.       FLUTICASONE PROPIONATE 50 MCG/ACT Nasal Suspension SPRAY 2 SPRAYS INTO EACH NOSTRIL EVERY DAY 48 g 1    Ergocalciferol (VITAMIN D OR) Take 1 tablet by mouth daily. Ascorbic Acid (VITAMIN C OR) Take 1 tablet by mouth daily. Multiple Vitamin (MULTI-VITAMIN DAILY) Oral Tab Take 1 tablet by mouth daily. Allergies:  Augmentin [Amoclan]     NAUSEA AND VOMITING    Comment:VOMIT  Amoxicillin-Pot Cla*    NAUSEA ONLY  Dust Mite Extract       ITCHING    Comment:Eyes. Stuffy nose    Objective:   Physical Exam  Constitutional:       General: He is not in acute distress. Neurological:      Mental Status: He is alert. Psychiatric:         Mood and Affect: Mood normal.         Behavior: Behavior normal.         Assessment & Plan:   Painful penile erection  (primary encounter diagnosis)  -Possibly due to Peyronie's  -I will refer to urology for further evaluation management  Gallbladder polyp  -Asymptomatic but will order abdominal ultrasound to check for stability  Primary insomnia  -We will increase clonazepam to 1 mg 1 tablet at nighttime    I did ask him to give us an update in a month or so how he is doing with his increased dose of clonazepam and sleep. This note was prepared using [employer] voice recognition dictation software. As a result errors may occur. When identified these errors have been corrected. While every attempt is made to correct errors during dictation discrepancies may still exist          No orders of the defined types were placed in this encounter. Meds This Visit:  Requested Prescriptions     Signed Prescriptions Disp Refills    clonazePAM 1 MG Oral Tab 30 tablet 1     Sig: Take 1 tablet (1 mg total) by mouth nightly as needed for Anxiety.        Imaging & Referrals:  UROLOGY - INTERNAL  US ABDOMEN COMPLETE (CPT=76700)

## 2023-08-20 ENCOUNTER — HOSPITAL ENCOUNTER (OUTPATIENT)
Dept: CT IMAGING | Age: 48
Discharge: HOME | End: 2023-08-20
Attending: FAMILY MEDICINE

## 2023-08-20 DIAGNOSIS — E78.5 DYSLIPIDEMIA: ICD-10-CM

## 2023-08-20 DIAGNOSIS — E66.9 OBESITY (BMI 30-39.9): ICD-10-CM

## 2023-08-20 DIAGNOSIS — R73.9 HYPERGLYCEMIA: ICD-10-CM

## 2023-08-20 NOTE — PROGRESS NOTES
Imaging report reviewed and shows no concerning findings.  Please notify patient.    -Dr. Moraima Sibley

## 2023-09-11 ENCOUNTER — OFFICE VISIT (OUTPATIENT)
Dept: SURGERY | Facility: CLINIC | Age: 48
End: 2023-09-11

## 2023-09-11 DIAGNOSIS — N48.6 PEYRONIE'S DISEASE: ICD-10-CM

## 2023-09-11 DIAGNOSIS — R82.90 URINE FINDING: Primary | ICD-10-CM

## 2023-09-11 LAB
APPEARANCE: CLEAR
BILIRUBIN: NEGATIVE
GLUCOSE (URINE DIPSTICK): NEGATIVE MG/DL
KETONES (URINE DIPSTICK): NEGATIVE MG/DL
LEUKOCYTES: NEGATIVE
MULTISTIX LOT#: NORMAL NUMERIC
NITRITE, URINE: NEGATIVE
OCCULT BLOOD: NEGATIVE
PH, URINE: 6 (ref 4.5–8)
PROTEIN (URINE DIPSTICK): NEGATIVE MG/DL
SPECIFIC GRAVITY: 1.01 (ref 1–1.03)
URINE-COLOR: YELLOW
UROBILINOGEN,SEMI-QN: 0.2 MG/DL (ref 0–1.9)

## 2023-09-11 PROCEDURE — 99243 OFF/OP CNSLTJ NEW/EST LOW 30: CPT | Performed by: UROLOGY

## 2023-09-11 PROCEDURE — 81003 URINALYSIS AUTO W/O SCOPE: CPT | Performed by: UROLOGY

## 2023-09-11 NOTE — PROGRESS NOTES
Urology Clinic Note - New Patient    Referring Provider:  Vandana Jay MD  [facility]  [address] [place] [address]     Primary Care Provider:  Vandana Jay MD     Chief Complaint:   Painful erections      HPI:   Froilan Miranda is a 46year old male with history of anxiety, GERD referred for painful erections. Patient reports initially feeling a slight bulge at the distal aspect of his penis about 1-2 months ago. This resolved, but he then noticed some pain with erections and a slight bend to the penis about 2 weeks ago. The bend is to the left/dorsal but he is unsure of the degree. The pain in the penis has improved but he still has some degree of this. He has not tried medication for the pain. No trauma recently; does say he had some painful intercourse in the past but never with immediate detumescence; does believe that he felled a \"pop\" at some point.  with 1 child. No erectile dysfunction and is still able to have intercourse. No voiding complaints. No previous urologic history. Does not have a history of Dupuytren's contracture.      UA NEGATIVE     PSA:  Lab Results   Component Value Date    PSAS 0.64 03/20/2023    PSAS 0.62 07/09/2019        History:     Past Medical History:   Diagnosis Date    Abdominal hernia     Had surgery back in 2017 I believe    Abdominal pain Off and on for a few months    Anxiety 11/06/2012    Arthritis of right knee 08/24/2017    Belching     Bloating     Chest pain     Decorative tattoo     Deviated septum 08/02/2013    Chronic sinus     Dyslipidemia 09/06/2013    Eczema 04/25/2013    Flatulence/gas pain/belching     GERD (gastroesophageal reflux disease) EGD 8/19 11/06/2012    Headache disorder     Dealt with migraines since I was a child    Heartburn     Diet dependant    Irregular bowel habits     BERTO (obstructive sleep apnea) 12/08/2012    Severe on cpap     Primary insomnia 08/02/2017    Right inguinal hernia 08/24/2017    Fat hernia Unspecified internal derangement of knee 10/17/2013    R      Vitamin D deficiency 05/05/2017    Vomiting     Wears glasses        Past Surgical History:   Procedure Laterality Date    HERNIA SURGERY Right 09/14/2017    OTHER      JAW WIRED SHUT  [address]    [facility]    TONSILLECTOMY  2014    UPPER GI ENDOSCOPY PERFORMED  8/22/19= Hiatal hernia   Vasectomy; 4/5 years ago    Family History   Problem Relation Age of Onset    Cancer Sister         breast cancer    Cancer Paternal Grandmother         lung cancer    Breast Cancer Sister        Social History     Socioeconomic History    Marital status:    Tobacco Use    Smoking status: Former     Packs/day: 1.00     Years: 10.00     Pack years: 10.00     Types: Cigarettes     Quit date: 8/1/2013     Years since quitting: 10.1    Smokeless tobacco: Never   Substance and Sexual Activity    Alcohol use: Not Currently     Comment: Infrequently    Drug use: Not Currently     Types: Cannabis       Medications (Active prior to today's visit):  Current Outpatient Medications   Medication Sig Dispense Refill    clonazePAM 1 MG Oral Tab Take 1 tablet (1 mg total) by mouth nightly as needed for Anxiety. 30 tablet 1    clonazePAM 0.5 MG Oral Tab Take 1 tablet (0.5 mg total) by mouth nightly as needed for Anxiety. AT BEDTIME 30 tablet 1    Meloxicam 15 MG Oral Tab 1 tab PO qDaily x7 days, then 1 tab PO qDaily PRN Pain. Take with food. 15 tablet 0    Zinc 100 MG Oral Tab Take 1 tablet by mouth daily. FLUTICASONE PROPIONATE 50 MCG/ACT Nasal Suspension SPRAY 2 SPRAYS INTO EACH NOSTRIL EVERY DAY 48 g 1    Ergocalciferol (VITAMIN D OR) Take 1 tablet by mouth daily. Ascorbic Acid (VITAMIN C OR) Take 1 tablet by mouth daily. Multiple Vitamin (MULTI-VITAMIN DAILY) Oral Tab Take 1 tablet by mouth daily.          Allergies:    Augmentin [Amoclan]     NAUSEA AND VOMITING    Comment:VOMIT  Amoxicillin-Pot Cla*    NAUSEA ONLY  Dust Mite Extract ITCHING    Comment:Eyes. Stuffy nose      Review of Systems:   A comprehensive 10-point review of systems was completed. Pertinent positives and negatives are noted in the the HPI. Physical Exam:   CONSTITUTIONAL: Well developed, well nourished, in no acute distress  NEUROLOGIC: Alert and oriented  HEAD: Normocephalic, atraumatic  EYES: Sclera non-icteric  ENT: Hearing intact, moist mucous membranes  NECK: No obvious goiter or masses  RESPIRATORY: Normal respiratory effort  SKIN: No evident rashes  ABDOMEN: Soft, non-tender, non-distended,   GENITOURINARY: Penis with ~1cm area of scar tissue along dorsal/left aspect of penis about 1/3 distance from glans; when stretched a slight 10-20 degree curvature towards the plaque appreciated  Orthotopic meatus, normal bilateral testicles      Assessment & Plan:   Sobeida Back is a 46year old male with history of anxiety, GERD referred for painful erections. - Patient with clinical peyronie's disease; likely from previous trauma during intercourse. Given onset was a few weeks ago and pain persists he is likely in acute phase of the process. A small plaque can be palpated along the area of curvature but this is not well demarcated at this time. - We discussed etiology of peyronie's disease (PD) which is thought to be scar formation in the tunica albuginea of the corpora cavernosa which can lead to penile deformity and pain. This is sometimes from repeated minor penile trauma. This eventually leads to plaque formation. We discussed that the acute phase of this process is characterized by inflammation and remodeling which typically presents with penile deformity, plaque formation and pain. This usually last 6-18 months. The chronic phase is once stabilization of the plaque and deformity is noted. Pain is typically either stable at this time or resolved (most of the time it resolves). Calcification in the plaque indicates chronic phase.    At 18 months after the onset of disease; <15% curvature resolves, 20-50% stabilize, and 40-60% of men have progression. Pain resolves in 90% of men at 12 months of onset. - We then discussed general concepts of treatment including the recommendation of surveillance for men who are not bothered by PD. We also discussed that during the acute phase treatment is typically with NSAIDs for pain relief. After this, treatment is based on sexual function. If deformity does not interfere with intercourse, then therapy is unnecessary. If it does limit sexual function then PDE5Is may be given. - For chronic phase of PD, we discussed treatments briefly including traction therapies, intralesional pleque injections (for patients with palpable plaque with 30-90 degrees curvature without ED), and finally surgical treatment (plication, incision/grafting, IPP). - We discussed that if an intervention is planned then an in-office inter cavernosal injection may be utilized to properly assess degree of curvature. Patient will begin NSAIDs for pain relief before sexual intercourse. He will take a picture to bring to clinic that demonstrates the curvature. He will return in about 6 months and if he continues to have symptoms at that time we will consider artificial erection to properly demarcate plaque followed by Xiaflex therapy. Briefly discussed risks of treatment today. Thank you for this consult. I have personally reviewed all relevant medical records, labs, and imaging. Yoan Ortiz MD  Staff Urologist  [facility]  Office: [phone]           In total, 35 minutes were spent on this patient encounter (including chart review, patient history, physical, and counseling, documentation, and communication).

## 2023-09-22 ENCOUNTER — HOSPITAL ENCOUNTER (OUTPATIENT)
Dept: ULTRASOUND IMAGING | Age: 48
Discharge: HOME | End: 2023-09-22
Attending: FAMILY MEDICINE
Payer: COMMERCIAL

## 2023-09-22 DIAGNOSIS — K82.4 GALLBLADDER POLYP: ICD-10-CM

## 2023-09-22 PROCEDURE — 76700 US EXAM ABDOM COMPLETE: CPT | Performed by: FAMILY MEDICINE

## 2023-09-26 ENCOUNTER — OFFICE VISIT (OUTPATIENT)
Dept: FAMILY MEDICINE CLINIC | Facility: CLINIC | Age: 48
End: 2023-09-26
Payer: COMMERCIAL

## 2023-09-26 VITALS
DIASTOLIC BLOOD PRESSURE: 74 MMHG | BODY MASS INDEX: 26.11 KG/M2 | OXYGEN SATURATION: 98 % | HEIGHT: 73 IN | TEMPERATURE: 98 F | SYSTOLIC BLOOD PRESSURE: 122 MMHG | HEART RATE: 76 BPM | WEIGHT: 197 LBS | RESPIRATION RATE: 16 BRPM

## 2023-09-26 DIAGNOSIS — N28.89 NODULE OF KIDNEY: Primary | ICD-10-CM

## 2023-09-26 DIAGNOSIS — H92.02 LEFT EAR PAIN: ICD-10-CM

## 2023-09-26 PROCEDURE — 99214 OFFICE O/P EST MOD 30 MIN: CPT | Performed by: FAMILY MEDICINE

## 2023-09-26 PROCEDURE — 3078F DIAST BP <80 MM HG: CPT | Performed by: FAMILY MEDICINE

## 2023-09-26 PROCEDURE — 3008F BODY MASS INDEX DOCD: CPT | Performed by: FAMILY MEDICINE

## 2023-09-26 PROCEDURE — 3074F SYST BP LT 130 MM HG: CPT | Performed by: FAMILY MEDICINE

## 2023-09-26 NOTE — PROGRESS NOTES
Subjective:   Patient ID: Verenice Mota is a 46year old male. HPI  Mr. Jeanette Harrell is a pleasant 70-year-old gentleman with history of GERD, anxiety, allergic rhinitis here today to follow-up after he had abdominal ultrasound with results as below  Impression   CONCLUSION:    1. Stable simple intrahepatic cyst in the right lobe measuring 1.3 cm.  2. Stable gallbladder polyp measuring 5 mm. 3. There is a complex lesion within the midpole the left kidney which reveals increased internal vascularity and appears increased in size from prior imaging, currently measuring 3.0 x 2.6 x 2.5 cm. Further assessment with contrast enhanced MRI is  recommended to assess for possible neoplastic disease. He does not have fever, cough, chest pain, shortness of breath, nausea, vomiting, abdominal pain, change in bladder or bowel habits but he has been healthy here. He also has been exercising regularly. However a few days ago he had cleaned his left ear with a Q-tip. When he took out the Q-tip there was a green tube that came out with it. He has had mild pain from his left ear since then. However he also reports that he has had bitemporal headaches and has been grinding his teeth. I had reviewed past medical and family histories together with allergy and medication lists documented. History/Other:   Review of Systems  Constitutional: Negative for fatigue and fever. HENT: Negative for sore throat and trouble swallowing. Respiratory: Negative for cough and shortness of breath. Cardiovascular: Negative for chest pain. Neurological: Negative for dizziness, weakness, light-headedness and headaches. Current Outpatient Medications   Medication Sig Dispense Refill    clonazePAM 1 MG Oral Tab Take 1 tablet (1 mg total) by mouth nightly as needed for Anxiety. 30 tablet 1    Multiple Vitamin (MULTI-VITAMIN DAILY) Oral Tab Take 1 tablet by mouth daily.        Allergies:  Augmentin [Amoclan]     NAUSEA AND VOMITING    Comment:VOMIT  Amoxicillin-Pot Cla*    NAUSEA ONLY  Dust Mite Extract       ITCHING    Comment:Eyes. Stuffy nose    Objective:   Physical Exam  Vitals reviewed. Constitutional:       General: He is not in acute distress. HENT:   Ears; external ears normal; normal tympanic membranes and external auditory canals with no discharge no bleeding. No erythema     Mouth/Throat:      Mouth: Mucous membranes are moist.      Pharynx: Oropharynx is clear. Eyes:      General: No scleral icterus. Conjunctiva/sclera: Conjunctivae normal.   Cardiovascular:      Rate and Rhythm: Normal rate and regular rhythm. Heart sounds: Normal heart sounds. No murmur heard. Pulmonary:      Effort: Pulmonary effort is normal. No respiratory distress. Breath sounds: Normal breath sounds. No wheezing or rales. Abdominal:      General: Bowel sounds are normal. There is no distension. Palpations: Abdomen is soft. There is no mass. Tenderness: There is no abdominal tenderness. No CVA tenderness  Musculoskeletal:      Cervical back: Neck supple. Right lower leg: No edema. Left lower leg: No edema. Lymphadenopathy:      Cervical: No cervical adenopathy. Skin:     General: Skin is warm. Neurological:      Mental Status: He is alert. Psychiatric:         Mood and Affect: Mood normal.         Behavior: Behavior normal.   Assessment & Plan:   Nodule of kidney  (primary encounter diagnosis)  -Results reviewed with Loida Kaur  - Likely benign but will proceed with doing MRI of the abdomen and pelvis with contrast  -We will provide him recommendations once we get test results  Left ear pain  -No evidence of infection or perforation on exam  - Possibly related to ongoing tension headaches  - May take Tylenol or ibuprofen as needed    Call or come sooner symptoms worsen or persist.    This note was prepared using [employer] voice recognition dictation software. As a result errors may occur.  When identified these errors have been corrected. While every attempt is made to correct errors during dictation discrepancies may still exist          No orders of the defined types were placed in this encounter.       Meds This Visit:  Requested Prescriptions      No prescriptions requested or ordered in this encounter       Imaging & Referrals:  MRI ABDOMEN+PELVIS (ALL [facility] ONLY) (CPT=74182/63385)

## 2023-09-26 NOTE — PATIENT INSTRUCTIONS
Thank you for choosing Jaydon Castro MD at [place] [address]  To Do: Leann Szymanski  1. Please see below  [facility] is located in [address]. Monday, Tuesday & Friday - 8 a.m. to 4 p.m. Wednesday, Thursday - 7 a.m. to 3 p.m. The lab is closed daily from 12 p.m.-12:30 p.m. Saturday lab hours by appointment. Call [phone] to schedule the appointment. Please signup for [employer], which is electronic access to your record if you have not done so. All your results will post on there. [email or web address]   You can NOW use Sig[employer]ix to book your appointments with us, or consider using open access scheduling which is through the edward website [email or web address] and type in Jaydon Castro MD and follow the links for \"Schedule Online Now\"    To schedule Imaging or tests at [facility] Scheduling [phone], Go to [facility] A ER Building (For example: CT scans, X rays, Ultrasound, MRI)  Cardiac Testing in ER building Building A second floor Cardiac Testing [phone] (For example: Holter Monitor, Cardiac Stress tests,Event Monitor, or 2D Echocardiograms)  [facility] call [phone] usually in dg A  Walk in Clinic in [place] at [facility]. [address] Mon-Fri at 8am-7:30 p.m., and Sat/Sun 9:00a. m.-4:30 p.m. Also at [address]  Call [phone] for info     Please call our office about any questions regarding your treatment/medicines/tests as a result of today's visit. For your safety, read the entire package insert of all medicines prescribed to you and be aware of all of the risks of treatment even beyond those discussed today. All therapies have potential risk of harm or side effects or medication interactions.   It is your duty and for your safety to discuss with the pharmacist and our office with questions, and to notify us and stop treatment if problems arise, but know that our intention is that the benefits outweigh those potential risks and we strive to make you healthier and to improve your quality of life. Referrals, and Radiology Information:    If your insurance requires a referral to a specialist, please allow 5 business days to process your referral request.    If Yinka Mills MD orders a CT or MRI, it may take up to 10 business days to receive approval from your insurance company. Once our office has called informing you that the insurance company approved your testing, please call Central Scheduling at [phone]  Please allow our office 5 business days to contact you regarding any testing results. Refill policies:   Allow 3 business days for refills; controlled substances may take longer and must be picked up from the office in person. Narcotic medications can only be filled in 30 day increments and must be refilled at an office visit only. If your prescription is due for a refill, you may be due for a follow-up appointment. We cannot refill your maintenance medications at a preventative wellness visit. To best provide you care, patients receiving maintenance medications need to be seen at least twice a year.

## 2023-10-01 DIAGNOSIS — F51.01 PRIMARY INSOMNIA: ICD-10-CM

## 2023-10-01 RX ORDER — CLONAZEPAM 1 MG/1
1 TABLET ORAL NIGHTLY PRN
Qty: 30 TABLET | Refills: 1 | Status: SHIPPED | OUTPATIENT
Start: 2023-10-01

## 2023-10-30 ENCOUNTER — HOSPITAL ENCOUNTER (OUTPATIENT)
Dept: MRI IMAGING | Facility: HOSPITAL | Age: 48
Discharge: HOME | End: 2023-10-30
Attending: FAMILY MEDICINE
Payer: COMMERCIAL

## 2023-10-30 DIAGNOSIS — N28.89 NODULE OF KIDNEY: ICD-10-CM

## 2023-10-30 PROCEDURE — A9575 INJ GADOTERATE MEGLUMI 0.1ML: HCPCS | Performed by: FAMILY MEDICINE

## 2023-10-30 PROCEDURE — 72197 MRI PELVIS W/O & W/DYE: CPT | Performed by: FAMILY MEDICINE

## 2023-10-30 PROCEDURE — 74183 MRI ABD W/O CNTR FLWD CNTR: CPT | Performed by: FAMILY MEDICINE

## 2023-10-30 RX ORDER — GADOTERATE MEGLUMINE 376.9 MG/ML
20 INJECTION INTRAVENOUS
Status: COMPLETED | OUTPATIENT
Start: 2023-10-30 | End: 2023-10-30

## 2023-10-30 RX ADMIN — GADOTERATE MEGLUMINE 18 ML: 376.9 INJECTION INTRAVENOUS at 15:24:00

## 2023-11-02 ENCOUNTER — OFFICE VISIT (OUTPATIENT)
Dept: SURGERY | Facility: CLINIC | Age: 48
End: 2023-11-02

## 2023-11-02 DIAGNOSIS — N28.89 RENAL MASS: Primary | ICD-10-CM

## 2023-11-02 PROCEDURE — 99214 OFFICE O/P EST MOD 30 MIN: CPT | Performed by: UROLOGY

## 2023-11-02 NOTE — PROGRESS NOTES
Urology Clinic Note    Primary Care Provider:  Easton Llanos MD     Chief Complaint:   Kidney mass     HPI:   Arcadio Carey is a 46year old male with history of anxiety, GERD referred for painful erections and now with renal mass. He initially saw me 9/11/23; had a slight bulge at the distal aspect of his penis about 1-2 months ago. This resolved, but he then noticed some pain with erections and a slight bend to the penis about 2 weeks before visit. The bend is to the left/dorsal but he is unsure of the degree. The pain in the penis has improved but he still has some degree of this. He has not tried medication for the pain. No trauma recently; does say he had some painful intercourse in the past but never with immediate detumescence; does believe that he felled a \"pop\" at some point.  with 1 child. No erectile dysfunction and is still able to have intercourse. No voiding complaints. Does not have a history of Dupuytren's contracture. Given the above; we discussed NSAIDS for pain for acute phase peyronies. He was to take a picture of curve and bring to clinic in 6mo. He returns earlier than scheduled as a recent US was done for gallbladder polyp and this showed a renal mass. A MR was done by his PCP on 10/30/23;   KIDNEYS:  There is a complex, partially solid, partially cystic mass along the midpole of the left kidney that measures approximately 3.2 x 3.5 x 4.3 cm in craniocaudal, AP and transverse dimensions respectively. Post-contrast imaging reveals   enhancement involving the solid portions of the lesion and non enhancement involving the cystic regions within this lesion. Multiple simple nonenhancing bilateral renal cysts are also identified, the largest of which involves the left superior pole   measuring 0.7 cm. No evidence of hydronephrosis or perinephric fluid/inflammatory changes. He denies any flank pain. No gross hematuria. No systemic symptoms. No weight loss.    No changes to his peyronies- believes pain is somewhat better.         UA NEGATIVE     PSA:  Lab Results   Component Value Date    PSAS 0.64 03/20/2023    PSAS 0.62 07/09/2019        History:     Past Medical History:   Diagnosis Date    Abdominal hernia     Had surgery back in 2017 I believe    Abdominal pain Off and on for a few months    Anxiety 11/06/2012    Arthritis of right knee 08/24/2017    Belching     Bloating     Chest pain     Decorative tattoo     Deviated septum 08/02/2013    Chronic sinus     Dyslipidemia 09/06/2013    Eczema 04/25/2013    Flatulence/gas pain/belching     GERD (gastroesophageal reflux disease) EGD 8/19 11/06/2012    Headache disorder     Dealt with migraines since I was a child    Heartburn     Diet dependant    Irregular bowel habits     BERTO (obstructive sleep apnea) 12/08/2012    Severe on cpap     Primary insomnia 08/02/2017    Right inguinal hernia 08/24/2017    Fat hernia    Unspecified internal derangement of knee 10/17/2013    R      Vitamin D deficiency 05/05/2017    Vomiting     Wears glasses        Past Surgical History:   Procedure Laterality Date    HERNIA SURGERY Right 09/14/2017    OTHER      JAW WIRED SHUT  [address]    2014    april ent    TONSILLECTOMY  2014    UPPER GI ENDOSCOPY PERFORMED  8/22/19= Hiatal hernia       Family History   Problem Relation Age of Onset    Cancer Sister         breast cancer    Cancer Paternal Grandmother         lung cancer    Breast Cancer Sister        Social History     Socioeconomic History    Marital status:    Tobacco Use    Smoking status: Former     Packs/day: 1.00     Years: 10.00     Additional pack years: 0.00     Total pack years: 10.00     Types: Cigarettes     Quit date: 8/1/2013     Years since quitting: 10.2    Smokeless tobacco: Never   Substance and Sexual Activity    Alcohol use: Not Currently     Comment: Infrequently    Drug use: Not Currently     Types: Cannabis       Medications (Active prior to today's visit):  Current Outpatient Medications   Medication Sig Dispense Refill    clonazePAM 1 MG Oral Tab Take 1 tablet (1 mg total) by mouth nightly as needed for Anxiety. 30 tablet 1    Multiple Vitamin (MULTI-VITAMIN DAILY) Oral Tab Take 1 tablet by mouth daily. Allergies:    Augmentin [Amoclan]     NAUSEA AND VOMITING    Comment:VOMIT  Amoxicillin-Pot Cla*    NAUSEA ONLY  Dust Mite Extract       ITCHING    Comment:Eyes. Stuffy nose    Review of Systems:   A comprehensive 10-point review of systems was completed. Pertinent positives and negatives are noted in the the HPI. Physical Exam:   CONSTITUTIONAL: Well developed, well nourished, in no acute distress  NEUROLOGIC: Alert and oriented  SKIN: No evident rashes  ABDOMEN: Soft, non-tender, non-distended     MRI ABDOMEN+PELVIS (ALL W+WO) (RYD=[id number])    Result Date: 10/30/2023  CONCLUSION:  1. There is a complex mass within the midpole the left kidney that has both solid and cystic components, measuring 3.2 x 3.5 x 4.3 cm in maximal dimensions. The appearance is concerning for a neoplastic process and image guided renal biopsy is recommended. 2. Incidental simple nonenhancing hepatic and bilateral renal cysts. These are consistent with benign cysts and no further workup of these particular cysts is required or recommended. LOCATION:  [facility]   Dictated by (CST): Julián Adorno DO on 10/30/2023 at 4:48 PM     Finalized by (CST): Julián Adorno DO on 10/30/2023 at 4:56 PM          Assessment & Plan:     Maryse Manzo is a 46year old male with history of anxiety, GERD referred for painful erections and now with renal mass. # Renal mass; Patient previously noted to have a small complex cyst in kidney in 2019, was stable on US in 2022. Now appears enlarged and with solid component. This has a complex appearance with irregular margins abutting the collecting system. Radiology recommending biopsy. Will discuss at upcoming tumor board. Will obtain CXR for staging. I counseled patient extensive on imaging findings and that given the concern for neoplasm a partial vs radical nephrectomy may be indicated. His renal function is wnl. Briefly discussed surgery, full discussion pending the above. # Peyronie's;   Not discussed today. Will readdress in the future. In total, 30 minutes were spent on this patient encounter (including chart review, patient history, physical, and counseling, documentation, and communication).     Ron Lopez MD  Staff Urologist  [place] [address]  Office: [phone]

## 2023-11-03 ENCOUNTER — HOSPITAL ENCOUNTER (OUTPATIENT)
Dept: GENERAL RADIOLOGY | Age: 48
Discharge: HOME | End: 2023-11-03
Attending: UROLOGY
Payer: COMMERCIAL

## 2023-11-03 ENCOUNTER — TELEPHONE (OUTPATIENT)
Dept: SURGERY | Facility: CLINIC | Age: 48
End: 2023-11-03

## 2023-11-03 DIAGNOSIS — N28.89 RENAL MASS: ICD-10-CM

## 2023-11-03 PROCEDURE — 71046 X-RAY EXAM CHEST 2 VIEWS: CPT | Performed by: UROLOGY

## 2023-11-06 ENCOUNTER — TELEPHONE (OUTPATIENT)
Dept: SURGERY | Facility: CLINIC | Age: 48
End: 2023-11-06

## 2023-11-06 NOTE — TELEPHONE ENCOUNTER
This encounter is now closed. See [employer] message.  RN forwarded to Dr Margot Vazquez to address all his questions and concerns sent via [facility] [address]

## 2023-11-06 NOTE — TELEPHONE ENCOUNTER
Called patient to discuss results from tumor board discussion. Consensus was to proceed with nephrectomy given characteristics of mass. I called patient and discussed; we discussed that biopsy could still be utilized, however given appearance of mass it likely would not change our management. He would like to proceed with surgery and is not interested in renal mass biopsy. We briefly discussed the procedure (robotic assisted left nephrectomy). Patient will see Dr. Nelson Earl for this. Will arrange for a telehealth visit with Dr. Nelson Earl 1-2 weeks for further discussion per patient preference.       Jimmy Carroll MD  Staff Urologist  [facility]  Office: [phone]

## 2023-11-09 ENCOUNTER — TELEPHONE (OUTPATIENT)
Dept: SURGERY | Facility: CLINIC | Age: 48
End: 2023-11-09

## 2023-11-09 ENCOUNTER — VIRTUAL PHONE E/M (OUTPATIENT)
Dept: SURGERY | Facility: CLINIC | Age: 48
End: 2023-11-09
Payer: COMMERCIAL

## 2023-11-09 DIAGNOSIS — N28.89 RENAL MASS: Primary | ICD-10-CM

## 2023-11-09 DIAGNOSIS — F51.01 PRIMARY INSOMNIA: ICD-10-CM

## 2023-11-09 PROCEDURE — 99442 PHONE E/M BY PHYS 11-20 MIN: CPT | Performed by: SURGERY

## 2023-11-09 RX ORDER — CLONAZEPAM 1 MG/1
1 TABLET ORAL NIGHTLY PRN
Qty: 30 TABLET | Refills: 1 | Status: SHIPPED | OUTPATIENT
Start: 2023-11-09

## 2023-11-09 NOTE — TELEPHONE ENCOUNTER
Hi,    Can you please schedule this patient for surgery. Can you arrange for this patient to have a CBC, BMP, PT/INR 1-2 weeks prior to surgery? Thanks,  MB    Urology Surgery Request  Surgeon: Clarence Coronel  Location (if known): [facility]  Procedure: Robot-assisted laparoscopic LEFT radical nephrectomy  Anesthesia: General   Time Frame: 12/6/23 or 12/20/23  Time required: 200 minutes  Diagnosis: Renal mass  Special Equipment: Robot, intraoperative US    Antibiotics: per hospital protocol unless checked below   ___ Levaquin 500 mg IV   ___ Gemcitabine 2 g/100 mL NS bladder instillation to be given in OR    Estimated Post Op/Follow Up Appt:   2 weeks for post-op visit. Please schedule appointment at time of surgery scheduling.

## 2023-11-09 NOTE — PROGRESS NOTES
Urology Clinic Note  Telemedicine Visit  Audio Only  The patient consented to performing this visit virtually. Primary Care Provider:  Yadira Penaloza MD     Chief Complaint:   Left renal mass    HPI:   Selwyn Lundberg is a 46year old male with history of anxiety, GERD, BERTO, OA referred for painful erections and now with renal mass. He was previously seen and counseled by my partner Dr. Dulce Rodney. At his initial visit on 9/11/2023, NSAIDs were recommended for acute phase Peyronie's disease. Also, renal ultrasound on 9/22/2023 showed a 3 cm complex lesion in the left kidney. Follow-up MRI on 10/30/2023 showed a 4.3 cm partially solid, partially cystic complex enhancing endophytic interpolar renal mass. This mass was reviewed in  tumor board and consensus decision was to proceed with radical nephrectomy. I reviewed the images today and agree with this finding as the mass is quite endophytic and central and appears suspicious for RCC on imaging.   Chest x-ray on 11/3/2023 was normal.    Baseline creatinine is 1.0-1.05.    Abdominal surgeries: Prior laparoscopic right inguinal hernia repair    PSA:  Lab Results   Component Value Date    PSAS 0.64 03/20/2023    PSAS 0.62 07/09/2019        History:     Past Medical History:   Diagnosis Date    Abdominal hernia     Had surgery back in 2017 I believe    Abdominal pain Off and on for a few months    Anxiety 11/06/2012    Arthritis of right knee 08/24/2017    Belching     Bloating     Chest pain     Decorative tattoo     Deviated septum 08/02/2013    Chronic sinus     Dyslipidemia 09/06/2013    Eczema 04/25/2013    Flatulence/gas pain/belching     GERD (gastroesophageal reflux disease) EGD 8/19 11/06/2012    Headache disorder     Dealt with migraines since I was a child    Heartburn     Diet dependant    Irregular bowel habits     BERTO (obstructive sleep apnea) 12/08/2012    Severe on cpap     Primary insomnia 08/02/2017    Right inguinal hernia 08/24/2017 Fat hernia    Unspecified internal derangement of knee 10/17/2013    R      Vitamin D deficiency 05/05/2017    Vomiting     Wears glasses        Past Surgical History:   Procedure Laterality Date    HERNIA SURGERY Right 09/14/2017    OTHER      JAW WIRED SHUT  [address]    2014    april ent    TONSILLECTOMY  2014    UPPER GI ENDOSCOPY PERFORMED  8/22/19= Hiatal hernia       Family History   Problem Relation Age of Onset    Cancer Sister         breast cancer    Cancer Paternal Grandmother         lung cancer    Breast Cancer Sister        Social History     Socioeconomic History    Marital status:    Tobacco Use    Smoking status: Former     Packs/day: 1.00     Years: 10.00     Additional pack years: 0.00     Total pack years: 10.00     Types: Cigarettes     Quit date: 8/1/2013     Years since quitting: 10.2    Smokeless tobacco: Never   Substance and Sexual Activity    Alcohol use: Not Currently     Comment: Infrequently    Drug use: Not Currently     Types: Cannabis       Medications (Active prior to today's visit):  Current Outpatient Medications   Medication Sig Dispense Refill    clonazePAM 1 MG Oral Tab Take 1 tablet (1 mg total) by mouth nightly as needed for Anxiety. 30 tablet 1    Multiple Vitamin (MULTI-VITAMIN DAILY) Oral Tab Take 1 tablet by mouth daily. Allergies: Allergies   Allergen Reactions    Augmentin [Amoclan] NAUSEA AND VOMITING     VOMIT    Amoxicillin-Pot Clavulanate NAUSEA ONLY    Dust Mite Extract ITCHING     Eyes. Stuffy nose       Review of Systems:   A comprehensive 10-point review of systems was completed. Pertinent positives and negatives are noted in the the HPI. Physical Exam:   No physical exam performed during this telephone encounter. Assessment & Plan:   Nga Vazquez is a 46year old male with history of anxiety, GERD, BERTO, OA referred for painful erections and now with renal mass.   He was previously seen and counseled by my partner Dr. Graciela Contreras. At his initial visit on 9/11/2023, NSAIDs were recommended for acute phase Peyronie's disease. Also, renal ultrasound on 9/22/2023 showed a 3 cm complex lesion in the left kidney. Follow-up MRI on 10/30/2023 showed a 4.3 cm partially solid, partially cystic complex enhancing endophytic interpolar renal mass. This mass was reviewed in  tumor board and consensus decision was to proceed with radical nephrectomy. I reviewed the images today and agree with this finding as the mass is quite endophytic and central and appears suspicious for RCC on imaging. Chest x-ray on 11/3/2023 was normal.    I discussed with the patient the following options for small renal masses, based on the American Urologic Association guidelines. 1.  Active surveillance - for patients with a solid renal mass less than 2 cm this is the preferred method of management. Active surveillance protocol includes cross-sectional imaging in 3 to 6 months to assess for interval growth. If there is growth of the tumor to greater than 3 cm, stage progression, growth kinetics of greater than 5 mm/year, or change in patient/tumor factors the patient would move from active surveillance to treatment options. 2.  Thermal/Cryo ablation - thermal ablation is an approach for cT1a solid renal masses less than 3 cm. Risks include of tumor persistence, local recurrence/need for additional treatment (~15%), and potential dermal spread. 3.  Radical nephrectomy- radical nephrectomy is typically indicated when there is high tumor complexity and partial nephrectomy be would challenging even in experienced hands. Additionally the patient should typically have no pre-existing CKD or proteinuria and have a normal contralateral kidney.  Complications include bleeding (immediate or delayed), infection, damage to surrounding structures (bowel, ureter, major blood vessels, spleen, stomach and pancreas for a left-sided tumor and liver right-sided tumor), need for additional procedures, or conversion to open surgery. Hospital course is typically 1-2 nights in the hospital.     4.  Partial nephrectomy- partial nephrectomy is favored to help spare healthy nephrons when surgery is opted for. I perform this via a minimally-invasive robotic approach. Complications include bleeding (immediate or delayed), infection, damage to surrounding structures (bowel, ureter, major blood vessels, spleen, stomach and pancreas for a left-sided tumor and liver right-sided tumor), need for additional procedures, inability to remove entire tumor, tumor recurrence, pseudoaneurysm, conversion to open surgery, or need for conversion to radical nephrectomy. Hospital course is typically 1-2 nights in the hospital.     5.  Renal mass biopsy -this is typically performed when the mass is of unclear appearance (hematologic, metastatic, inflammatory or infectious). It is typically done when biopsy is thought to change patient's management. Mass should additionally be in a feasible location for biopsy by intervention al radiology. -After discussion of all of the above options, he elects to proceed with robotic radical left nephrectomy    In total, 15 minutes were spent on this patient encounter for medical discussion. Jace Baumgarten.  Ton Herrera MD  Staff Urologist  [facility]  Office: [phone]

## 2023-11-14 ENCOUNTER — TELEPHONE (OUTPATIENT)
Dept: SURGERY | Facility: CLINIC | Age: 48
End: 2023-11-14

## 2023-11-15 ENCOUNTER — TELEPHONE (OUTPATIENT)
Dept: SURGERY | Facility: CLINIC | Age: 48
End: 2023-11-15

## 2023-11-15 NOTE — TELEPHONE ENCOUNTER
Patient scheduling RALN surgery for 12/6/23, has questions regarding Bowel Prep (stated he has a Hiatal Hernia) and he also wants to know if he will need an EKG. Please contact to discuss.

## 2023-11-16 RX ORDER — CETIRIZINE HYDROCHLORIDE 5 MG/1
5 TABLET ORAL DAILY
COMMUNITY

## 2023-11-21 ENCOUNTER — LABORATORY ENCOUNTER (OUTPATIENT)
Dept: LAB | Age: 48
End: 2023-11-21
Attending: SURGERY
Payer: COMMERCIAL

## 2023-11-21 DIAGNOSIS — N28.89 RENAL MASS: ICD-10-CM

## 2023-11-21 LAB
ANION GAP SERPL CALC-SCNC: 2 MMOL/L (ref 0–18)
ANTIBODY SCREEN: NEGATIVE
BASOPHILS # BLD AUTO: 0.06 X10(3) UL (ref 0–0.2)
BASOPHILS NFR BLD AUTO: 0.8 %
BUN BLD-MCNC: 18 MG/DL (ref 9–23)
CALCIUM BLD-MCNC: 8.9 MG/DL (ref 8.5–10.1)
CHLORIDE SERPL-SCNC: 107 MMOL/L (ref 98–112)
CO2 SERPL-SCNC: 28 MMOL/L (ref 21–32)
CREAT BLD-MCNC: 1.25 MG/DL
EGFRCR SERPLBLD CKD-EPI 2021: 70 ML/MIN/1.73M2 (ref 60–?)
EOSINOPHIL # BLD AUTO: 0.26 X10(3) UL (ref 0–0.7)
EOSINOPHIL NFR BLD AUTO: 3.6 %
ERYTHROCYTE [DISTWIDTH] IN BLOOD BY AUTOMATED COUNT: 13.1 %
FASTING STATUS PATIENT QL REPORTED: YES
GLUCOSE BLD-MCNC: 105 MG/DL (ref 70–99)
HCT VFR BLD AUTO: 42 %
HGB BLD-MCNC: 14.2 G/DL
IMM GRANULOCYTES # BLD AUTO: 0.03 X10(3) UL (ref 0–1)
IMM GRANULOCYTES NFR BLD: 0.4 %
INR BLD: 1.01 (ref 0.8–1.2)
LYMPHOCYTES # BLD AUTO: 2.34 X10(3) UL (ref 1–4)
LYMPHOCYTES NFR BLD AUTO: 32.2 %
MCH RBC QN AUTO: 31.1 PG (ref 26–34)
MCHC RBC AUTO-ENTMCNC: 33.8 G/DL (ref 31–37)
MCV RBC AUTO: 92.1 FL
MONOCYTES # BLD AUTO: 0.54 X10(3) UL (ref 0.1–1)
MONOCYTES NFR BLD AUTO: 7.4 %
NEUTROPHILS # BLD AUTO: 4.04 X10 (3) UL (ref 1.5–7.7)
NEUTROPHILS # BLD AUTO: 4.04 X10(3) UL (ref 1.5–7.7)
NEUTROPHILS NFR BLD AUTO: 55.6 %
OSMOLALITY SERPL CALC.SUM OF ELEC: 286 MOSM/KG (ref 275–295)
PLATELET # BLD AUTO: 306 10(3)UL (ref 150–450)
POTASSIUM SERPL-SCNC: 4.1 MMOL/L (ref 3.5–5.1)
PROTHROMBIN TIME: 13.3 SECONDS (ref 11.6–14.8)
RBC # BLD AUTO: 4.56 X10(6)UL
RH BLOOD TYPE: POSITIVE
SODIUM SERPL-SCNC: 137 MMOL/L (ref 136–145)
WBC # BLD AUTO: 7.3 X10(3) UL (ref 4–11)

## 2023-11-21 PROCEDURE — 86900 BLOOD TYPING SEROLOGIC ABO: CPT

## 2023-11-21 PROCEDURE — 86901 BLOOD TYPING SEROLOGIC RH(D): CPT

## 2023-11-21 PROCEDURE — 36415 COLL VENOUS BLD VENIPUNCTURE: CPT

## 2023-11-21 PROCEDURE — 85610 PROTHROMBIN TIME: CPT

## 2023-11-21 PROCEDURE — 85025 COMPLETE CBC W/AUTO DIFF WBC: CPT

## 2023-11-21 PROCEDURE — 80048 BASIC METABOLIC PNL TOTAL CA: CPT

## 2023-11-21 PROCEDURE — 86850 RBC ANTIBODY SCREEN: CPT

## 2023-11-25 ENCOUNTER — PATIENT MESSAGE (OUTPATIENT)
Dept: SURGERY | Facility: CLINIC | Age: 48
End: 2023-11-25

## 2023-11-30 ENCOUNTER — TELEPHONE (OUTPATIENT)
Dept: SURGERY | Facility: CLINIC | Age: 48
End: 2023-11-30

## 2023-11-30 RX ORDER — VANCOMYCIN HYDROCHLORIDE
15 ONCE
Status: CANCELLED | OUTPATIENT
Start: 2023-12-06 | End: 2023-11-30

## 2023-11-30 NOTE — PAT NURSING NOTE
S/w Dillon at Dr. Serjio Graff office to verify okay to use cbc, bmp, pt/inr completed 15 days prior to surgery.

## 2023-11-30 NOTE — TELEPHONE ENCOUNTER
Received a call from [place] in PAT. Pt had blood work/labs done 15 days prior to the surgery date. Sent Dr. Annie Gu and Maria De Jesus Yeh a secure chat to ask if okay.

## 2023-11-30 NOTE — TELEPHONE ENCOUNTER
Returned phone call- all questions answered. Will address ED further once nephrectomy is completed next week. Regarding: [place]  Contact: [phone]  ----- Message from Rafael Yolande Li PA-C sent at 11/29/2023  4:55 PM CST -----       ----- Message from Wandy Orellana to Atul Dowell MD sent at 11/25/2023 10:16 AM -----   Hey there Freddy Pimentel all is well with you. I know we have this surgery coming up and that's where my focus is, but I have two questions. The first is regarding the surgery. When we are removing this kidney with the mass, is there any danger of the mass rupturing or spreading or anything during removal?     Second, regarding the Peyronie's, the curvature is pretty severe and I'm starting to suffer ED, so I'd like to get myself back to normal in that area as soon as possible, as it's starting to affect my self-confidence and other areas of my life. Let me know your thoughts.     Thanks,  Moriah Llanos

## 2023-12-04 ENCOUNTER — TELEPHONE (OUTPATIENT)
Dept: SURGERY | Facility: CLINIC | Age: 48
End: 2023-12-04

## 2023-12-04 NOTE — TELEPHONE ENCOUNTER
Received a return call from KARTHIK in [facility]. She stated that the concern with the bowel prep and his Hiatal Hernia is to go to the office/surgery. Routing to the Urology Clinical Staff.

## 2023-12-04 NOTE — TELEPHONE ENCOUNTER
Patient has surgery on Wednesday, 12/04/23. Reviewed the Bowel Prep with him, he is concerned about the bowel prep \"throwing my stomach into a spiral\" as he stated a bowel prep had done when prepping for a Colonoscopy and caused him to stay in the hospital for 3 day after. He also said that he received 3 sets of instructions (2 from Urology and most likely 1 from PAT) that contradicted. Tried to explain that after the Urology instructions that states to stop eating and drinking in the late pm, that the PAT instructions that allow him to continue to drink Gatorade should be followed. Requesting PAT to contact him to discuss and explain further. Contacted PAT and LVM to contact the patient and/or me to discuss further.

## 2023-12-05 ENCOUNTER — PATIENT MESSAGE (OUTPATIENT)
Dept: SURGERY | Facility: CLINIC | Age: 48
End: 2023-12-05

## 2023-12-05 NOTE — H&P
UROLOGY PRE-OPERATIVE HISTORY & PHYSICAL      Hill Saldaña [facility] Patient Status:  Outpatient in a Bed    4/3/1975 MRN [id number]   [facility] SURGERY Attending Kathi Young MD   Hosp Day # 0 PCP Reed Whitaker MD     Primary Care Provider: Vandana Jay MD     Chief Complaint:   Left renal mass     HPI:   Froilan Miranda is a 46year old male with history of anxiety, GERD, BERTO, OA referred for renal mass. Renal ultrasound on 2023 showed a 3 cm complex lesion in the left kidney. Follow-up MRI on 10/30/2023 showed a 4.3 cm partially solid, partially cystic complex enhancing endophytic interpolar renal mass. This mass was reviewed in  tumor board and consensus decision was to proceed with radical nephrectomy. I reviewed the images and agree with this finding as the mass is quite endophytic and central and appears suspicious for RCC on imaging. Chest x-ray on 11/3/2023 was normal.     Pre-operative creatinine is 1.25.     Abdominal surgeries: Prior laparoscopic right inguinal hernia repair    History:     Past Medical History:   Diagnosis Date    Abdominal hernia     Had surgery back in  I believe    Abdominal pain Off and on for a few months    Anxiety 2012    Arthritis of right knee 2017    Belching     Bloating     Chest pain     Decorative tattoo     Deviated septum 2013    Chronic sinus     Dyslipidemia 2013    Eczema 2013    Flatulence/gas pain/belching     GERD (gastroesophageal reflux disease) EGD 2012    Headache disorder     Dealt with migraines since I was a child    Heartburn     Diet dependant    Intractable vomiting with nausea, unspecified vomiting type 2019    Irregular bowel habits     BERTO (obstructive sleep apnea) 2012    Severe on cpap     PONV (postoperative nausea and vomiting)     Primary insomnia 2017    Right inguinal hernia 2017    Fat hernia    Sleep apnea     post surgical Unspecified internal derangement of knee 10/17/2013    R      Vitamin D deficiency 05/05/2017    Vomiting     Wears glasses        Past Surgical History:   Procedure Laterality Date    CREATE EARDRUM OPENING,GEN ANESTH      HERNIA SURGERY Right 09/14/2017    OTHER      JAW WIRED SHUT  [address]    2014    april ent    TONSILLECTOMY  2014    UPPER GI ENDOSCOPY PERFORMED  8/22/19= Hiatal hernia       Family History   Problem Relation Age of Onset    Cancer Sister         breast cancer    Cancer Paternal Grandmother         lung cancer    Breast Cancer Sister        Social History     Socioeconomic History    Marital status:    Tobacco Use    Smoking status: Former     Packs/day: 1.00     Years: 10.00     Additional pack years: 0.00     Total pack years: 10.00     Types: Cigarettes     Quit date: 8/1/2013     Years since quitting: 10.3    Smokeless tobacco: Never   Vaping Use    Vaping Use: Never used   Substance and Sexual Activity    Alcohol use: Not Currently     Comment: Infrequently    Drug use: Yes     Types: Cannabis     Comment: social       Medications:  Current Outpatient Medications   Medication Sig Dispense Refill    cetirizine 5 MG Oral Tab Take 1 tablet (5 mg total) by mouth daily. clonazePAM 1 MG Oral Tab Take 1 tablet (1 mg total) by mouth nightly as needed for Anxiety. 30 tablet 1    Multiple Vitamin (MULTI-VITAMIN DAILY) Oral Tab Take 1 tablet by mouth daily. Allergies: Allergies   Allergen Reactions    Augmentin [Amoclan] NAUSEA AND VOMITING     VOMIT    Amoxicillin-Pot Clavulanate NAUSEA ONLY    Dust Mite Extract ITCHING     Eyes. Stuffy nose       Review of Systems:   A comprehensive 10-point review of systems was completed. Pertinent positives and negatives are noted in the the HPI. Physical Exam:   Vital Signs:  Height 6' 1\" (1.854 m), weight 190 lb (86.2 kg).      CONSTITUTIONAL: Well developed, well nourished, in no acute distress  NEUROLOGIC: Alert and oriented  HEAD: Normocephalic, atraumatic  EYES: Sclera non-icteric  ENT: Hearing intact, moist mucous membranes  NECK: No obvious goiter or masses  RESPIRATORY: Normal respiratory effort  SKIN: No evident rashes  ABDOMEN: Soft, non-tender, non-distended    Laboratory Data:  Lab Results   Component Value Date    WBC 7.3 11/21/2023    HGB 14.2 11/21/2023    .0 11/21/2023     Lab Results   Component Value Date     11/21/2023    K 4.1 11/21/2023     11/21/2023    CO2 28.0 11/21/2023    BUN 18 11/21/2023     (H) 11/21/2023    GFRAA 105 09/08/2021    AST 25 03/20/2023    ALT 37 03/20/2023    TP 6.9 03/20/2023    ALB 3.9 03/20/2023    CA 8.9 11/21/2023       Urinalysis Results (last three years):  Recent Labs     09/11/23  1052   SPECGRAVITY 1.015   PHURINE 6.0   NITRITE Negative       Urine Culture Results (last three years):  No results found for: \"URINECUL\"    PSA:  Lab Results   Component Value Date    PSAS 0.64 03/20/2023    PSAS 0.62 07/09/2019        Imaging (last three days):  No results found. Assessment:   Annemarie Herbert is a 46year old male with history of anxiety, GERD, BERTO, OA referred for renal mass. Renal ultrasound on 9/22/2023 showed a 3 cm complex lesion in the left kidney. Follow-up MRI on 10/30/2023 showed a 4.3 cm partially solid, partially cystic complex enhancing endophytic interpolar renal mass. This mass was reviewed in  tumor board and consensus decision was to proceed with radical nephrectomy. I reviewed the images and agree with this finding as the mass is quite endophytic and central and appears suspicious for RCC on imaging. Chest x-ray on 11/3/2023 was normal.     Pre-operative creatinine is 1.25.     Abdominal surgeries: Prior laparoscopic right inguinal hernia repair    Plan:   - OR for robotic radical LEFT nephrectomy   - Informed consent obtained - risks and benefits explained, and all questions answered    I have personally reviewed all relevant medical records, labs, and imaging. Maria Eugenia Lyons.  Adrian Pham MD  Staff Urologist  [place] [address]  Office: [phone]

## 2023-12-05 NOTE — TELEPHONE ENCOUNTER
From: Manju Hennessy  To: Leopold Old  Sent: 12/5/2023 1:18 PM CST  Subject: Clonazepam    Hi Doctor Maria De Jesus Yeh,    Just wondering if I am allowed or should avoid Clonazepam today. A bit anxious, nervous, didn't sleep well last night. .. Please let me know at your earliest convenience.      Thanks,  Meseret Mccormack

## 2023-12-06 ENCOUNTER — HOSPITAL ENCOUNTER (INPATIENT)
Facility: HOSPITAL | Age: 48
LOS: 1 days | Discharge: HOME | End: 2023-12-08
Attending: SURGERY | Admitting: SURGERY
Payer: COMMERCIAL

## 2023-12-06 ENCOUNTER — ANESTHESIA (OUTPATIENT)
Dept: SURGERY | Facility: HOSPITAL | Age: 48
End: 2023-12-06
Payer: COMMERCIAL

## 2023-12-06 ENCOUNTER — ANESTHESIA EVENT (OUTPATIENT)
Dept: SURGERY | Facility: HOSPITAL | Age: 48
End: 2023-12-06
Payer: COMMERCIAL

## 2023-12-06 DIAGNOSIS — N28.89 RENAL MASS: Primary | ICD-10-CM

## 2023-12-06 DIAGNOSIS — R79.89 ELEVATED SERUM CREATININE: ICD-10-CM

## 2023-12-06 LAB
ANION GAP SERPL CALC-SCNC: 9 MMOL/L (ref 0–18)
BUN BLD-MCNC: 11 MG/DL (ref 9–23)
CALCIUM BLD-MCNC: 8.5 MG/DL (ref 8.5–10.1)
CHLORIDE SERPL-SCNC: 110 MMOL/L (ref 98–112)
CO2 SERPL-SCNC: 20 MMOL/L (ref 21–32)
CREAT BLD-MCNC: 1.3 MG/DL
EGFRCR SERPLBLD CKD-EPI 2021: 67 ML/MIN/1.73M2 (ref 60–?)
ERYTHROCYTE [DISTWIDTH] IN BLOOD BY AUTOMATED COUNT: 13.1 %
GLUCOSE BLD-MCNC: 140 MG/DL (ref 70–99)
HCT VFR BLD AUTO: 41.1 %
HGB BLD-MCNC: 14.5 G/DL
MCH RBC QN AUTO: 31.3 PG (ref 26–34)
MCHC RBC AUTO-ENTMCNC: 35.3 G/DL (ref 31–37)
MCV RBC AUTO: 88.8 FL
OSMOLALITY SERPL CALC.SUM OF ELEC: 290 MOSM/KG (ref 275–295)
PLATELET # BLD AUTO: 303 10(3)UL (ref 150–450)
POTASSIUM SERPL-SCNC: 3.8 MMOL/L (ref 3.5–5.1)
RBC # BLD AUTO: 4.63 X10(6)UL
SODIUM SERPL-SCNC: 139 MMOL/L (ref 136–145)
WBC # BLD AUTO: 21 X10(3) UL (ref 4–11)

## 2023-12-06 PROCEDURE — 50545 LAPARO RADICAL NEPHRECTOMY: CPT | Performed by: UROLOGY

## 2023-12-06 PROCEDURE — 50545 LAPARO RADICAL NEPHRECTOMY: CPT | Performed by: SURGERY

## 2023-12-06 PROCEDURE — 0TT14ZZ RESECTION OF LEFT KIDNEY, PERCUTANEOUS ENDOSCOPIC APPROACH: ICD-10-PCS | Performed by: SURGERY

## 2023-12-06 PROCEDURE — 8E0W4CZ ROBOTIC ASSISTED PROCEDURE OF TRUNK REGION, PERCUTANEOUS ENDOSCOPIC APPROACH: ICD-10-PCS | Performed by: SURGERY

## 2023-12-06 RX ORDER — ACETAMINOPHEN 10 MG/ML
INJECTION, SOLUTION INTRAVENOUS AS NEEDED
Status: DISCONTINUED | OUTPATIENT
Start: 2023-12-06 | End: 2023-12-06 | Stop reason: SURG

## 2023-12-06 RX ORDER — HYDROMORPHONE HYDROCHLORIDE 1 MG/ML
0.4 INJECTION, SOLUTION INTRAMUSCULAR; INTRAVENOUS; SUBCUTANEOUS EVERY 2 HOUR PRN
Status: DISCONTINUED | OUTPATIENT
Start: 2023-12-06 | End: 2023-12-08

## 2023-12-06 RX ORDER — METOCLOPRAMIDE HYDROCHLORIDE 5 MG/ML
10 INJECTION INTRAMUSCULAR; INTRAVENOUS ONCE
Status: COMPLETED | OUTPATIENT
Start: 2023-12-06 | End: 2023-12-06

## 2023-12-06 RX ORDER — OXYCODONE HYDROCHLORIDE 5 MG/1
5 TABLET ORAL EVERY 4 HOURS PRN
Status: DISCONTINUED | OUTPATIENT
Start: 2023-12-06 | End: 2023-12-08

## 2023-12-06 RX ORDER — POLYETHYLENE GLYCOL 3350 17 G/17G
17 POWDER, FOR SOLUTION ORAL DAILY
Status: DISCONTINUED | OUTPATIENT
Start: 2023-12-06 | End: 2023-12-08

## 2023-12-06 RX ORDER — GLYCOPYRROLATE 0.2 MG/ML
INJECTION, SOLUTION INTRAMUSCULAR; INTRAVENOUS AS NEEDED
Status: DISCONTINUED | OUTPATIENT
Start: 2023-12-06 | End: 2023-12-06 | Stop reason: SURG

## 2023-12-06 RX ORDER — HYDRALAZINE HYDROCHLORIDE 20 MG/ML
INJECTION INTRAMUSCULAR; INTRAVENOUS AS NEEDED
Status: DISCONTINUED | OUTPATIENT
Start: 2023-12-06 | End: 2023-12-06 | Stop reason: SURG

## 2023-12-06 RX ORDER — BUPIVACAINE HYDROCHLORIDE 2.5 MG/ML
INJECTION, SOLUTION EPIDURAL; INFILTRATION; INTRACAUDAL AS NEEDED
Status: DISCONTINUED | OUTPATIENT
Start: 2023-12-06 | End: 2023-12-06 | Stop reason: HOSPADM

## 2023-12-06 RX ORDER — SODIUM CHLORIDE, SODIUM LACTATE, POTASSIUM CHLORIDE, CALCIUM CHLORIDE 600; 310; 30; 20 MG/100ML; MG/100ML; MG/100ML; MG/100ML
INJECTION, SOLUTION INTRAVENOUS CONTINUOUS
Status: DISCONTINUED | OUTPATIENT
Start: 2023-12-06 | End: 2023-12-06

## 2023-12-06 RX ORDER — ONDANSETRON 2 MG/ML
INJECTION INTRAMUSCULAR; INTRAVENOUS
Status: COMPLETED
Start: 2023-12-06 | End: 2023-12-06

## 2023-12-06 RX ORDER — ONDANSETRON 2 MG/ML
4 INJECTION INTRAMUSCULAR; INTRAVENOUS EVERY 6 HOURS PRN
Status: DISCONTINUED | OUTPATIENT
Start: 2023-12-06 | End: 2023-12-08

## 2023-12-06 RX ORDER — POLYETHYLENE GLYCOL 3350 17 G/17G
17 POWDER, FOR SOLUTION ORAL DAILY PRN
Qty: 20 PACKET | Refills: 1 | Status: SHIPPED | OUTPATIENT
Start: 2023-12-06

## 2023-12-06 RX ORDER — ONDANSETRON 2 MG/ML
4 INJECTION INTRAMUSCULAR; INTRAVENOUS EVERY 6 HOURS PRN
Status: DISCONTINUED | OUTPATIENT
Start: 2023-12-06 | End: 2023-12-06 | Stop reason: HOSPADM

## 2023-12-06 RX ORDER — HYDROMORPHONE HYDROCHLORIDE 1 MG/ML
0.2 INJECTION, SOLUTION INTRAMUSCULAR; INTRAVENOUS; SUBCUTANEOUS EVERY 2 HOUR PRN
Status: DISCONTINUED | OUTPATIENT
Start: 2023-12-06 | End: 2023-12-08

## 2023-12-06 RX ORDER — SODIUM CHLORIDE 9 MG/ML
INJECTION, SOLUTION INTRAVENOUS CONTINUOUS
Status: DISCONTINUED | OUTPATIENT
Start: 2023-12-06 | End: 2023-12-07

## 2023-12-06 RX ORDER — HYDROCODONE BITARTRATE AND ACETAMINOPHEN 5; 325 MG/1; MG/1
1 TABLET ORAL EVERY 6 HOURS PRN
Qty: 10 TABLET | Refills: 0 | Status: SHIPPED | OUTPATIENT
Start: 2023-12-06

## 2023-12-06 RX ORDER — HYDROMORPHONE HYDROCHLORIDE 1 MG/ML
0.2 INJECTION, SOLUTION INTRAMUSCULAR; INTRAVENOUS; SUBCUTANEOUS EVERY 5 MIN PRN
Status: DISCONTINUED | OUTPATIENT
Start: 2023-12-06 | End: 2023-12-06 | Stop reason: HOSPADM

## 2023-12-06 RX ORDER — MIDAZOLAM HYDROCHLORIDE 1 MG/ML
INJECTION INTRAMUSCULAR; INTRAVENOUS AS NEEDED
Status: DISCONTINUED | OUTPATIENT
Start: 2023-12-06 | End: 2023-12-06 | Stop reason: SURG

## 2023-12-06 RX ORDER — FAMOTIDINE 20 MG/1
20 TABLET, FILM COATED ORAL 2 TIMES DAILY PRN
Status: DISCONTINUED | OUTPATIENT
Start: 2023-12-06 | End: 2023-12-08

## 2023-12-06 RX ORDER — NEOSTIGMINE METHYLSULFATE 1 MG/ML
INJECTION, SOLUTION INTRAVENOUS AS NEEDED
Status: DISCONTINUED | OUTPATIENT
Start: 2023-12-06 | End: 2023-12-06 | Stop reason: SURG

## 2023-12-06 RX ORDER — HYDROMORPHONE HYDROCHLORIDE 1 MG/ML
0.6 INJECTION, SOLUTION INTRAMUSCULAR; INTRAVENOUS; SUBCUTANEOUS EVERY 5 MIN PRN
Status: DISCONTINUED | OUTPATIENT
Start: 2023-12-06 | End: 2023-12-06 | Stop reason: HOSPADM

## 2023-12-06 RX ORDER — ONDANSETRON 4 MG/1
4 TABLET, FILM COATED ORAL EVERY 6 HOURS PRN
Status: DISCONTINUED | OUTPATIENT
Start: 2023-12-06 | End: 2023-12-08

## 2023-12-06 RX ORDER — OXYCODONE HYDROCHLORIDE 10 MG/1
10 TABLET ORAL EVERY 4 HOURS PRN
Status: DISCONTINUED | OUTPATIENT
Start: 2023-12-06 | End: 2023-12-08

## 2023-12-06 RX ORDER — NALOXONE HYDROCHLORIDE 0.4 MG/ML
0.08 INJECTION, SOLUTION INTRAMUSCULAR; INTRAVENOUS; SUBCUTANEOUS AS NEEDED
Status: DISCONTINUED | OUTPATIENT
Start: 2023-12-06 | End: 2023-12-06 | Stop reason: HOSPADM

## 2023-12-06 RX ORDER — HYDROMORPHONE HYDROCHLORIDE 1 MG/ML
0.4 INJECTION, SOLUTION INTRAMUSCULAR; INTRAVENOUS; SUBCUTANEOUS EVERY 5 MIN PRN
Status: DISCONTINUED | OUTPATIENT
Start: 2023-12-06 | End: 2023-12-06 | Stop reason: HOSPADM

## 2023-12-06 RX ORDER — HYDROCODONE BITARTRATE AND ACETAMINOPHEN 5; 325 MG/1; MG/1
1 TABLET ORAL ONCE AS NEEDED
Status: DISCONTINUED | OUTPATIENT
Start: 2023-12-06 | End: 2023-12-06 | Stop reason: HOSPADM

## 2023-12-06 RX ORDER — HYDROMORPHONE HYDROCHLORIDE 1 MG/ML
INJECTION, SOLUTION INTRAMUSCULAR; INTRAVENOUS; SUBCUTANEOUS
Status: COMPLETED
Start: 2023-12-06 | End: 2023-12-06

## 2023-12-06 RX ORDER — LIDOCAINE HYDROCHLORIDE 10 MG/ML
INJECTION, SOLUTION EPIDURAL; INFILTRATION; INTRACAUDAL; PERINEURAL AS NEEDED
Status: DISCONTINUED | OUTPATIENT
Start: 2023-12-06 | End: 2023-12-06 | Stop reason: SURG

## 2023-12-06 RX ORDER — CLONAZEPAM 1 MG/1
1 TABLET ORAL NIGHTLY PRN
Status: DISCONTINUED | OUTPATIENT
Start: 2023-12-06 | End: 2023-12-08

## 2023-12-06 RX ORDER — DIPHENHYDRAMINE HYDROCHLORIDE 50 MG/ML
12.5 INJECTION INTRAMUSCULAR; INTRAVENOUS AS NEEDED
Status: DISCONTINUED | OUTPATIENT
Start: 2023-12-06 | End: 2023-12-06 | Stop reason: HOSPADM

## 2023-12-06 RX ORDER — DIPHENHYDRAMINE HYDROCHLORIDE 50 MG/ML
12.5 INJECTION INTRAMUSCULAR; INTRAVENOUS EVERY 4 HOURS PRN
Status: DISCONTINUED | OUTPATIENT
Start: 2023-12-06 | End: 2023-12-07

## 2023-12-06 RX ORDER — HEPARIN SODIUM 5000 [USP'U]/ML
5000 INJECTION, SOLUTION INTRAVENOUS; SUBCUTANEOUS ONCE
Status: COMPLETED | OUTPATIENT
Start: 2023-12-06 | End: 2023-12-06

## 2023-12-06 RX ORDER — MIDAZOLAM HYDROCHLORIDE 1 MG/ML
1 INJECTION INTRAMUSCULAR; INTRAVENOUS EVERY 5 MIN PRN
Status: DISCONTINUED | OUTPATIENT
Start: 2023-12-06 | End: 2023-12-06 | Stop reason: HOSPADM

## 2023-12-06 RX ORDER — MEPERIDINE HYDROCHLORIDE 25 MG/ML
12.5 INJECTION INTRAMUSCULAR; INTRAVENOUS; SUBCUTANEOUS AS NEEDED
Status: DISCONTINUED | OUTPATIENT
Start: 2023-12-06 | End: 2023-12-06 | Stop reason: HOSPADM

## 2023-12-06 RX ORDER — CETIRIZINE HYDROCHLORIDE 5 MG/1
5 TABLET ORAL DAILY
Status: DISCONTINUED | OUTPATIENT
Start: 2023-12-06 | End: 2023-12-08

## 2023-12-06 RX ORDER — SENNOSIDES 8.6 MG
17.2 TABLET ORAL NIGHTLY PRN
Status: DISCONTINUED | OUTPATIENT
Start: 2023-12-06 | End: 2023-12-08

## 2023-12-06 RX ORDER — SODIUM CHLORIDE 9 MG/ML
INJECTION, SOLUTION INTRAVENOUS CONTINUOUS PRN
Status: DISCONTINUED | OUTPATIENT
Start: 2023-12-06 | End: 2023-12-06 | Stop reason: SURG

## 2023-12-06 RX ORDER — HYDROCODONE BITARTRATE AND ACETAMINOPHEN 5; 325 MG/1; MG/1
2 TABLET ORAL ONCE AS NEEDED
Status: DISCONTINUED | OUTPATIENT
Start: 2023-12-06 | End: 2023-12-06 | Stop reason: HOSPADM

## 2023-12-06 RX ORDER — ESMOLOL HYDROCHLORIDE 10 MG/ML
INJECTION INTRAVENOUS AS NEEDED
Status: DISCONTINUED | OUTPATIENT
Start: 2023-12-06 | End: 2023-12-06 | Stop reason: SURG

## 2023-12-06 RX ORDER — SODIUM CHLORIDE, SODIUM LACTATE, POTASSIUM CHLORIDE, CALCIUM CHLORIDE 600; 310; 30; 20 MG/100ML; MG/100ML; MG/100ML; MG/100ML
INJECTION, SOLUTION INTRAVENOUS CONTINUOUS
Status: DISCONTINUED | OUTPATIENT
Start: 2023-12-06 | End: 2023-12-06 | Stop reason: HOSPADM

## 2023-12-06 RX ORDER — ROCURONIUM BROMIDE 10 MG/ML
INJECTION, SOLUTION INTRAVENOUS AS NEEDED
Status: DISCONTINUED | OUTPATIENT
Start: 2023-12-06 | End: 2023-12-06 | Stop reason: SURG

## 2023-12-06 RX ORDER — NALOXONE HYDROCHLORIDE 0.4 MG/ML
0.08 INJECTION, SOLUTION INTRAMUSCULAR; INTRAVENOUS; SUBCUTANEOUS
Status: DISCONTINUED | OUTPATIENT
Start: 2023-12-06 | End: 2023-12-07

## 2023-12-06 RX ORDER — ACETAMINOPHEN 500 MG
1000 TABLET ORAL EVERY 8 HOURS SCHEDULED
Status: DISCONTINUED | OUTPATIENT
Start: 2023-12-06 | End: 2023-12-08

## 2023-12-06 RX ORDER — ACETAMINOPHEN 500 MG
1000 TABLET ORAL ONCE
Status: DISCONTINUED | OUTPATIENT
Start: 2023-12-06 | End: 2023-12-06 | Stop reason: HOSPADM

## 2023-12-06 RX ORDER — DEXTROSE MONOHYDRATE, SODIUM CHLORIDE, AND POTASSIUM CHLORIDE 50; 1.49; 4.5 G/1000ML; G/1000ML; G/1000ML
INJECTION, SOLUTION INTRAVENOUS CONTINUOUS
Status: DISCONTINUED | OUTPATIENT
Start: 2023-12-06 | End: 2023-12-08

## 2023-12-06 RX ORDER — KETAMINE HYDROCHLORIDE 50 MG/ML
INJECTION, SOLUTION, CONCENTRATE INTRAMUSCULAR; INTRAVENOUS AS NEEDED
Status: DISCONTINUED | OUTPATIENT
Start: 2023-12-06 | End: 2023-12-06 | Stop reason: SURG

## 2023-12-06 RX ORDER — LABETALOL HYDROCHLORIDE 5 MG/ML
5 INJECTION, SOLUTION INTRAVENOUS EVERY 5 MIN PRN
Status: DISCONTINUED | OUTPATIENT
Start: 2023-12-06 | End: 2023-12-06 | Stop reason: HOSPADM

## 2023-12-06 RX ORDER — LIDOCAINE HYDROCHLORIDE ANHYDROUS AND DEXTROSE MONOHYDRATE .8; 5 G/100ML; G/100ML
INJECTION, SOLUTION INTRAVENOUS CONTINUOUS PRN
Status: DISCONTINUED | OUTPATIENT
Start: 2023-12-06 | End: 2023-12-06 | Stop reason: SURG

## 2023-12-06 RX ORDER — PROCHLORPERAZINE EDISYLATE 5 MG/ML
5 INJECTION INTRAMUSCULAR; INTRAVENOUS EVERY 8 HOURS PRN
Status: DISCONTINUED | OUTPATIENT
Start: 2023-12-06 | End: 2023-12-06 | Stop reason: HOSPADM

## 2023-12-06 RX ORDER — HEPARIN SODIUM 5000 [USP'U]/ML
5000 INJECTION, SOLUTION INTRAVENOUS; SUBCUTANEOUS EVERY 8 HOURS SCHEDULED
Status: DISCONTINUED | OUTPATIENT
Start: 2023-12-06 | End: 2023-12-08

## 2023-12-06 RX ORDER — DEXAMETHASONE SODIUM PHOSPHATE 4 MG/ML
VIAL (ML) INJECTION AS NEEDED
Status: DISCONTINUED | OUTPATIENT
Start: 2023-12-06 | End: 2023-12-06 | Stop reason: SURG

## 2023-12-06 RX ORDER — ACETAMINOPHEN 500 MG
1000 TABLET ORAL ONCE AS NEEDED
Status: DISCONTINUED | OUTPATIENT
Start: 2023-12-06 | End: 2023-12-06 | Stop reason: HOSPADM

## 2023-12-06 RX ORDER — BISACODYL 10 MG
10 SUPPOSITORY, RECTAL RECTAL
Status: DISCONTINUED | OUTPATIENT
Start: 2023-12-06 | End: 2023-12-08

## 2023-12-06 RX ORDER — CEFAZOLIN SODIUM/WATER 2 G/20 ML
2 SYRINGE (ML) INTRAVENOUS ONCE
Status: COMPLETED | OUTPATIENT
Start: 2023-12-06 | End: 2023-12-06

## 2023-12-06 RX ORDER — CEFAZOLIN SODIUM/WATER 2 G/20 ML
2 SYRINGE (ML) INTRAVENOUS EVERY 8 HOURS
Qty: 40 ML | Refills: 0 | Status: COMPLETED | OUTPATIENT
Start: 2023-12-06 | End: 2023-12-07

## 2023-12-06 RX ORDER — ONDANSETRON 2 MG/ML
INJECTION INTRAMUSCULAR; INTRAVENOUS AS NEEDED
Status: DISCONTINUED | OUTPATIENT
Start: 2023-12-06 | End: 2023-12-06 | Stop reason: SURG

## 2023-12-06 RX ADMIN — LIDOCAINE HYDROCHLORIDE 80 MG: 10 INJECTION, SOLUTION EPIDURAL; INFILTRATION; INTRACAUDAL; PERINEURAL at 11:47:00

## 2023-12-06 RX ADMIN — ROCURONIUM BROMIDE 10 MG: 10 INJECTION, SOLUTION INTRAVENOUS at 13:24:00

## 2023-12-06 RX ADMIN — SODIUM CHLORIDE, SODIUM LACTATE, POTASSIUM CHLORIDE, CALCIUM CHLORIDE: 600; 310; 30; 20 INJECTION, SOLUTION INTRAVENOUS at 11:43:00

## 2023-12-06 RX ADMIN — HYDRALAZINE HYDROCHLORIDE 5 MG: 20 INJECTION INTRAMUSCULAR; INTRAVENOUS at 14:48:00

## 2023-12-06 RX ADMIN — SODIUM CHLORIDE: 9 INJECTION, SOLUTION INTRAVENOUS at 11:52:00

## 2023-12-06 RX ADMIN — DEXAMETHASONE SODIUM PHOSPHATE 8 MG: 4 MG/ML VIAL (ML) INJECTION at 12:10:00

## 2023-12-06 RX ADMIN — HYDRALAZINE HYDROCHLORIDE 5 MG: 20 INJECTION INTRAMUSCULAR; INTRAVENOUS at 14:38:00

## 2023-12-06 RX ADMIN — ROCURONIUM BROMIDE 50 MG: 10 INJECTION, SOLUTION INTRAVENOUS at 11:47:00

## 2023-12-06 RX ADMIN — MIDAZOLAM HYDROCHLORIDE 4 MG: 1 INJECTION INTRAMUSCULAR; INTRAVENOUS at 11:43:00

## 2023-12-06 RX ADMIN — CEFAZOLIN SODIUM/WATER 2 G: 2 G/20 ML SYRINGE (ML) INTRAVENOUS at 11:59:00

## 2023-12-06 RX ADMIN — ESMOLOL HYDROCHLORIDE 30 MG: 10 INJECTION INTRAVENOUS at 15:04:00

## 2023-12-06 RX ADMIN — KETAMINE HYDROCHLORIDE 25 MG: 50 INJECTION, SOLUTION, CONCENTRATE INTRAMUSCULAR; INTRAVENOUS at 12:10:00

## 2023-12-06 RX ADMIN — ONDANSETRON 4 MG: 2 INJECTION INTRAMUSCULAR; INTRAVENOUS at 14:26:00

## 2023-12-06 RX ADMIN — LIDOCAINE HYDROCHLORIDE ANHYDROUS AND DEXTROSE MONOHYDRATE 2 MG/KG/HR: .8; 5 INJECTION, SOLUTION INTRAVENOUS at 12:04:00

## 2023-12-06 RX ADMIN — ROCURONIUM BROMIDE 20 MG: 10 INJECTION, SOLUTION INTRAVENOUS at 12:20:00

## 2023-12-06 RX ADMIN — ACETAMINOPHEN 1000 MG: 10 INJECTION, SOLUTION INTRAVENOUS at 14:06:00

## 2023-12-06 RX ADMIN — SODIUM CHLORIDE, SODIUM LACTATE, POTASSIUM CHLORIDE, CALCIUM CHLORIDE: 600; 310; 30; 20 INJECTION, SOLUTION INTRAVENOUS at 14:10:00

## 2023-12-06 RX ADMIN — HYDRALAZINE HYDROCHLORIDE 5 MG: 20 INJECTION INTRAMUSCULAR; INTRAVENOUS at 14:57:00

## 2023-12-06 RX ADMIN — NEOSTIGMINE METHYLSULFATE 4 MG: 1 INJECTION, SOLUTION INTRAVENOUS at 15:02:00

## 2023-12-06 RX ADMIN — GLYCOPYRROLATE 0.6 MG: 0.2 INJECTION, SOLUTION INTRAMUSCULAR; INTRAVENOUS at 15:02:00

## 2023-12-06 NOTE — ANESTHESIA PROCEDURE NOTES
Airway  Date/Time: 12/6/2023 11:50 AM  Urgency: elective    Airway not difficult    General Information and Staff    Patient location during procedure: OR  Anesthesiologist: Loretta Saravia MD  Resident/CRNA: Jose F Ng CRNA  Performed: CRNA   Performed by: Jose F Ng CRNA  Authorized by: Loretta Saravia MD      Indications and Patient Condition  Indications for airway management: anesthesia  Spontaneous Ventilation: absent  Sedation level: deep  Preoxygenated: yes  Patient position: sniffing  Mask difficulty assessment: 1 - vent by mask    Final Airway Details  Final airway type: endotracheal airway      Successful airway: ETT  Cuffed: yes   Successful intubation technique: direct laryngoscopy  Endotracheal tube insertion site: oral  Blade: Madai  Blade size: #4  ETT size (mm): 7.5    Cormack-Lehane Classification: grade I - full view of glottis  Placement verified by: capnometry   Measured from: lips  ETT to lips (cm): 23  Number of attempts at approach: 1

## 2023-12-06 NOTE — ANESTHESIA PROCEDURE NOTES
Arterial Line    Date/Time: 12/6/2023 11:55 AM    Performed by: Jayda Vu CRNA  Authorized by: Alla Willis MD    General Information and Staff    Procedure Start:  12/6/2023 11:55 AM  Procedure End:  12/6/2023 12:00 PM  Anesthesiologist:  Alla Willis MD  CRNA:  Jayda Vu CRNA  Performed By:  CRNA  Patient Location:  OR  Indication: continuous blood pressure monitoring and blood sampling needed    Site Identification: surface landmarks    Preanesthetic Checklist: 2 patient identifiers, IV checked, risks and benefits discussed, monitors and equipment checked, pre-op evaluation, timeout performed, anesthesia consent and sterile technique used    Procedure Details    Catheter Size:  20 G  Catheter Length:  1 and 3/4 inch  Catheter Type:  Arrow  Seldinger Technique?: Yes    Laterality:  Right  Site:  Radial artery  Site Prep: chlorhexidine    Line Secured:  Wrist Brace, tape and Tegaderm    Assessment    Events: patient tolerated procedure well with no complications      Medications  12/6/2023 11:55 AM      Additional Comments

## 2023-12-06 NOTE — ANESTHESIA PROCEDURE NOTES
Peripheral IV  Date/Time: 12/6/2023 11:52 AM  Inserted by: Gerry Mckee MD    Placement  Needle size: 18 G  Laterality: right  Location: hand  Local anesthetic: none  Site prep: alcohol  Technique: anatomical landmarks  Attempts: 1

## 2023-12-06 NOTE — PROGRESS NOTES
NURSING ADMISSION NOTE      Patient admitted via  bed  Oriented to room. Safety precautions initiated. Bed in low position. Call light in reach.     Patient arrived from PACU in stable condition

## 2023-12-06 NOTE — OPERATIVE REPORT
Urology Operative Note    Attending Surgeon: Yousif Betancourt MD    Assistant Surgeon: Jimmy Carroll MD; Carmen Snellma    Patient Name: Sobeida Back    Date of Surgery: 12/6/2023    Preoperative Diagnosis: Renal mass    Postoperative Diagnosis: Same    Procedure Performed: Robot-assisted laparoscopic LEFT radical nephrectomy    Indication:  Patient is a 46year old male who presented with a 4.3 cm endophytic renal mass suspicious for RCC. The patient was counseled on options, risks, and benefits and elected to undergo the above procedure. We discussed risks including, but not limited to, bleeding, infection, damage to surrounding structures, need for repeat procedure(s). The patient understood these risks and wished to proceed with surgery. Findings:  Uncomplicated left nephrectomy. Adrenal gland spared. Procedure: The patient was taken to the operating room and a timeout was performed confirming the correct patient and procedure. The patient underwent general anesthesia with endotracheal tube. An orogastric tube was placed to suction. The patient was prepped and draped in partially flexed, relaxed flank position with the left side up. We ensured the patient was well padded, secured, had an axillary roll in place, and had no joint hyperextension. Pre-operative prophylactic antibiotics were given in the form of Cefazolin. Subcutaneous heparin was given in the pre-operative area. An orogastric tube was placed. The Veress needle was inserted into the abdomen and confirmed to be in the correct position with aspiration/irrigation of saline and low opening pressure. The abdomen was insufflated to 15 mmHg. The ports were marked out along the lateral rectus border approximately. First port was placed using an 8 mm robotic port and then the 30 degree robotic camera was inserted. There was no injury to surrounding structures within the abdomen.   The Veress needle was visualized and did not injure anything, so was then removed. The insufflation was switched to the port in place and the remaining ports were placed under direct visualization. Four 8 mm robotic ports were placed in an approximately vertical line along the lateral rectus border. A 12 mm air seal assistant port was placed near the midline between the superior-most 2 robotic ports. The robot was then docked. Instruments were attached including monopolar scissors in the right hand, fenestrated bipolar in the left hand, and tip up grasper in the lowermost assistant fourth arm. The colon was reflected medially by incising the white line of Toldt. Eventually the colon was medial enough so that the entire anterior surface of the kidney was exposed. At this point, a window was made lateral to the gonadal vein, but medial to the ureter and down to the psoas muscle posteriorly. Once this window was created the fourth arm was inserted to lift the kidney anteriorly and dissection behind the kidney was performed bluntly. Using this retraction I was able to free most of the attachments below the renal hilum. The lower edge and anterior surface of the renal vein were then dissected free. Behind the renal vein the renal artery was found, which I dissected free as well. I then freed the superior surface of the renal vein and made a window on both sides of the renal hilum. At this point the hilum was free. A 60 cm white vascular load Ethicon stapler was used to take the renal hilum en bloc. The remaining attachments around the hilum were taken and hemostasis was excellent. We then used a combination of blunt dissection and electrocautery to incise the lateral attachments and superior attachments of the kidney. The adrenal gland was visualized and  from the perinephric fat. The ureter was skeletonized and cut using Weck clips and scissors. At this point the kidney was free it was placed in an Endo Catch bag.   We decreased the insufflation pressure and surveyed for hemostasis, which was excellent. Surgicel was placed near the hilum, spleen, renal bed. The robot was then undocked. The second lowermost incision was extended to accommodate removal of the kidney. The kidney was removed and passed off the field for specimen. The fascia of the incision was closed with running 0 PDS. We then reinsufflated the abdomen and looked at the back of our closure which was excellent with no air leaks and no bowel or omentum tacked to the abdominal wall. We looked again in the surgical area and hemostasis remained excellent. We removed the air seal port and closed the fascia using a fascial closure device and 0 Vicryl suture. We then desufflated the abdomen and removed all the ports. Local anesthesia was given in the form of 60 mL 0.25% Marcaine to each incision prior to skin closure. The skin incisions were closed with subcuticular 4-0 Monocryl suture. Skin glue was applied over the incisions after patient was cleaned and dried. The patient was awoken from anesthesia and transferred to PACU in stable condition. The patient tolerated the procedure well. All instrument/supply counts were correct at the end of the case. Specimens:   Left kidney. Estimated Blood Loss:  25 mL    Tubes/Drains:  16 Croatian Boo catheter    Complications:   None immediate    Condition from OR:  Stable    Plan:   Admit for observation. Tracy Parker.  Nataliia Harris MD  Staff Urologist  [facility]  Office: [phone]

## 2023-12-07 LAB
ANION GAP SERPL CALC-SCNC: 7 MMOL/L (ref 0–18)
BUN BLD-MCNC: 14 MG/DL (ref 9–23)
CALCIUM BLD-MCNC: 8.9 MG/DL (ref 8.5–10.1)
CHLORIDE SERPL-SCNC: 104 MMOL/L (ref 98–112)
CO2 SERPL-SCNC: 25 MMOL/L (ref 21–32)
CREAT BLD-MCNC: 1.75 MG/DL
EGFRCR SERPLBLD CKD-EPI 2021: 47 ML/MIN/1.73M2 (ref 60–?)
ERYTHROCYTE [DISTWIDTH] IN BLOOD BY AUTOMATED COUNT: 12.7 %
GLUCOSE BLD-MCNC: 134 MG/DL (ref 70–99)
HCT VFR BLD AUTO: 40.5 %
HGB BLD-MCNC: 14.2 G/DL
MCH RBC QN AUTO: 31.8 PG (ref 26–34)
MCHC RBC AUTO-ENTMCNC: 35.1 G/DL (ref 31–37)
MCV RBC AUTO: 90.6 FL
OSMOLALITY SERPL CALC.SUM OF ELEC: 284 MOSM/KG (ref 275–295)
PLATELET # BLD AUTO: 270 10(3)UL (ref 150–450)
POTASSIUM SERPL-SCNC: 4 MMOL/L (ref 3.5–5.1)
RBC # BLD AUTO: 4.47 X10(6)UL
SODIUM SERPL-SCNC: 136 MMOL/L (ref 136–145)
WBC # BLD AUTO: 22.1 X10(3) UL (ref 4–11)

## 2023-12-07 PROCEDURE — 99254 IP/OBS CNSLTJ NEW/EST MOD 60: CPT | Performed by: INTERNAL MEDICINE

## 2023-12-07 RX ORDER — PROCHLORPERAZINE EDISYLATE 5 MG/ML
10 INJECTION INTRAMUSCULAR; INTRAVENOUS EVERY 6 HOURS PRN
Status: DISCONTINUED | OUTPATIENT
Start: 2023-12-07 | End: 2023-12-08

## 2023-12-07 RX ORDER — CALCIUM CARBONATE 500 MG/1
1000 TABLET, CHEWABLE ORAL EVERY 8 HOURS PRN
Status: DISCONTINUED | OUTPATIENT
Start: 2023-12-07 | End: 2023-12-08

## 2023-12-07 RX ORDER — LORAZEPAM 2 MG/ML
0.5 INJECTION INTRAMUSCULAR ONCE AS NEEDED
Status: COMPLETED | OUTPATIENT
Start: 2023-12-07 | End: 2023-12-07

## 2023-12-07 NOTE — PLAN OF CARE
Patient is drowsy coming from PACU. On room air. Abdomen is soft/ rounded. Patient c/o severe nausea upon arrival to the floor. Reglan given. Patient c/o severe pain. Requesting increase dose of dilaudid or pain medicine every hour. Page sent to Dr. Nehemiah Wahl. Received orders for PCA dilaudid and to closely monitor UO. Boo catheter intact draining yellow urine. Receiving IVF. Call light within reach.

## 2023-12-07 NOTE — PLAN OF CARE
Patient A&O x4. VSS on RA. Tele showing NSR. . Denies chest pain and SOB. Abdomen is soft and rounded. Reports severe post op nausea- PRN Zofran given. Hypoactive bowel sounds. Boo in place- draining clear, yellow urine. Strict I/O. Pain managed with Dilaudid PCA. IVF per order. Up w/ SBA. All questions and concerns addressed. Safety precautions in place. Call light within reach.

## 2023-12-08 VITALS
OXYGEN SATURATION: 95 % | TEMPERATURE: 98 F | HEIGHT: 73 IN | BODY MASS INDEX: 24.39 KG/M2 | SYSTOLIC BLOOD PRESSURE: 162 MMHG | RESPIRATION RATE: 18 BRPM | HEART RATE: 61 BPM | WEIGHT: 184 LBS | DIASTOLIC BLOOD PRESSURE: 98 MMHG

## 2023-12-08 LAB
ANION GAP SERPL CALC-SCNC: 3 MMOL/L (ref 0–18)
BUN BLD-MCNC: 15 MG/DL (ref 9–23)
CALCIUM BLD-MCNC: 9.1 MG/DL (ref 8.5–10.1)
CHLORIDE SERPL-SCNC: 107 MMOL/L (ref 98–112)
CO2 SERPL-SCNC: 29 MMOL/L (ref 21–32)
CREAT BLD-MCNC: 1.84 MG/DL
EGFRCR SERPLBLD CKD-EPI 2021: 44 ML/MIN/1.73M2 (ref 60–?)
ERYTHROCYTE [DISTWIDTH] IN BLOOD BY AUTOMATED COUNT: 13.1 %
GLUCOSE BLD-MCNC: 100 MG/DL (ref 70–99)
HCT VFR BLD AUTO: 43.3 %
HGB BLD-MCNC: 14.7 G/DL
MCH RBC QN AUTO: 31.5 PG (ref 26–34)
MCHC RBC AUTO-ENTMCNC: 33.9 G/DL (ref 31–37)
MCV RBC AUTO: 92.7 FL
OSMOLALITY SERPL CALC.SUM OF ELEC: 289 MOSM/KG (ref 275–295)
PLATELET # BLD AUTO: 289 10(3)UL (ref 150–450)
POTASSIUM SERPL-SCNC: 4 MMOL/L (ref 3.5–5.1)
RBC # BLD AUTO: 4.67 X10(6)UL
SODIUM SERPL-SCNC: 139 MMOL/L (ref 136–145)
WBC # BLD AUTO: 16.1 X10(3) UL (ref 4–11)

## 2023-12-08 PROCEDURE — 99232 SBSQ HOSP IP/OBS MODERATE 35: CPT | Performed by: STUDENT IN AN ORGANIZED HEALTH CARE EDUCATION/TRAINING PROGRAM

## 2023-12-08 RX ORDER — SIMETHICONE 80 MG
80 TABLET,CHEWABLE ORAL
Status: DISCONTINUED | OUTPATIENT
Start: 2023-12-08 | End: 2023-12-08

## 2023-12-08 NOTE — PROGRESS NOTES
Patient A&Ox4. Vital signs stable on room air. Pain 4/10; Pain medications declined at this time. Abdomen soft, nontender, nondistended. Bowel sounds present; active. Patient belching, not passing gas. Sennakot and mylicon provided. Denies nausea, vomiting, diarrhea. Incisions closed with skin glue, open to air, clean, dry and intact. Voiding well without pain or complication. Soft diet being tolerated well. Patient updated on plan of care. Questions and concerns discussed.

## 2023-12-08 NOTE — DISCHARGE SUMMARY
[facility]    Discharge Summary    Gregory Mustafa 73 Patient Status:  Inpatient    4/3/1975 MRN [id number]   [facility] 3NW-A Attending Grace Dixon MD   Hosp Day # 1 PCP Jessica Lo MD     Date of Admission: 2023 Disposition: Discharged home    Date of Discharge: 2023    Admitting Diagnosis: Renal mass [N28.89]  Renal mass    Discharge Diagnosis:   Patient Active Problem List   Diagnosis    GERD (gastroesophageal reflux disease)    Anxiety    BERTO (obstructive sleep apnea)    Eczema    Deviated septum    Positive TB test    Dyslipidemia    Unspecified internal derangement of knee    Allergic rhinitis    Obesity (BMI 30-39. 9)    Therapeutic drug monitoring    History of sleep apnea    Vitamin D deficiency    Sleep disorder    Primary insomnia    Chronic pain of left knee    Arthritis of right knee    Right inguinal hernia    Patellofemoral joint pain, left    Hyperglycemia    Intractable vomiting with nausea, unspecified vomiting type    Olecranon bursitis of left elbow    ETD (eustachian tube dysfunction)    Special screening for malignant neoplasms, colon    Colon polyps    Internal hemorrhoids    Diverticulosis of large intestine without diverticulitis    Renal mass       Reason for Admission: renal mass    Physical Exam: see progress note dated 23    History of Present Illness: 46year old male who presented with a 4.3 cm endophytic renal mass suspicious for RCC. The patient was counseled on options, risks, and benefits and elected to undergo the above procedure. We discussed risks including, but not limited to, bleeding, infection, damage to surrounding structures, need for repeat procedure(s). The patient understood these risks and wished to proceed with surgery.      Hospital Course: After informed consent was obtained for robot-assisted laparoscopic left radical nephrectomy, the patient was brought to the OR where aforementioned procedure was completed without any intraoperative complication. Patient was transferred from PACU to the floor without event. On the floor patient had significant nausea and pain that prompted PCA temporarily. He was transitioned off PCA within 24 hours and subsequently progressed as expected on the floor, tolerating advancement of diet with appropriate return of bowel function and control of pain with PO medications. His mehta was removed POD #2 and he is voiding freely. Restrictions and post op instructions were reviewed. Consultations: hospitalist    Procedures: robot-assisted laparoscopic left radical nephrectomy     Complications: none    Discharge Condition: Stable         Discharge Medications:      Discharge Medications        START taking these medications        Instructions Prescription details   HYDROcodone-acetaminophen 5-325 MG Tabs  Commonly known as: Norco      Take 1 tablet by mouth every 6 (six) hours as needed for Pain (For pain not controlled by tylenol or ibuprofen. ). This contains tylenol - do not exceed 4 g of tylenol daily. Quantity: 10 tablet  Refills: 0     Polyethylene Glycol 3350 17 g Pack  Commonly known as: MiraLax      Take 17 g by mouth daily as needed (Take to avoid constipation, especially if taking narcotic pain medications. ). Quantity: 20 packet  Refills: 1            CONTINUE taking these medications        Instructions Prescription details   cetirizine 5 MG Tabs  Commonly known as: ZyrTEC      Take 1 tablet (5 mg total) by mouth daily. Refills: 0     clonazePAM 1 MG Tabs  Commonly known as: KlonoPIN      Take 1 tablet (1 mg total) by mouth nightly as needed for Anxiety. Quantity: 30 tablet  Refills: 1     Multi-Vitamin Daily Tabs      Take 1 tablet by mouth daily.    Refills: 0               Where to Get Your Medications        These medications were sent to [address], [facility] [phone], [address], [address]      Phone: [phone]   HYDROcodone-acetaminophen 5-325 MG Tabs  Polyethylene Glycol 3350 17 g Pack         Follow up Visits: Follow-up with Dr. Daron Stone as scheduled. Future Appointments   Date Time Provider Jorge Alberto Melchor   12/18/2023 10:15 AM Trinity King MD [facility]   3/11/2024  9:30 AM Payton Mohr MD [facility]     Follow up Labs: BMP in 1 week    Other Discharge Instructions: no lifting >15 lbs. Repeat labs next week and follow up as scheduled.      Ranjith Godinez PA-C  [facility]

## 2023-12-08 NOTE — PROGRESS NOTES
NURSING DISCHARGE NOTE    Discharged Home via Wheelchair. Accompanied by RN and Spouse  Belongings Taken by patient/family. IVs removed - patient tolerated well. Discharge instructions provided. Questions and concerns discussed. Patient brought to [facility] in wheelchair with RN and spouse. All belongings collected from room and brought with spouse.

## 2023-12-08 NOTE — PROGRESS NOTES
Patient A&Ox4. Vital signs stable on room air. PCA pump discontinued. Pain 7/10- oxycodone provided with relief. Abdomen soft, tender. Bowel sounds present; hypoactive. Patient belching, not passing gas. . Incision closed with skin glue, open to air, clean, dry and intact. Full liquid diet being tolerated well. Boo catheter intact and draining clear yellow urine. Patient and wife updated on plan of care. Questions and concerns discussed.

## 2023-12-08 NOTE — PROGRESS NOTES
A&Ox4. VSS. RA.   GI: Abdomen soft, nondistended. Passing gas. Nausea controlled with zofran. : Boo removed at 0500. Due to Void  Pain controlled with PRN pain medications  Up with standby assist.  Incisions: 4 lap sites 1 transverse all with skin glue ariana  Diet: fulls, advance as tolerated  All appropriate safety measures in place. Patient updated on plan of care. All questions and concerns addressed.

## 2023-12-10 DIAGNOSIS — F51.01 PRIMARY INSOMNIA: ICD-10-CM

## 2023-12-11 ENCOUNTER — PATIENT MESSAGE (OUTPATIENT)
Dept: SURGERY | Facility: CLINIC | Age: 48
End: 2023-12-11

## 2023-12-11 ENCOUNTER — PATIENT OUTREACH (OUTPATIENT)
Dept: CASE MANAGEMENT | Age: 48
End: 2023-12-11

## 2023-12-11 RX ORDER — CLONAZEPAM 1 MG/1
1 TABLET ORAL NIGHTLY PRN
Qty: 30 TABLET | Refills: 1 | Status: SHIPPED | OUTPATIENT
Start: 2023-12-11

## 2023-12-12 RX ORDER — HYDROCODONE BITARTRATE AND ACETAMINOPHEN 5; 325 MG/1; MG/1
1 TABLET ORAL EVERY 6 HOURS PRN
Qty: 10 TABLET | Refills: 0 | Status: SHIPPED | OUTPATIENT
Start: 2023-12-12

## 2023-12-15 ENCOUNTER — LAB ENCOUNTER (OUTPATIENT)
Dept: LAB | Age: 48
End: 2023-12-15
Attending: PHYSICIAN ASSISTANT
Payer: COMMERCIAL

## 2023-12-15 DIAGNOSIS — R79.89 ELEVATED SERUM CREATININE: ICD-10-CM

## 2023-12-15 LAB
ANION GAP SERPL CALC-SCNC: 5 MMOL/L (ref 0–18)
BUN BLD-MCNC: 24 MG/DL (ref 9–23)
CALCIUM BLD-MCNC: 9.5 MG/DL (ref 8.5–10.1)
CHLORIDE SERPL-SCNC: 104 MMOL/L (ref 98–112)
CO2 SERPL-SCNC: 29 MMOL/L (ref 21–32)
CREAT BLD-MCNC: 1.69 MG/DL
EGFRCR SERPLBLD CKD-EPI 2021: 49 ML/MIN/1.73M2 (ref 60–?)
GLUCOSE BLD-MCNC: 123 MG/DL (ref 70–99)
OSMOLALITY SERPL CALC.SUM OF ELEC: 291 MOSM/KG (ref 275–295)
POTASSIUM SERPL-SCNC: 4.4 MMOL/L (ref 3.5–5.1)
SODIUM SERPL-SCNC: 138 MMOL/L (ref 136–145)

## 2023-12-15 PROCEDURE — 36415 COLL VENOUS BLD VENIPUNCTURE: CPT

## 2023-12-15 PROCEDURE — 80048 BASIC METABOLIC PNL TOTAL CA: CPT

## 2023-12-15 RX ORDER — HYDROCODONE BITARTRATE AND ACETAMINOPHEN 5; 325 MG/1; MG/1
1 TABLET ORAL EVERY 6 HOURS PRN
Qty: 10 TABLET | Refills: 0 | OUTPATIENT
Start: 2023-12-15

## 2023-12-18 ENCOUNTER — OFFICE VISIT (OUTPATIENT)
Dept: SURGERY | Facility: CLINIC | Age: 48
End: 2023-12-18

## 2023-12-18 DIAGNOSIS — C64.2 RENAL CELL CARCINOMA OF LEFT KIDNEY (HCC): Primary | ICD-10-CM

## 2023-12-18 PROCEDURE — 99024 POSTOP FOLLOW-UP VISIT: CPT | Performed by: SURGERY

## 2023-12-18 NOTE — PROGRESS NOTES
Urology Clinic Note    Primary Care Provider:  Chana Muhammad MD     Chief Complaint:   Follow-up after nephrectomy    HPI:   Edouard Cavanaugh is a 46year old male with history of anxiety, GERD, BERTO, OA referred for renal mass. Renal ultrasound on 9/22/2023 showed a 3 cm complex lesion in the left kidney. Follow-up MRI on 10/30/2023 showed a 4.3 cm partially solid, partially cystic complex enhancing endophytic interpolar renal mass. This mass was reviewed in  tumor board and consensus decision was to proceed with radical nephrectomy. I reviewed the images and agree with this finding as the mass is quite endophytic and central and appears suspicious for RCC on imaging. Chest x-ray on 11/3/2023 was normal.    I brought him to the OR on 12/6/2023 for robotic left radical nephrectomy. He did well postoperatively and was discharged on postoperative day 2. He is doing well at this time. His pain is well-controlled and he is not taking pain medications anymore. He is eating a normal diet and ambulating frequently. He has having normal bowel movements. 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 They are waiting on a few more stains to finalize report. Pre-operative creatinine is 1.25. Creatinine from 12/15/2023 was 1.69.     PSA:  Lab Results   Component Value Date    PSAS 0.64 03/20/2023    PSAS 0.62 07/09/2019        History:     Past Medical History:   Diagnosis Date    Abdominal hernia     Had surgery back in 2017 I believe    Abdominal pain Off and on for a few months    Anxiety 11/06/2012    Arthritis of right knee 08/24/2017    Belching     Bloating     Chest pain     Decorative tattoo     Deviated septum 08/02/2013    Chronic sinus     Dyslipidemia 09/06/2013    Eczema 04/25/2013    Flatulence/gas pain/belching     GERD (gastroesophageal reflux disease) EGD 8/19 11/06/2012    Headache disorder Dealt with migraines since I was a child    Heartburn     Diet dependant    Intractable vomiting with nausea, unspecified vomiting type 01/13/2019    Irregular bowel habits     BERTO (obstructive sleep apnea) 12/08/2012    Severe on cpap     PONV (postoperative nausea and vomiting)     Primary insomnia 08/02/2017    Right inguinal hernia 08/24/2017    Fat hernia    Sleep apnea     post surgical    Unspecified internal derangement of knee 10/17/2013    R      Vitamin D deficiency 05/05/2017    Vomiting     Wears glasses        Past Surgical History:   Procedure Laterality Date    CREATE EARDRUM OPENING,GEN ANESTH      HERNIA SURGERY Right 09/14/2017    OTHER      JAW WIRED SHUT  [address]    2014    [facility] ent    TONSILLECTOMY  2014    UPPER GI ENDOSCOPY PERFORMED  8/22/19= Hiatal hernia       Family History   Problem Relation Age of Onset    Cancer Sister         breast cancer    Cancer Paternal Grandmother         lung cancer    Breast Cancer Sister        Social History     Socioeconomic History    Marital status:    Tobacco Use    Smoking status: Former     Packs/day: 1.00     Years: 10.00     Additional pack years: 0.00     Total pack years: 10.00     Types: Cigarettes     Quit date: 8/1/2013     Years since quitting: 10.3    Smokeless tobacco: Never   Vaping Use    Vaping Use: Never used   Substance and Sexual Activity    Alcohol use: Not Currently     Comment: Infrequently    Drug use: Yes     Types: Cannabis     Comment: social     Social Determinants of Health     Food Insecurity: No Food Insecurity (12/6/2023)    Food Insecurity     Food Insecurity: Never true   Transportation Needs: No Transportation Needs (12/6/2023)    Transportation Needs     Lack of Transportation: No   Housing Stability: Low Risk  (12/6/2023)    Housing Stability     Housing Instability: No       Medications (Active prior to today's visit):  Current Outpatient Medications   Medication Sig Dispense Refill HYDROcodone-acetaminophen (NORCO) 5-325 MG Oral Tab Take 1 tablet by mouth every 6 (six) hours as needed for Pain (For pain not controlled by tylenol or ibuprofen. ). This contains tylenol - do not exceed 4 g of tylenol daily. 10 tablet 0    clonazePAM 1 MG Oral Tab Take 1 tablet (1 mg total) by mouth nightly as needed for Anxiety. 30 tablet 1    HYDROcodone-acetaminophen (NORCO) 5-325 MG Oral Tab Take 1 tablet by mouth every 6 (six) hours as needed for Pain (For pain not controlled by tylenol or ibuprofen. ). This contains tylenol - do not exceed 4 g of tylenol daily. 10 tablet 0    Polyethylene Glycol 3350 (MIRALAX) 17 g Oral Powd Pack Take 17 g by mouth daily as needed (Take to avoid constipation, especially if taking narcotic pain medications. ). 20 packet 1    cetirizine 5 MG Oral Tab Take 1 tablet (5 mg total) by mouth daily. Multiple Vitamin (MULTI-VITAMIN DAILY) Oral Tab Take 1 tablet by mouth daily. Allergies: Allergies   Allergen Reactions    Augmentin [Amoclan] NAUSEA AND VOMITING     VOMIT    Amoxicillin-Pot Clavulanate NAUSEA ONLY    Dust Mite Extract ITCHING     Eyes. Stuffy nose       Review of Systems:   A comprehensive 10-point review of systems was completed. Pertinent positives and negatives are noted in the the HPI. Physical Exam:   CONSTITUTIONAL: Well developed, well nourished, in no acute distress  NEUROLOGIC: Alert and oriented  HEAD: Normocephalic, atraumatic  EYES: Sclera non-icteric  ENT: Hearing intact, moist mucous membranes  NECK: No obvious goiter or masses  RESPIRATORY: Normal respiratory effort  SKIN: No evident rashes  ABDOMEN: Soft, non-tender, non-distended, laparoscopic incisions healing well    Assessment & Plan:   Rudi Johnson is a 46year old male with history of anxiety, GERD, BERTO, OA referred for renal mass. Renal ultrasound on 9/22/2023 showed a 3 cm complex lesion in the left kidney.   Follow-up MRI on 10/30/2023 showed a 4.3 cm partially solid, partially cystic complex enhancing endophytic interpolar renal mass. This mass was reviewed in  tumor board and consensus decision was to proceed with radical nephrectomy. I reviewed the images and agree with this finding as the mass is quite endophytic and central and appears suspicious for RCC on imaging. Chest x-ray on 11/3/2023 was normal.    I brought him to the OR on 12/6/2023 for robotic left radical nephrectomy. He did well postoperatively and was discharged on postoperative day 2. He is doing well at this time. His pain is well-controlled and he is not taking pain medications anymore. He is eating a normal diet and ambulating frequently. He has having normal bowel movements. 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 They are waiting on a few more stains to finalize report. Pre-operative creatinine is 1.25. Creatinine from 12/15/2023 was 1.69.    -Return to clinic in 3 months with CT scan prior  -No heavy lifting for 6 weeks after surgery  -Follow-up final surgical pathology report    Follow-Up After Partial or Radical Nephrectomy:  (Based on NCCN Guidelines)    Stage I:  Annual H&P  Labs as clinically indicated  Baseline CT or MRI (preferred) 3-12 months after surgery, then yearly up to 5 years (consider more if grade 3/4 or positive margins)  Chest X-ray yearly for 5 years (CT preferred if grde 3/4 or positive margins)    In total, 20 minutes were spent on this patient encounter (including chart review, patient history, physical, and counseling, documentation, and communication). Beni Chairez.  Quang Kern MD  Staff Urologist  [facility]  Office: [phone]

## 2023-12-20 ENCOUNTER — PATIENT MESSAGE (OUTPATIENT)
Dept: FAMILY MEDICINE CLINIC | Facility: CLINIC | Age: 48
End: 2023-12-20

## 2023-12-20 ENCOUNTER — TELEPHONE (OUTPATIENT)
Dept: SURGERY | Facility: CLINIC | Age: 48
End: 2023-12-20

## 2023-12-20 DIAGNOSIS — N52.9 ERECTILE DISORDER: Primary | ICD-10-CM

## 2023-12-20 RX ORDER — SILDENAFIL 100 MG/1
50 TABLET, FILM COATED ORAL
Qty: 30 TABLET | Refills: 2 | Status: SHIPPED | OUTPATIENT
Start: 2023-12-20

## 2023-12-20 NOTE — TELEPHONE ENCOUNTER
Called patient. Discussed trial of viagra but would wait 5 weeks from surgery. Discussed s/e profile. Will start with 50mg. No contraindications. I provided him with information regarding mens health specialists to see for his Peyronies. Discussed AUA guidelines to treat ED before Peyronies. ----- Message from Jaylyn Boateng LPN sent at 08/53/7101  2:42 PM CST -----  Regarding: FW: Kidney and Peyronie  Contact: [phone]    ----- Message -----  From: Grecia Perez  Sent: 12/20/2023  10:29 AM CST  To: [facility] Clinical Staff  Subject: Kidney and Peyronie                              Hi Dr. George Warren,    Feeling better each day. Got the pathology report back, and obviously relieved that we got on this quickly. Thank you for your assistance in making that happen, getting me taking care of so quickly and so well, as it probably saved my life. A lot of emotions, but again, doing better each day, and thankful that you all communicated with us so well, so thoroughly, that it allowed us to trust you and put our lives in your hands. I can't say how appreciative I am for all of it. Gear shift. ..    Chuy Morse, until I can see you about the other issue, is there anything you can prescribe that's relatively not riddled with side effects, that could help with ED? Remember I'm on one kidney now, lol. If so, let me know. Thanks again Doc and Happy Holidays!     Ye Crandall and Tanmay

## 2023-12-20 NOTE — TELEPHONE ENCOUNTER
From: Elaine Bence  To: Raven Cruz  Sent: 12/20/2023 10:33 AM CST  Subject: Kidney and Peyronie    Sarah He,    Well, kidney is out, along with the cancer that was attached to it. Not sure if you saw the reports, but looks like I hopefully dodged a bullet there. Anyway, I'm trying to focus on positive things, recovery, etc., but in the meantime, I'm also dealing with this Peyronie's thing, which has led to some ED. Was wondering if there's anything relatively safe you could prescribe to help, bearing in mind I'm operating on one kidney? Mikie Rothman as well, just because I figured both of you were busy and not sure which would be able to respond.     Thanks,  Loida Kaur

## 2023-12-22 ENCOUNTER — PATIENT MESSAGE (OUTPATIENT)
Dept: FAMILY MEDICINE CLINIC | Facility: CLINIC | Age: 48
End: 2023-12-22

## 2023-12-22 ENCOUNTER — PATIENT MESSAGE (OUTPATIENT)
Dept: SURGERY | Facility: CLINIC | Age: 48
End: 2023-12-22

## 2023-12-22 DIAGNOSIS — N52.9 ERECTILE DISORDER: ICD-10-CM

## 2023-12-22 NOTE — TELEPHONE ENCOUNTER
From: Rudi Johnson  To: Jessica Lo  Sent: 12/22/2023 2:36 PM CST  Subject: Royce Ours Dr Reji Bonilla,    Do you guys think it's wise that we get a pet scan done after this? Everybody's been asking me the question, and I kind of agree that I feel more comfortable if we took a total look after all this. Please advise.     Thanks,    Eduardo Rios

## 2023-12-26 RX ORDER — SILDENAFIL 100 MG/1
50 TABLET, FILM COATED ORAL
Qty: 30 TABLET | Refills: 2 | Status: SHIPPED | OUTPATIENT
Start: 2023-12-26

## 2023-12-27 ENCOUNTER — TELEPHONE (OUTPATIENT)
Dept: SURGERY | Facility: CLINIC | Age: 48
End: 2023-12-27

## 2023-12-27 DIAGNOSIS — C64.9 CARCINOMA OF KIDNEY, UNSPECIFIED LATERALITY (HCC): Primary | ICD-10-CM

## 2023-12-27 NOTE — TELEPHONE ENCOUNTER
Pt called with concerns after surgery that he wants a PET scan instead of CT for surveillance. I explained CT a better test for surveillance and PET scan usually only indicated if there is an abnormal CT finding. After discussion will continue with CT chest/abd/pelvis in 3 months.

## 2024-01-07 ENCOUNTER — PATIENT MESSAGE (OUTPATIENT)
Dept: SURGERY | Facility: CLINIC | Age: 49
End: 2024-01-07

## 2024-01-09 DIAGNOSIS — F51.01 PRIMARY INSOMNIA: ICD-10-CM

## 2024-01-09 RX ORDER — CLONAZEPAM 1 MG/1
1 TABLET ORAL NIGHTLY PRN
Qty: 30 TABLET | Refills: 1 | Status: SHIPPED | OUTPATIENT
Start: 2024-01-09

## 2024-02-16 ENCOUNTER — PATIENT MESSAGE (OUTPATIENT)
Dept: FAMILY MEDICINE CLINIC | Facility: CLINIC | Age: 49
End: 2024-02-16

## 2024-02-16 NOTE — TELEPHONE ENCOUNTER
From: Hugo Edwardsy  To: Pancho Domingo  Sent: 2/16/2024 1:26 PM CST  Subject: Nasty Cold Flu    Hi Dr. Domingo,    Hey, I've been dealing with some nasty cold or flu or covid for the last 2 days. Fever finally went away, but my ears are in so much pain. I was wondering if you could give me an antibiotic or if there's any way you could see me via video or something today as I see your first available appointment is not till Monday.    Thanks,     Hugo

## 2024-03-05 DIAGNOSIS — F51.01 PRIMARY INSOMNIA: ICD-10-CM

## 2024-03-05 RX ORDER — CLONAZEPAM 1 MG/1
1 TABLET ORAL NIGHTLY PRN
Qty: 30 TABLET | Refills: 0 | Status: SHIPPED | OUTPATIENT
Start: 2024-03-05

## 2024-03-05 NOTE — TELEPHONE ENCOUNTER
Requesting   clonazePAM 1 MG Oral Tab 30 tablet 1 1/9/2024 --    Sig - Route: Take 1 tablet (1 mg total) by mouth nightly as needed for Anxiety. - Oral    Sent to pharmacy as: clonazePAM 1 MG Oral Tablet (KlonoPIN)    E-Prescribing Status: Receipt confirmed by pharmacy (1/9/2024 10:26 AM CST)      LOV: 9/26/2023 -Dr. Domingo - f/u us and ear pain    Filled:     Dispensed Written Strength Quantity Refills Days Supply Provider Pharmacy    CLONAZEPAM 1 MG TABS 02/02/2024 01/09/2024 1 mg 30 tablet  30 Pancho Domingo MD [id number] (...       Future Appointments   Date Time Provider Department Center   3/6/2024  9:00 AM Hugo Cage APRN [facility] [address] [facility] [address]   3/11/2024  9:30 AM Rafal Elias MD [id number] [address]   3/27/2024  8:30 AM PF CT RM1 PF CT [place]   3/27/2024  9:00 AM PF CT RM1 PF CT [place]       Controlled Substance Medication [id number]/05/2024 02:31 PM    This medication is a controlled substance - forward to provider to refill           Rx pending

## 2024-03-06 ENCOUNTER — OFFICE VISIT (OUTPATIENT)
Dept: FAMILY MEDICINE CLINIC | Facility: CLINIC | Age: 49
End: 2024-03-06
Payer: COMMERCIAL

## 2024-03-06 VITALS
RESPIRATION RATE: 18 BRPM | DIASTOLIC BLOOD PRESSURE: 64 MMHG | OXYGEN SATURATION: 99 % | TEMPERATURE: 98 F | WEIGHT: 188.63 LBS | HEIGHT: 73 IN | SYSTOLIC BLOOD PRESSURE: 110 MMHG | HEART RATE: 76 BPM | BODY MASS INDEX: 25 KG/M2

## 2024-03-06 DIAGNOSIS — Z11.1 SCREENING FOR TUBERCULOSIS: Primary | ICD-10-CM

## 2024-03-06 DIAGNOSIS — F41.9 ANXIETY: ICD-10-CM

## 2024-03-06 PROCEDURE — 99213 OFFICE O/P EST LOW 20 MIN: CPT | Performed by: FAMILY MEDICINE

## 2024-03-06 PROCEDURE — 3074F SYST BP LT 130 MM HG: CPT | Performed by: FAMILY MEDICINE

## 2024-03-06 PROCEDURE — 86580 TB INTRADERMAL TEST: CPT | Performed by: FAMILY MEDICINE

## 2024-03-06 PROCEDURE — 3078F DIAST BP <80 MM HG: CPT | Performed by: FAMILY MEDICINE

## 2024-03-06 PROCEDURE — 3008F BODY MASS INDEX DOCD: CPT | Performed by: FAMILY MEDICINE

## 2024-03-06 NOTE — PROGRESS NOTES
Subjective:   Hugo Szymanski is a 48 year old male who presents for Employment Physical. 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 He denies any physical limitations or complaints. He does endorse increased anxiety surrounding his work and home life which has resulted in more frequent utilization of his PRN clonazepam.        History/Other:    Chief Complaint Reviewed and Verified  No Further Nursing Notes to   Review  Tobacco Reviewed  Allergies Reviewed  Medications Reviewed    Problem List Reviewed  Social History Reviewed         Tobacco:  He smoked tobacco in the past but quit greater than 12 months ago.  Social History    Tobacco Use      Smoking status: Former        Packs/day: 1.00        Years: 10.00        Additional pack years: 0.00        Total pack years: 10.00        Types: Cigarettes        Quit date: 8/1/2013        Years since quitting: 10.6      Smokeless tobacco: Never       Current Outpatient Medications   Medication Sig Dispense Refill    clonazePAM 1 MG Oral Tab Take 1 tablet (1 mg total) by mouth nightly as needed for Anxiety. 30 tablet 0    cetirizine 5 MG Oral Tab Take 1 tablet (5 mg total) by mouth daily.      Multiple Vitamin (MULTI-VITAMIN DAILY) Oral Tab Take 1 tablet by mouth daily.      Sildenafil Citrate 100 MG Oral Tab Take 0.5 tablets (50 mg total) by mouth daily as needed for Erectile Dysfunction. Take ~ one hour prior to sexual activity on an empty stomach (Patient not taking: Reported on 3/6/2024) 30 tablet 2    HYDROcodone-acetaminophen (NORCO) 5-325 MG Oral Tab Take 1 tablet by mouth every 6 (six) hours as needed for Pain (For pain not controlled by tylenol or ibuprofen.). This contains tylenol - do not exceed 4 g of tylenol daily. (Patient not taking: Reported on 3/6/2024) 10 tablet 0    HYDROcodone-acetaminophen (NORCO) 5-325 MG Oral Tab Take 1 tablet by mouth every 6 (six) hours as needed for Pain (For  pain not controlled by tylenol or ibuprofen.). This contains tylenol - do not exceed 4 g of tylenol daily. (Patient not taking: Reported on 3/6/2024) 10 tablet 0    Polyethylene Glycol 3350 (MIRALAX) 17 g Oral Powd Pack Take 17 g by mouth daily as needed (Take to avoid constipation, especially if taking narcotic pain medications.). (Patient not taking: Reported on 3/6/2024) 20 packet 1         Review of Systems:  Review of Systems   Constitutional: Negative.  Negative for activity change, appetite change, chills, diaphoresis, fatigue, fever and unexpected weight change.   HENT:  Negative for congestion, dental problem, ear pain, nosebleeds, postnasal drip, rhinorrhea, sinus pressure, sinus pain, sneezing, sore throat, tinnitus and voice change.    Eyes:  Negative for photophobia, pain, discharge, redness, itching and visual disturbance.   Respiratory:  Negative for apnea, cough, chest tightness, shortness of breath, wheezing and stridor.    Cardiovascular:  Negative for chest pain, palpitations and leg swelling.   Gastrointestinal:  Negative for abdominal distention, abdominal pain, blood in stool, constipation, diarrhea, nausea and vomiting.   Endocrine: Negative for polydipsia, polyphagia and polyuria.   Genitourinary:  Negative for difficulty urinating, dysuria, enuresis, flank pain, frequency, hematuria and urgency.   Musculoskeletal:  Negative for back pain, joint swelling, myalgias, neck pain and neck stiffness.   Skin:  Negative for color change, pallor and rash.   Neurological:  Negative for dizziness, tremors, syncope, facial asymmetry, speech difficulty, weakness, light-headedness, numbness and headaches.   Hematological:  Does not bruise/bleed easily.   Psychiatric/Behavioral:  Negative for behavioral problems, confusion, hallucinations and sleep disturbance.          Objective:   /64 (BP Location: Left arm, Patient Position: Sitting, Cuff Size: adult)   Pulse 76   Temp 98.2 °F (36.8 °C) (Oral)    Resp 18   Ht 6' 1\" (1.854 m)   Wt 188 lb 9.6 oz (85.5 kg)   SpO2 99%   BMI 24.88 kg/m²  Estimated body mass index is 24.88 kg/m² as calculated from the following:    Height as of this encounter: 6' 1\" (1.854 m).    Weight as of this encounter: 188 lb 9.6 oz (85.5 kg).  Physical Exam  Constitutional:       Appearance: He is well-developed.   Cardiovascular:      Rate and Rhythm: Normal rate and regular rhythm.      Heart sounds: Normal heart sounds.   Pulmonary:      Effort: Pulmonary effort is normal.      Breath sounds: Normal breath sounds.   Abdominal:      General: Bowel sounds are normal.      Palpations: Abdomen is soft.   Skin:     General: Skin is warm and dry.   Neurological:      Mental Status: He is alert and oriented to person, place, and time.      Deep Tendon Reflexes: Reflexes are normal and symmetric.           Assessment & Plan:   1. Screening for tuberculosis (Primary)  -     TB /PPD intradermal test  2. Anxiety   Denies use or abuse of ETOH or other substances   Discussed medication vs. Counseling. He would like to pursue therapy  before adding any medication, resources provided.               No follow-ups on file.    Hugo Cage, ANA, 3/6/2024, 9:35 AM

## 2024-03-08 ENCOUNTER — NURSE ONLY (OUTPATIENT)
Dept: FAMILY MEDICINE CLINIC | Facility: CLINIC | Age: 49
End: 2024-03-08
Payer: COMMERCIAL

## 2024-03-08 LAB — INDURATION (): 0 MM (ref 0–11)

## 2024-03-08 NOTE — PROGRESS NOTES
Hugo Szymanski presents today for nurse visit. TB read of the left forearm shows 0.0 mm induration. Paperwork filled and returned to patient. Patient tolerated well. Left office in stable condition.

## 2024-03-25 ENCOUNTER — PATIENT MESSAGE (OUTPATIENT)
Dept: FAMILY MEDICINE CLINIC | Facility: CLINIC | Age: 49
End: 2024-03-25

## 2024-03-25 NOTE — TELEPHONE ENCOUNTER
From: Hugo Castillo Feely  To: Pancho Domingo  Sent: 3/25/2024 8:46 AM CDT  Subject: Sinuses    Hello Dr. Domingo,    Hope all is well.     Got some follow-up imaging this week for the pelvis/abdomen and the Peyronies.     On another note, I've been waking up daily with sinus congestion that results in me trying to clear out thick yellow mucus all morning and then thin clear mucus throughout much of the day. I've been taking Allegra, but this is literally every morning.    Any thoughts?     Thanks,  Hugo

## 2024-03-26 ENCOUNTER — PATIENT MESSAGE (OUTPATIENT)
Dept: ADMINISTRATIVE | Age: 49
End: 2024-03-26

## 2024-03-26 ENCOUNTER — PATIENT MESSAGE (OUTPATIENT)
Dept: SURGERY | Facility: CLINIC | Age: 49
End: 2024-03-26

## 2024-03-26 NOTE — TELEPHONE ENCOUNTER
CT ABDOMEN+PELVIS(ALL W+WO)(CPT=74178)       Referral #: [id number]      Scheduled For: 02/27/2024    Status: pending authorization > To discuss this case with the reviewer, contact George at [phone]  .    Use Reference Case Number: [id number]      Notified clinical staff for support     Clinical notes sent for review.     Patient notified of pending status via [employer].     Appt Desk > Noted

## 2024-03-27 ENCOUNTER — HOSPITAL ENCOUNTER (OUTPATIENT)
Dept: CT IMAGING | Age: 49
Discharge: HOME | End: 2024-03-27
Attending: SURGERY
Payer: COMMERCIAL

## 2024-03-27 ENCOUNTER — HOSPITAL ENCOUNTER (OUTPATIENT)
Dept: CT IMAGING | Age: 49
End: 2024-03-27
Attending: SURGERY
Payer: COMMERCIAL

## 2024-03-27 ENCOUNTER — TELEPHONE (OUTPATIENT)
Dept: SURGERY | Facility: CLINIC | Age: 49
End: 2024-03-27

## 2024-03-27 DIAGNOSIS — C64.9 CARCINOMA OF KIDNEY, UNSPECIFIED LATERALITY (HCC): ICD-10-CM

## 2024-03-27 PROCEDURE — 71260 CT THORAX DX C+: CPT | Performed by: SURGERY

## 2024-03-27 NOTE — TELEPHONE ENCOUNTER
From: Hugo Castillo Feely  To: Nathan Curiel  Sent: 3/26/2024 4:47 PM CDT  Subject: Insurance Approval    Hello, I'm supposed to have a CT of my abdomen and my chest tomorrow. Of course I got a last-minute message about 20 minutes ago saying that this was not approved yet. I called [facility], they have approved the chest, but not the abdomen yet. And they're trying to get a hold of you guys.    Please advise.    Thanks,    Hugo

## 2024-03-27 NOTE — TELEPHONE ENCOUNTER
----- Message from Hugo Szymanski sent at 3/27/2024  8:21 AM CDT -----  Regarding: Hello?!?!?  Contact: [phone]  I am pulling into the hospital as we speak. I have approval for one test, not two. Apparently you nicky did not submit the paperwork to the insurance company until 3:20 p.m. yesterday, despite these appointments being on the books for quite some time.    My first appointment is at 8:30, in 10 minutes, that one is approved. The second one is at 9:00, and I suggest you nicky get on the horse and get it done because this is for a cancer follow up and I've been waiting for this for a couple of months.    Hugo

## 2024-03-27 NOTE — TELEPHONE ENCOUNTER
Bro  for status        CT ABDOMEN+PELVIS(ALL W+WO)(CPT=74178)         Referral #: [id number]       Scheduled For: 02/27/2024     Status: pending authorization > To discuss this case with the reviewer, contact George at [phone]  .     Use Reference Case Number: [id number]      Message routed to clinical staff for support

## 2024-03-27 NOTE — PROGRESS NOTES
Erlin Arias,    I have reviewed your test results. Good news, your CT chest is normal. No changes to the plan as we had previously discussed. Please let me know if you have any questions.    Thanks,  Nathan Curiel MD

## 2024-03-29 ENCOUNTER — HOSPITAL ENCOUNTER (OUTPATIENT)
Dept: CT IMAGING | Age: 49
Discharge: HOME | End: 2024-03-29
Attending: SURGERY
Payer: COMMERCIAL

## 2024-03-29 DIAGNOSIS — C64.2 RENAL CELL CARCINOMA OF LEFT KIDNEY (HCC): ICD-10-CM

## 2024-03-29 PROCEDURE — 74178 CT ABD&PLV WO CNTR FLWD CNTR: CPT | Performed by: SURGERY

## 2024-03-31 DIAGNOSIS — F51.01 PRIMARY INSOMNIA: ICD-10-CM

## 2024-03-31 RX ORDER — CLONAZEPAM 1 MG/1
1 TABLET ORAL NIGHTLY PRN
Qty: 30 TABLET | Refills: 0 | Status: SHIPPED | OUTPATIENT
Start: 2024-03-31

## 2024-04-08 NOTE — PROGRESS NOTES
Called patient regarding CT results.  There is a 9 mm soft tissue lesion lateral to the liver that is indeterminant.  Will review at  tumor board to decide if we can monitor this vs. perform IR biopsy.  I will see him back on 4/19/2024 to discuss.

## 2024-04-19 ENCOUNTER — OFFICE VISIT (OUTPATIENT)
Dept: SURGERY | Facility: CLINIC | Age: 49
End: 2024-04-19
Payer: COMMERCIAL

## 2024-04-19 DIAGNOSIS — N48.6 PEYRONIE'S DISEASE: ICD-10-CM

## 2024-04-19 DIAGNOSIS — C64.2 RENAL CELL CARCINOMA OF LEFT KIDNEY (HCC): Primary | ICD-10-CM

## 2024-04-19 LAB
APPEARANCE: CLEAR
BILIRUBIN: NEGATIVE
GLUCOSE (URINE DIPSTICK): NEGATIVE MG/DL
KETONES (URINE DIPSTICK): NEGATIVE MG/DL
LEUKOCYTES: NEGATIVE
MULTISTIX LOT#: NORMAL NUMERIC
NITRITE, URINE: NEGATIVE
OCCULT BLOOD: NEGATIVE
PH, URINE: 5.5 (ref 4.5–8)
PROTEIN (URINE DIPSTICK): NEGATIVE MG/DL
SPECIFIC GRAVITY: <=1.005 (ref 1–1.03)
URINE-COLOR: YELLOW
UROBILINOGEN,SEMI-QN: 0.2 MG/DL (ref 0–1.9)

## 2024-04-19 PROCEDURE — 81003 URINALYSIS AUTO W/O SCOPE: CPT | Performed by: SURGERY

## 2024-04-19 PROCEDURE — 99214 OFFICE O/P EST MOD 30 MIN: CPT | Performed by: SURGERY

## 2024-04-19 NOTE — PROGRESS NOTES
Urology Clinic Note    Primary Care Provider:  Pancho Domingo MD     Chief Complaint:   Follow-up after radical nephrectomy     HPI:   Hugo Szymanski is a 49 year old male with history of anxiety, GERD, BERTO, OA referred for renal mass.       Renal ultrasound on 9/22/2023 showed a 3 cm complex lesion in the left kidney.  Follow-up MRI on 10/30/2023 showed a 4.3 cm partially solid, partially cystic complex enhancing endophytic interpolar renal mass.  This mass was reviewed in  tumor board and consensus decision was to proceed with radical nephrectomy.  I reviewed the images and agree with this finding as the mass is quite endophytic and central and appears suspicious for RCC on imaging.  Chest x-ray on 11/3/2023 was normal.     I brought him to the OR on 12/6/2023 for robotic left radical nephrectomy.  He did well postoperatively and was discharged on postoperative day 2.  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     Postoperative CT chest on 3/27/2024 was normal.  Postoperative CT abdomen/pelvis on 3/29/2024 there is a 9 mm soft tissue lesion lateral to the liver that is indeterminant.     He is seeing Dr. Funes at [place] for Peyronie's disease and is being considered for possible Xiaflex injections.    PSA:  Lab Results   Component Value Date    PSAS 0.64 03/20/2023    PSAS 0.62 07/09/2019        History:     Past Medical History:    Abdominal hernia    Had surgery back in 2017 I believe    Abdominal pain    Anxiety    Arthritis of right knee    Belching    Bloating    Chest pain    Decorative tattoo    Deviated septum    Chronic sinus     Dyslipidemia    Eczema    Flatulence/gas pain/belching    GERD (gastroesophageal reflux disease)    11/06/2012    Headache disorder    Dealt with migraines since I was a child    Heartburn    Diet dependant    Intractable vomiting with nausea, unspecified vomiting type    Irregular bowel habits    BERTO (obstructive sleep apnea)     Severe on cpap     PONV (postoperative nausea and vomiting)    Primary insomnia    Right inguinal hernia    Fat hernia    Sleep apnea    post surgical    Unspecified internal derangement of knee    R      Vitamin D deficiency    Vomiting    Wears glasses       Past Surgical History:   Procedure Laterality Date    Create eardrum opening,gen anesth      Hernia surgery Right 09/14/2017    Other      JAW WIRED SHUT  1990S    Sinus surgery    2014    april ent    Tonsillectomy  2014    Upper gi endoscopy performed  8/22/19= Hiatal hernia       Family History   Problem Relation Age of Onset    Cancer Sister         breast cancer    Cancer Paternal Grandmother         lung cancer    Breast Cancer Sister        Social History     Socioeconomic History    Marital status:    Tobacco Use    Smoking status: Former     Current packs/day: 0.00     Average packs/day: 1 pack/day for 10.0 years (10.0 ttl pk-yrs)     Types: Cigarettes     Start date: 8/1/2003     Quit date: 8/1/2013     Years since quitting: 10.7    Smokeless tobacco: Never   Vaping Use    Vaping status: Never Used   Substance and Sexual Activity    Alcohol use: Not Currently     Comment: Infrequently    Drug use: Yes     Types: Cannabis     Comment: social     Social Determinants of Health     Food Insecurity: No Food Insecurity (12/6/2023)    Food Insecurity     Food Insecurity: Never true   Transportation Needs: No Transportation Needs (12/6/2023)    Transportation Needs     Lack of Transportation: No   Housing Stability: Low Risk  (1/30/2024)    [facility]    Housing Stability Vital Sign     Unable to Pay for Housing in the Last Year: No     Number of Places Lived in the Last Year: 1     In the last 12 months, was there a time when you did not have a steady place to sleep or slept in a shelter (including now)?: No       Medications (Active prior to today's visit):  Current Outpatient  Medications   Medication Sig Dispense Refill    clonazePAM 1 MG Oral Tab Take 1 tablet (1 mg total) by mouth nightly as needed for Anxiety. 30 tablet 0    Sildenafil Citrate 100 MG Oral Tab Take 0.5 tablets (50 mg total) by mouth daily as needed for Erectile Dysfunction. Take ~ one hour prior to sexual activity on an empty stomach (Patient not taking: Reported on 3/6/2024) 30 tablet 2    Polyethylene Glycol 3350 (MIRALAX) 17 g Oral Powd Pack Take 17 g by mouth daily as needed (Take to avoid constipation, especially if taking narcotic pain medications.). (Patient not taking: Reported on 3/6/2024) 20 packet 1    cetirizine 5 MG Oral Tab Take 1 tablet (5 mg total) by mouth daily.      Multiple Vitamin (MULTI-VITAMIN DAILY) Oral Tab Take 1 tablet by mouth daily.         Allergies:  Allergies   Allergen Reactions    Augmentin [Amoclan] NAUSEA AND VOMITING     VOMIT    Amoxicillin-Pot Clavulanate NAUSEA ONLY    Dust Mite Extract ITCHING     Eyes. Stuffy nose       Review of Systems:   A comprehensive 10-point review of systems was completed.  Pertinent positives and negatives are noted in the the HPI.    Physical Exam:   CONSTITUTIONAL: Well developed, well nourished, in no acute distress  NEUROLOGIC: Alert and oriented  HEAD: Normocephalic, atraumatic  EYES: Sclera non-icteric  ENT: Hearing intact, moist mucous membranes  NECK: No obvious goiter or masses  RESPIRATORY: Normal respiratory effort  SKIN: No evident rashes  ABDOMEN: Soft, non-tender, non-distended    Assessment & Plan:   Hugo Szymanski is a 49 year old male with history of anxiety, GERD, BERTO, OA referred for renal mass.       Renal ultrasound on 9/22/2023 showed a 3 cm complex lesion in the left kidney.  Follow-up MRI on 10/30/2023 showed a 4.3 cm partially solid, partially cystic complex enhancing endophytic interpolar renal mass.  This mass was reviewed in  tumor board and consensus decision was to proceed with radical nephrectomy.  I reviewed the  images and agree with this finding as the mass is quite endophytic and central and appears suspicious for RCC on imaging.  Chest x-ray on 11/3/2023 was normal.     I brought him to the OR on 12/6/2023 for robotic left radical nephrectomy.  He did well postoperatively and was discharged on postoperative day 2.  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     Postoperative CT chest on 3/27/2024 was normal.  Postoperative CT abdomen/pelvis on 3/29/2024 there is a 9 mm soft tissue lesion lateral to the liver that is indeterminant.  Reviewed in  tumor board today.  Consensus decision was to repeat CT imaging to evaluate any growth of this lesion in 3 months.  Currently too small to reliably biopsy.    He is seeing Dr. Funes at [place] for Peyronie's disease and is being considered for possible Xiaflex injections.      -Repeat CT abdomen/pelvis with and without contrast in 3 months      Follow-Up After Partial or Radical Nephrectomy:  (Based on NCCN Guidelines)    Stage I:  Annual H&P  Labs as clinically indicated  Baseline CT or MRI (preferred) 3-12 months after surgery, then yearly up to 5 years (consider more if grade 3/4 or positive margins)  Chest X-ray yearly for 5 years (CT preferred if grde 3/4 or positive margins)    In total, 30 minutes were spent on this patient encounter (including chart review, patient history, physical, and counseling, documentation, and communication).    Nathan Curiel MD  Staff Urologist  [facility]  Office: [phone]

## 2024-04-28 DIAGNOSIS — F51.01 PRIMARY INSOMNIA: ICD-10-CM

## 2024-04-28 RX ORDER — CLONAZEPAM 1 MG/1
1 TABLET ORAL NIGHTLY PRN
Qty: 30 TABLET | Refills: 0 | Status: SHIPPED | OUTPATIENT
Start: 2024-04-28

## 2024-05-29 DIAGNOSIS — F51.01 PRIMARY INSOMNIA: ICD-10-CM

## 2024-05-30 RX ORDER — CLONAZEPAM 1 MG/1
1 TABLET ORAL NIGHTLY PRN
Qty: 30 TABLET | Refills: 0 | Status: SHIPPED | OUTPATIENT
Start: 2024-05-30

## 2024-07-01 DIAGNOSIS — F51.01 PRIMARY INSOMNIA: ICD-10-CM

## 2024-07-01 RX ORDER — CLONAZEPAM 1 MG/1
1 TABLET ORAL NIGHTLY PRN
Qty: 30 TABLET | Refills: 0 | Status: SHIPPED | OUTPATIENT
Start: 2024-07-01

## 2024-07-01 NOTE — TELEPHONE ENCOUNTER
Requesting Clonazepam 1mg  Last OV: 3/6/24  RTC: not noted  Last Rx'd 5/30/24 #30 with 0 refills    Future Appointments   Date Time Provider Department Center   7/22/2024  9:30 AM Nathan Curiel MD [id number] Nap 4       [place] , last dispensed 5/30/24 #30    Controlled med:  Rx pended and routed for approval/denial

## 2024-07-18 ENCOUNTER — TELEPHONE (OUTPATIENT)
Dept: SURGERY | Facility: CLINIC | Age: 49
End: 2024-07-18

## 2024-07-18 NOTE — TELEPHONE ENCOUNTER
Patient calling to ask if he needs prior authorization from his insurance for his CT on 7/19. Patient states he has not spoken with his insurance company yet, but in the past he has needed prior authorizations for imaging and he wanted to double check. Patient states he will contact his insurance too. Please advise.

## 2024-07-18 NOTE — TELEPHONE ENCOUNTER
This encounter is now closed.     RN called wife/patient to update CT authorized already. No answer. Keeps on ringing. Note, patient ok to proceed with CT tomorrow,  7/19 as scheduled.     Referral Notes  Number of Notes: 3  .  Type Date User Summary Attachment   ANSI 278 Information 07/18/2024 10:41 AM Interface, Rte ANSI Authorization Response for [employer] [place] -   Note:  Referral was marked as Authorized     Procedure Information     02092 CPT®-CT ABDOMEN + PELVIS (W+WO) (SJK=[id number]) (Procedure Code)          Action code:  A1-Certified in total            Authorization code:  [id number]            Service from:  7/18/2024            Service to:  9/15/2024

## 2024-07-18 NOTE — TELEPHONE ENCOUNTER
Patient wife and stated patient does need a prior authorization for imaging scheduled tomorrow. Patient wife asking if that can be processed today so there are no complications tomorrow. Please advise.

## 2024-07-19 ENCOUNTER — HOSPITAL ENCOUNTER (OUTPATIENT)
Dept: CT IMAGING | Age: 49
Discharge: HOME | End: 2024-07-19
Attending: SURGERY
Payer: COMMERCIAL

## 2024-07-19 DIAGNOSIS — C64.2 RENAL CELL CARCINOMA OF LEFT KIDNEY (HCC): ICD-10-CM

## 2024-07-19 LAB
CREAT BLD-MCNC: 1.6 MG/DL
EGFRCR SERPLBLD CKD-EPI 2021: 52 ML/MIN/1.73M2 (ref 60–?)

## 2024-07-19 PROCEDURE — 82565 ASSAY OF CREATININE: CPT

## 2024-07-19 PROCEDURE — 74178 CT ABD&PLV WO CNTR FLWD CNTR: CPT | Performed by: SURGERY

## 2024-07-22 ENCOUNTER — OFFICE VISIT (OUTPATIENT)
Dept: SURGERY | Facility: CLINIC | Age: 49
End: 2024-07-22
Payer: COMMERCIAL

## 2024-07-22 DIAGNOSIS — C64.2 RENAL CELL CARCINOMA OF LEFT KIDNEY (HCC): Primary | ICD-10-CM

## 2024-07-22 LAB
APPEARANCE: CLEAR
BILIRUBIN: NEGATIVE
GLUCOSE (URINE DIPSTICK): NEGATIVE MG/DL
KETONES (URINE DIPSTICK): NEGATIVE MG/DL
LEUKOCYTES: NEGATIVE
MULTISTIX LOT#: NORMAL NUMERIC
NITRITE, URINE: NEGATIVE
OCCULT BLOOD: NEGATIVE
PH, URINE: 7.5 (ref 4.5–8)
PROTEIN (URINE DIPSTICK): NEGATIVE MG/DL
SPECIFIC GRAVITY: 1.01 (ref 1–1.03)
URINE-COLOR: YELLOW
UROBILINOGEN,SEMI-QN: 0.2 MG/DL (ref 0–1.9)

## 2024-07-22 PROCEDURE — 81003 URINALYSIS AUTO W/O SCOPE: CPT | Performed by: SURGERY

## 2024-07-22 PROCEDURE — 99214 OFFICE O/P EST MOD 30 MIN: CPT | Performed by: SURGERY

## 2024-07-22 NOTE — PROGRESS NOTES
Urology Clinic Note    Primary Care Provider:  Pancho Domingo MD     Chief Complaint:   Follow-up after radical nephrectomy     HPI:   Hugo Szymanski is a 49 year old male with history of anxiety, GERD, BERTO, OA referred for renal mass.       Renal ultrasound on 9/22/2023 showed a 3 cm complex lesion in the left kidney.  Follow-up MRI on 10/30/2023 showed a 4.3 cm partially solid, partially cystic complex enhancing endophytic interpolar renal mass.  This mass was reviewed in  tumor board and consensus decision was to proceed with radical nephrectomy.  I reviewed the images and agree with this finding as the mass is quite endophytic and central and appears suspicious for RCC on imaging.  Chest x-ray on 11/3/2023 was normal.     I brought him to the OR on 12/6/2023 for robotic left radical nephrectomy.  He did well postoperatively and was discharged on postoperative day 2.  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     Postoperative CT chest on 3/27/2024 was normal.  Postoperative CT abdomen/pelvis on 3/29/2024 there is a 9 mm soft tissue lesion lateral to the liver that is indeterminant.     Repeat CT 7/19/24 shows no growth in lesion lateral to liver (8 mm).    He is doing well at this time with no pain.  He is back in the gym and feeling well.  Incisions have healed well on exam.     He is seeing Dr. Funes at [place] for Peyronie's disease and was offered IPP with modeling given hourglass deformity.    AUA symptom score is 1/1 with LUTS.    Urinalysis: Negative    PSA:  Lab Results   Component Value Date    PSAS 0.64 03/20/2023    PSAS 0.62 07/09/2019        History:     Past Medical History:    Abdominal hernia    Had surgery back in 2017 I believe    Abdominal pain    Anxiety    Arthritis of right knee    Belching    Bloating    Chest pain    Decorative tattoo    Deviated septum    Chronic sinus     Dyslipidemia    Eczema    Flatulence/gas pain/belching    GERD  (gastroesophageal reflux disease)    11/06/2012    Headache disorder    Dealt with migraines since I was a child    Heartburn    Diet dependant    Intractable vomiting with nausea, unspecified vomiting type    Irregular bowel habits    BERTO (obstructive sleep apnea)    Severe on cpap     PONV (postoperative nausea and vomiting)    Primary insomnia    Right inguinal hernia    Fat hernia    Sleep apnea    post surgical    Unspecified internal derangement of knee    R      Vitamin D deficiency    Vomiting    Wears glasses       Past Surgical History:   Procedure Laterality Date    Create eardrum opening,gen anesth      Hernia surgery Right 09/14/2017    Other      JAW WIRED SHUT  1990S    [facility]    Tonsillectomy  2014    Upper gi endoscopy performed  8/22/19= Hiatal hernia       Family History   Problem Relation Age of Onset    Cancer Sister         breast cancer    Cancer Paternal Grandmother         lung cancer    Breast Cancer Sister        Social History     Socioeconomic History    Marital status:    Tobacco Use    Smoking status: Former     Current packs/day: 0.00     Average packs/day: 1 pack/day for 10.0 years (10.0 ttl pk-yrs)     Types: Cigarettes     Start date: 8/1/2003     Quit date: 8/1/2013     Years since quitting: 10.9    Smokeless tobacco: Never   Vaping Use    Vaping status: Never Used   Substance and Sexual Activity    Alcohol use: Not Currently     Comment: Infrequently    Drug use: Yes     Types: Cannabis     Comment: social     Social Determinants of Health     Financial Resource Strain: Patient Declined (4/22/2024)    Received from [facility]    Overall Financial Resource Strain (CARDIA)     Difficulty of Paying Living Expenses: Patient declined   Food Insecurity: Patient Declined (4/22/2024)    Received from [facility]    Hunger Vital Sign     Worried About Running Out of Food in the Last Year: Patient declined      Ran Out of Food in the Last Year: Patient declined   Transportation Needs: Patient Declined (4/22/2024)    Received from [facility]    PRAPARE - Transportation     Lack of Transportation (Medical): Patient declined     Lack of Transportation (Non-Medical): Patient declined   Housing Stability: Low Risk  (4/22/2024)    Received from [facility]    Housing Stability Vital Sign     Unable to Pay for Housing in the Last Year: No     Number of Places Lived in the Last Year: 1     In the last 12 months, was there a time when you did not have a steady place to sleep or slept in a shelter (including now)?: No       Medications (Active prior to today's visit):  Current Outpatient Medications   Medication Sig Dispense Refill    clonazePAM 1 MG Oral Tab Take 1 tablet (1 mg total) by mouth nightly as needed for Anxiety. 30 tablet 0    Sildenafil Citrate 100 MG Oral Tab Take 0.5 tablets (50 mg total) by mouth daily as needed for Erectile Dysfunction. Take ~ one hour prior to sexual activity on an empty stomach (Patient not taking: Reported on 3/6/2024) 30 tablet 2    Polyethylene Glycol 3350 (MIRALAX) 17 g Oral Powd Pack Take 17 g by mouth daily as needed (Take to avoid constipation, especially if taking narcotic pain medications.). (Patient not taking: Reported on 3/6/2024) 20 packet 1    cetirizine 5 MG Oral Tab Take 1 tablet (5 mg total) by mouth daily.      Multiple Vitamin (MULTI-VITAMIN DAILY) Oral Tab Take 1 tablet by mouth daily.         Allergies:  Allergies   Allergen Reactions    Augmentin [Amoclan] NAUSEA AND VOMITING     VOMIT    Amoxicillin-Pot Clavulanate NAUSEA ONLY    Dust Mite Extract ITCHING     Eyes. Stuffy nose       Review of Systems:   A comprehensive 10-point review of systems was completed.  Pertinent positives and negatives are noted in the the HPI.    Physical Exam:   CONSTITUTIONAL: Well developed, well nourished, in no acute distress  NEUROLOGIC: Alert and  oriented  HEAD: Normocephalic, atraumatic  EYES: Sclera non-icteric  ENT: Hearing intact, moist mucous membranes  NECK: No obvious goiter or masses  RESPIRATORY: Normal respiratory effort  SKIN: No evident rashes  ABDOMEN: Soft, non-tender, non-distended, well healed lap incisions    Assessment & Plan:   Hugo Szymanski is a 49 year old male with history of anxiety, GERD, BERTO, OA referred for renal mass.       Renal ultrasound on 9/22/2023 showed a 3 cm complex lesion in the left kidney.  Follow-up MRI on 10/30/2023 showed a 4.3 cm partially solid, partially cystic complex enhancing endophytic interpolar renal mass.  This mass was reviewed in  tumor board and consensus decision was to proceed with radical nephrectomy.  I reviewed the images and agree with this finding as the mass is quite endophytic and central and appears suspicious for RCC on imaging.  Chest x-ray on 11/3/2023 was normal.     I brought him to the OR on 12/6/2023 for robotic left radical nephrectomy.  He did well postoperatively and was discharged on postoperative day 2.  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     Postoperative CT chest on 3/27/2024 was normal.  Postoperative CT abdomen/pelvis on 3/29/2024 there is a 9 mm soft tissue lesion lateral to the liver that is indeterminant.     Repeat CT 7/19/24 shows no growth in lesion lateral to liver (8 mm).    He is doing well at this time with no pain.  He is back in the gym and feeling well.  Incisions have healed well on exam.     He is seeing Dr. Funes at [place] for Peyronie's disease and was offered IPP with modeling given hourglass deformity.    -Repeat CT in 6 months  -Chest x-ray in 6 months    In total, 30 minutes were spent on this patient encounter (including chart review, patient history, physical, and counseling, documentation, and communication).    Nathan Curiel MD  Staff Urologist  [facility]  Office:  [phone]

## 2024-07-28 DIAGNOSIS — F51.01 PRIMARY INSOMNIA: ICD-10-CM

## 2024-07-29 RX ORDER — CLONAZEPAM 1 MG/1
1 TABLET ORAL NIGHTLY PRN
Qty: 30 TABLET | Refills: 0 | Status: SHIPPED | OUTPATIENT
Start: 2024-07-29

## 2024-08-19 ENCOUNTER — PATIENT MESSAGE (OUTPATIENT)
Dept: FAMILY MEDICINE CLINIC | Facility: CLINIC | Age: 49
End: 2024-08-19

## 2024-08-20 RX ORDER — OMEPRAZOLE 40 MG/1
40 CAPSULE, DELAYED RELEASE ORAL DAILY
Qty: 90 CAPSULE | Refills: 0 | Status: SHIPPED | OUTPATIENT
Start: 2024-08-20 | End: 2025-08-15

## 2024-08-20 NOTE — TELEPHONE ENCOUNTER
Disp Refills Start End     Omeprazole 40 MG Oral Capsule Delayed Release 90 capsule 3 8/3/2022 7/29/2023    Sig - Route: Take 1 capsule (40 mg total) by mouth daily. - Oral    Sent to pharmacy as: Omeprazole 40 MG Oral Capsule Delayed Release    E-Prescribing Status: Receipt confirmed by pharmacy (8/3/2022  5:38 PM CDT)      Last OV 3/6/24  Future Appointments   Date Time Provider Department Center   1/23/2025 10:00 AM Nathan Curiel MD [id number] [facility]

## 2024-08-20 NOTE — TELEPHONE ENCOUNTER
From: Hugo Castillo Feely  To: Pancho Domingo  Sent: 8/19/2024 9:02 AM CDT  Subject: Omeprazole    Hi Dr. Domingo,    My stomach's been a little iffy lately and I went to go refill my Omeprazole and I don't see it on my chart anymore. Could you possibly send in a script for me?    I haven't filled it in a long time, so maybe it came off.    Thanks,    Hugo

## 2024-08-30 DIAGNOSIS — F51.01 PRIMARY INSOMNIA: ICD-10-CM

## 2024-08-30 NOTE — TELEPHONE ENCOUNTER
Requesting Clonazepam 1mg  Last OV: 3/6/24  RTC: prn  Last Rx'd 7/29/24 #30 with 0 refills    Future Appointments   Date Time Provider Department Center   1/23/2025 10:00 AM Nathan Curiel MD [id number] [facility]       Per [place] , last dispensed 7/30/24 #30    Controlled med:  Rx pended and routed for approval/denial

## 2024-08-31 RX ORDER — CLONAZEPAM 1 MG/1
1 TABLET ORAL NIGHTLY PRN
Qty: 30 TABLET | Refills: 0 | Status: SHIPPED | OUTPATIENT
Start: 2024-08-31

## 2024-09-30 DIAGNOSIS — F51.01 PRIMARY INSOMNIA: ICD-10-CM

## 2024-09-30 RX ORDER — CLONAZEPAM 1 MG/1
1 TABLET ORAL NIGHTLY PRN
Qty: 30 TABLET | Refills: 0 | Status: SHIPPED | OUTPATIENT
Start: 2024-09-30

## 2024-10-09 ENCOUNTER — TELEMEDICINE (OUTPATIENT)
Dept: FAMILY MEDICINE CLINIC | Facility: CLINIC | Age: 49
End: 2024-10-09
Payer: COMMERCIAL

## 2024-10-09 DIAGNOSIS — K30 UPSET STOMACH: Primary | ICD-10-CM

## 2024-10-09 DIAGNOSIS — E78.5 DYSLIPIDEMIA: ICD-10-CM

## 2024-10-09 DIAGNOSIS — Z12.5 SCREENING FOR MALIGNANT NEOPLASM OF PROSTATE: ICD-10-CM

## 2024-10-09 DIAGNOSIS — R53.83 OTHER FATIGUE: ICD-10-CM

## 2024-10-09 PROCEDURE — 99213 OFFICE O/P EST LOW 20 MIN: CPT | Performed by: FAMILY MEDICINE

## 2024-10-09 PROCEDURE — G2211 COMPLEX E/M VISIT ADD ON: HCPCS | Performed by: FAMILY MEDICINE

## 2024-10-09 NOTE — PROGRESS NOTES
Subjective:   Patient ID: Hugo Szymanski is a 49 year old male.    HPI  Mr Szymanski is a pleasant 49-year-old gentleman with history of dyslipidemia, anxiety, GERD, hiatal hernia, status post left nephrectomy, 2023 for renal cell carcinoma presenting for video visit today.  He has been having upset stomach for the past few days described further as having nausea and abdominal pain.  No fever no chills no vomiting no blood in the stool but has been noticing loose stools.  His mom has had similar symptoms also.  He does not feel that it is COVID.  However he has been feeling tired even prior and had noticed that his performance in the gym had declined.  This also goes with his libido and sexual performance.  He would like to have labs done.  He continues to follow-up with urology for recent nephrectomy for renal cell carcinoma. I had reviewed past medical and family histories together with allergy and medication lists documented.    History/Other:   Review of Systems   Constitutional:  Negative for chills and fever.   HENT:  Negative for congestion.    Respiratory:  Negative for cough and shortness of breath.    Cardiovascular:  Negative for chest pain.     Current Outpatient Medications   Medication Sig Dispense Refill    clonazePAM 1 MG Oral Tab Take 1 tablet (1 mg total) by mouth nightly as needed for Anxiety. 30 tablet 0    Omeprazole 40 MG Oral Capsule Delayed Release Take 1 capsule (40 mg total) by mouth daily. 90 capsule 0    Sildenafil Citrate 100 MG Oral Tab Take 0.5 tablets (50 mg total) by mouth daily as needed for Erectile Dysfunction. Take ~ one hour prior to sexual activity on an empty stomach (Patient not taking: Reported on 3/6/2024) 30 tablet 2    Polyethylene Glycol 3350 (MIRALAX) 17 g Oral Powd Pack Take 17 g by mouth daily as needed (Take to avoid constipation, especially if taking narcotic pain medications.). (Patient not taking: Reported on 3/6/2024) 20 packet 1    cetirizine 5 MG Oral Tab Take  1 tablet (5 mg total) by mouth daily.      Multiple Vitamin (MULTI-VITAMIN DAILY) Oral Tab Take 1 tablet by mouth daily.       Allergies:Allergies[1]    Objective:   Physical Exam  Constitutional:       General: He is not in acute distress.  Neurological:      Mental Status: He is alert.         Assessment & Plan:   1. Upset stomach   -Perhaps viral etiology  - Keep hydrated with water and electrolytes  - Continue omeprazole  - If no improvement or if condition worsens follow-up sooner or go to the emergency room   2. Other fatigue   -Unclear as to etiology but will order labs as below to see if there are organic reasons for him to be tired   3. Screening for malignant neoplasm of prostate   -Will check PSA level   4. Dyslipidemia   -We shall check lipid panel     We will notify him once we get test results and provide him recommendations if necessary.    Follow-up in the office for his next physical with she is due    This note was prepared using Dragon Medical voice recognition dictation software. As a result errors may occur. When identified these errors have been corrected. While every attempt is made to correct errors during dictation discrepancies may still exist          Orders Placed This Encounter   Procedures    CBC W Differential W Platelet [E]    Comp Metabolic Panel (14) [E]    TSH and Free T4 [E]    Lipid Panel [E]    Vitamin D [E]    Vitamin B12 [E]    PSA, Total (Screening) [E]    Testosterone, Total Free, Male [E]       Meds This Visit:  Requested Prescriptions      No prescriptions requested or ordered in this encounter       Imaging & Referrals:  None         [1]   Allergies  Allergen Reactions    Augmentin [Amoclan] NAUSEA AND VOMITING     VOMIT    Amoxicillin-Pot Clavulanate NAUSEA ONLY    Dust Mite Extract ITCHING     Eyes. Stuffy nose

## 2024-10-16 ENCOUNTER — PATIENT MESSAGE (OUTPATIENT)
Dept: FAMILY MEDICINE CLINIC | Facility: CLINIC | Age: 49
End: 2024-10-16

## 2024-10-16 RX ORDER — OMEPRAZOLE 40 MG/1
40 CAPSULE, DELAYED RELEASE ORAL DAILY
Qty: 90 CAPSULE | Refills: 0 | Status: SHIPPED | OUTPATIENT
Start: 2024-10-16 | End: 2025-10-11

## 2024-10-17 RX ORDER — AZITHROMYCIN 250 MG/1
TABLET, FILM COATED ORAL
Qty: 6 TABLET | Refills: 0 | Status: SHIPPED | OUTPATIENT
Start: 2024-10-17 | End: 2024-10-21

## 2024-10-30 DIAGNOSIS — F51.01 PRIMARY INSOMNIA: ICD-10-CM

## 2024-10-30 RX ORDER — OMEPRAZOLE 40 MG/1
40 CAPSULE, DELAYED RELEASE ORAL DAILY
Qty: 90 CAPSULE | Refills: 0 | Status: SHIPPED | OUTPATIENT
Start: 2024-10-30 | End: 2025-10-25

## 2024-10-30 RX ORDER — CLONAZEPAM 1 MG/1
1 TABLET ORAL NIGHTLY PRN
Qty: 30 TABLET | Refills: 0 | Status: SHIPPED | OUTPATIENT
Start: 2024-10-30

## 2024-11-08 PROBLEM — Z85.528 HISTORY OF RENAL CELL CARCINOMA: Status: ACTIVE | Noted: 2024-11-08

## 2024-11-11 ENCOUNTER — OFFICE VISIT (OUTPATIENT)
Dept: FAMILY MEDICINE CLINIC | Facility: CLINIC | Age: 49
End: 2024-11-11
Payer: COMMERCIAL

## 2024-11-11 VITALS
DIASTOLIC BLOOD PRESSURE: 70 MMHG | RESPIRATION RATE: 16 BRPM | OXYGEN SATURATION: 98 % | HEIGHT: 73 IN | HEART RATE: 64 BPM | WEIGHT: 187 LBS | BODY MASS INDEX: 24.78 KG/M2 | SYSTOLIC BLOOD PRESSURE: 112 MMHG | TEMPERATURE: 98 F

## 2024-11-11 DIAGNOSIS — G89.29 CHRONIC RIGHT HIP PAIN: Primary | ICD-10-CM

## 2024-11-11 DIAGNOSIS — M25.551 CHRONIC RIGHT HIP PAIN: Primary | ICD-10-CM

## 2024-11-11 PROCEDURE — 3078F DIAST BP <80 MM HG: CPT | Performed by: FAMILY MEDICINE

## 2024-11-11 PROCEDURE — 3074F SYST BP LT 130 MM HG: CPT | Performed by: FAMILY MEDICINE

## 2024-11-11 PROCEDURE — 3008F BODY MASS INDEX DOCD: CPT | Performed by: FAMILY MEDICINE

## 2024-11-11 PROCEDURE — 99213 OFFICE O/P EST LOW 20 MIN: CPT | Performed by: FAMILY MEDICINE

## 2024-11-11 PROCEDURE — G2211 COMPLEX E/M VISIT ADD ON: HCPCS | Performed by: FAMILY MEDICINE

## 2024-11-11 NOTE — PROGRESS NOTES
Subjective:   Patient ID: Hugo Szymanski is a 49 year old male.    HPI  Mr Szymanski is a pleasant 49-year-old gentleman with history of dyslipidemia, anxiety, GERD, hiatal hernia, status post left nephrectomy, 2023 for renal cell carcinoma presenting today for right hip pain for the past several months which seem to have worsened recently.  Pain is located on the lateral aspect of his right hip radiating to his right groin and the right buttock area.  This is also goes down to his right knee.  No recent injury or trauma that he knows of.  He has been limping because of pain.  Pain occurs intermittently and mild to moderate intensity.  Taking Tylenol but with not much improvement.    I had reviewed past medical and family histories together with allergy and medication lists documented.    History/Other:   Review of Systems   Musculoskeletal:  Positive for arthralgias. Negative for joint swelling and myalgias.   Neurological:  Negative for weakness and numbness.     Current Outpatient Medications   Medication Sig Dispense Refill    clonazePAM 1 MG Oral Tab Take 1 tablet (1 mg total) by mouth nightly as needed for Anxiety. 30 tablet 0    Omeprazole 40 MG Oral Capsule Delayed Release Take 1 capsule (40 mg total) by mouth daily. 90 capsule 0    cetirizine 5 MG Oral Tab Take 1 tablet (5 mg total) by mouth daily.      Multiple Vitamin (MULTI-VITAMIN DAILY) Oral Tab Take 1 tablet by mouth daily.       Allergies:Allergies[1]    Objective:   Physical Exam  Constitutional:       General: He is not in acute distress.  Musculoskeletal:      Right hip: Tenderness present. No bony tenderness or crepitus. Normal range of motion.        Legs:    Neurological:      Mental Status: He is alert.         Assessment & Plan:   1. Chronic right hip pain    -Musculoskeletal in nature  - Differentials include arthritis versus trochanteric bursitis versus muscular strain  - Continue Tylenol as needed  - Will order x-ray of the right hip  -  We will notify once we get test results provide recommendations  - Continue with heat as this has been helpful for him  - We will possibly refer to physical therapy and/or orthopedics    Call or come in sooner if symptoms worsen or persist    This note was prepared using Dragon Medical voice recognition dictation software. As a result errors may occur. When identified these errors have been corrected. While every attempt is made to correct errors during dictation discrepancies may still exist            No orders of the defined types were placed in this encounter.      Meds This Visit:  Requested Prescriptions      No prescriptions requested or ordered in this encounter       Imaging & Referrals:  OP REFERRAL TO [facility]  XR HIP + PELVIS MIN 4 VIEWS RIGHT (CPT=73503)         [1]   Allergies  Allergen Reactions    Augmentin [Amoclan] NAUSEA AND VOMITING     VOMIT    Amoxicillin-Pot Clavulanate NAUSEA ONLY    Dust Mite Extract ITCHING     Eyes. Stuffy nose

## 2024-11-11 NOTE — PATIENT INSTRUCTIONS
Thank you for choosing Pancho Domingo MD at [facility]  To Do: Hugo Szymanski  1. Please see below   Call [phone] to schedule the appointment.   Please signup for [employer], which is electronic access to your record if you have not done so.  All your results will post on there.  [email or web address]   You can NOW use WeGreek to book your appointments with us, or consider using open access scheduling which is through the [place] website [email or web address] and type in Pancho Domingo MD and follow the links for \"Schedule Online Now\"    To schedule Imaging or tests at [place] call Central Scheduling [phone], Go to [facility] A ER Building (For example: CT scans, X rays, Ultrasound, MRI)  Cardiac Testing in ER building Building A second floor Cardiac Testing [phone] (For example: Holter Monitor, Cardiac Stress tests,Event Monitor, or 2D Echocardiograms)  [facility] call [phone] usually in [facility] A  Walk in Clinic in [place] at [address] Mon-Fri at 8am-7:30 p.m., and Sat/Sun 9:00a.m.-4:30 p.m.  Also at [address] [place] [address]  Call [phone] for info     Please call our office about any questions regarding your treatment/medicines/tests as a result of today's visit.  For your safety, read the entire package insert of all medicines prescribed to you and be aware of all of the risks of treatment even beyond those discussed today.  All therapies have potential risk of harm or side effects or medication interactions.  It is your duty and for your safety to discuss with the pharmacist and our office with questions, and to notify us and stop treatment if problems arise, but know that our intention is that the benefits outweigh those potential risks and we strive to make you healthier and to improve your quality of life.    Referrals, and Radiology Information:    If your insurance requires a referral to a specialist, please allow 5 business days to  process your referral request.    If Pancho Domingo MD orders a CT or MRI, it may take up to 10 business days to receive approval from your insurance company. Once our office has called informing you that the insurance company approved your testing, please call Central Scheduling at [phone]  Please allow our office 5 business days to contact you regarding any testing results.    Refill policies:   Allow 3 business days for refills; controlled substances may take longer and must be picked up from the office in person.  Narcotic medications can only be filled in 30 day increments and must be refilled at an office visit only.  If your prescription is due for a refill, you may be due for a follow-up appointment.  We cannot refill your maintenance medications at a preventative wellness visit.  To best provide you care, patients receiving maintenance medications need to be seen at least twice a year.

## 2024-11-18 ENCOUNTER — HOSPITAL ENCOUNTER (OUTPATIENT)
Dept: GENERAL RADIOLOGY | Age: 49
Discharge: HOME | End: 2024-11-18
Attending: FAMILY MEDICINE
Payer: COMMERCIAL

## 2024-11-18 ENCOUNTER — HOSPITAL ENCOUNTER (OUTPATIENT)
Dept: CT IMAGING | Age: 49
Discharge: HOME | End: 2024-11-18
Attending: SURGERY
Payer: COMMERCIAL

## 2024-11-18 ENCOUNTER — LAB ENCOUNTER (OUTPATIENT)
Dept: LAB | Age: 49
End: 2024-11-18
Attending: FAMILY MEDICINE
Payer: COMMERCIAL

## 2024-11-18 DIAGNOSIS — Z12.5 SCREENING FOR MALIGNANT NEOPLASM OF PROSTATE: ICD-10-CM

## 2024-11-18 DIAGNOSIS — R53.83 OTHER FATIGUE: ICD-10-CM

## 2024-11-18 DIAGNOSIS — E78.5 DYSLIPIDEMIA: ICD-10-CM

## 2024-11-18 DIAGNOSIS — M25.551 CHRONIC RIGHT HIP PAIN: ICD-10-CM

## 2024-11-18 DIAGNOSIS — G89.29 CHRONIC RIGHT HIP PAIN: ICD-10-CM

## 2024-11-18 DIAGNOSIS — C64.2 RENAL CELL CARCINOMA OF LEFT KIDNEY (HCC): ICD-10-CM

## 2024-11-18 LAB
ALBUMIN SERPL-MCNC: 4.3 G/DL (ref 3.2–4.8)
ALBUMIN/GLOB SERPL: 2 {RATIO} (ref 1–2)
ALP LIVER SERPL-CCNC: 80 U/L
ALT SERPL-CCNC: 25 U/L
ANION GAP SERPL CALC-SCNC: 3 MMOL/L (ref 0–18)
AST SERPL-CCNC: 28 U/L (ref ?–34)
BASOPHILS # BLD AUTO: 0.04 X10(3) UL (ref 0–0.2)
BASOPHILS NFR BLD AUTO: 0.5 %
BILIRUB SERPL-MCNC: 0.2 MG/DL (ref 0.3–1.2)
BUN BLD-MCNC: 24 MG/DL (ref 9–23)
CALCIUM BLD-MCNC: 9.3 MG/DL (ref 8.7–10.4)
CHLORIDE SERPL-SCNC: 110 MMOL/L (ref 98–112)
CHOLEST SERPL-MCNC: 207 MG/DL (ref ?–200)
CO2 SERPL-SCNC: 29 MMOL/L (ref 21–32)
COMPLEXED PSA SERPL-MCNC: 0.62 NG/ML (ref ?–4)
CREAT BLD-MCNC: 1.47 MG/DL
CREAT BLD-MCNC: 1.5 MG/DL
EGFRCR SERPLBLD CKD-EPI 2021: 56 ML/MIN/1.73M2 (ref 60–?)
EGFRCR SERPLBLD CKD-EPI 2021: 57 ML/MIN/1.73M2 (ref 60–?)
EOSINOPHIL # BLD AUTO: 0.28 X10(3) UL (ref 0–0.7)
EOSINOPHIL NFR BLD AUTO: 3.7 %
ERYTHROCYTE [DISTWIDTH] IN BLOOD BY AUTOMATED COUNT: 13.2 %
FASTING PATIENT LIPID ANSWER: YES
FASTING STATUS PATIENT QL REPORTED: YES
GLOBULIN PLAS-MCNC: 2.2 G/DL (ref 2–3.5)
GLUCOSE BLD-MCNC: 84 MG/DL (ref 70–99)
HCT VFR BLD AUTO: 42.9 %
HDLC SERPL-MCNC: 35 MG/DL (ref 40–59)
HGB BLD-MCNC: 14.3 G/DL
IMM GRANULOCYTES # BLD AUTO: 0.02 X10(3) UL (ref 0–1)
IMM GRANULOCYTES NFR BLD: 0.3 %
LDLC SERPL CALC-MCNC: 147 MG/DL (ref ?–100)
LYMPHOCYTES # BLD AUTO: 2.39 X10(3) UL (ref 1–4)
LYMPHOCYTES NFR BLD AUTO: 31.6 %
MCH RBC QN AUTO: 31.5 PG (ref 26–34)
MCHC RBC AUTO-ENTMCNC: 33.3 G/DL (ref 31–37)
MCV RBC AUTO: 94.5 FL
MONOCYTES # BLD AUTO: 0.59 X10(3) UL (ref 0.1–1)
MONOCYTES NFR BLD AUTO: 7.8 %
NEUTROPHILS # BLD AUTO: 4.24 X10 (3) UL (ref 1.5–7.7)
NEUTROPHILS # BLD AUTO: 4.24 X10(3) UL (ref 1.5–7.7)
NEUTROPHILS NFR BLD AUTO: 56.1 %
NONHDLC SERPL-MCNC: 172 MG/DL (ref ?–130)
OSMOLALITY SERPL CALC.SUM OF ELEC: 297 MOSM/KG (ref 275–295)
PLATELET # BLD AUTO: 267 10(3)UL (ref 150–450)
POTASSIUM SERPL-SCNC: 4.2 MMOL/L (ref 3.5–5.1)
PROT SERPL-MCNC: 6.5 G/DL (ref 5.7–8.2)
RBC # BLD AUTO: 4.54 X10(6)UL
SODIUM SERPL-SCNC: 142 MMOL/L (ref 136–145)
T4 FREE SERPL-MCNC: 1.2 NG/DL (ref 0.8–1.7)
TRIGL SERPL-MCNC: 138 MG/DL (ref 30–149)
TSI SER-ACNC: 1.63 UIU/ML (ref 0.55–4.78)
VIT B12 SERPL-MCNC: 579 PG/ML (ref 211–911)
VIT D+METAB SERPL-MCNC: 47 NG/ML (ref 30–100)
VLDLC SERPL CALC-MCNC: 26 MG/DL (ref 0–30)
WBC # BLD AUTO: 7.6 X10(3) UL (ref 4–11)

## 2024-11-18 PROCEDURE — 80050 GENERAL HEALTH PANEL: CPT | Performed by: FAMILY MEDICINE

## 2024-11-18 PROCEDURE — 82607 VITAMIN B-12: CPT | Performed by: FAMILY MEDICINE

## 2024-11-18 PROCEDURE — 82565 ASSAY OF CREATININE: CPT

## 2024-11-18 PROCEDURE — 84402 ASSAY OF FREE TESTOSTERONE: CPT | Performed by: FAMILY MEDICINE

## 2024-11-18 PROCEDURE — 73502 X-RAY EXAM HIP UNI 2-3 VIEWS: CPT | Performed by: FAMILY MEDICINE

## 2024-11-18 PROCEDURE — 84403 ASSAY OF TOTAL TESTOSTERONE: CPT | Performed by: FAMILY MEDICINE

## 2024-11-18 PROCEDURE — 84153 ASSAY OF PSA TOTAL: CPT | Performed by: FAMILY MEDICINE

## 2024-11-18 PROCEDURE — 74170 CT ABD WO CNTRST FLWD CNTRST: CPT | Performed by: SURGERY

## 2024-11-18 PROCEDURE — 80061 LIPID PANEL: CPT | Performed by: FAMILY MEDICINE

## 2024-11-18 PROCEDURE — 84439 ASSAY OF FREE THYROXINE: CPT | Performed by: FAMILY MEDICINE

## 2024-11-18 PROCEDURE — 82306 VITAMIN D 25 HYDROXY: CPT | Performed by: FAMILY MEDICINE

## 2024-11-21 ENCOUNTER — TELEPHONE (OUTPATIENT)
Dept: SURGERY | Facility: CLINIC | Age: 49
End: 2024-11-21

## 2024-11-21 LAB
FREE TESTOST DIRECT: 9.9 PG/ML
TESTOSTERONE: 592 NG/DL

## 2024-11-21 NOTE — PROGRESS NOTES
Creatinine is mildly elevated.  This is actually better compared to previous.  Otherwise labs with no concerning values. Please notify patient.    -Dr. Domingo

## 2024-11-29 DIAGNOSIS — F51.01 PRIMARY INSOMNIA: ICD-10-CM

## 2024-11-30 DIAGNOSIS — F51.01 PRIMARY INSOMNIA: ICD-10-CM

## 2024-12-01 RX ORDER — CLONAZEPAM 1 MG/1
1 TABLET ORAL NIGHTLY PRN
Qty: 30 TABLET | Refills: 0 | Status: SHIPPED | OUTPATIENT
Start: 2024-12-01

## 2024-12-30 DIAGNOSIS — F51.01 PRIMARY INSOMNIA: ICD-10-CM

## 2024-12-30 RX ORDER — CLONAZEPAM 1 MG/1
1 TABLET ORAL NIGHTLY PRN
Qty: 30 TABLET | Refills: 0 | Status: SHIPPED | OUTPATIENT
Start: 2024-12-30

## 2025-01-15 ENCOUNTER — HOSPITAL ENCOUNTER (OUTPATIENT)
Dept: GENERAL RADIOLOGY | Age: 50
Discharge: HOME | End: 2025-01-15
Attending: SURGERY
Payer: COMMERCIAL

## 2025-01-15 DIAGNOSIS — C64.2 RENAL CELL CARCINOMA OF LEFT KIDNEY (HCC): ICD-10-CM

## 2025-01-15 PROCEDURE — 71046 X-RAY EXAM CHEST 2 VIEWS: CPT | Performed by: SURGERY

## 2025-01-15 RX ORDER — OMEPRAZOLE 40 MG/1
40 CAPSULE, DELAYED RELEASE ORAL DAILY
Qty: 90 CAPSULE | Refills: 0 | Status: SHIPPED | OUTPATIENT
Start: 2025-01-15 | End: 2026-01-10

## 2025-01-16 NOTE — PROGRESS NOTES
Normal CXR.    Future Appointments  1/23/2025  10:00 AM   Nathan Curiel MD           [id number]            [facility]

## 2025-01-23 ENCOUNTER — TELEPHONE (OUTPATIENT)
Dept: SURGERY | Facility: CLINIC | Age: 50
End: 2025-01-23

## 2025-01-23 ENCOUNTER — VIRTUAL PHONE E/M (OUTPATIENT)
Dept: SURGERY | Facility: CLINIC | Age: 50
End: 2025-01-23
Payer: COMMERCIAL

## 2025-01-23 DIAGNOSIS — C64.2 MALIGNANT NEOPLASM OF LEFT KIDNEY (HCC): Primary | ICD-10-CM

## 2025-01-23 PROCEDURE — 98013 SYNCH AUDIO-ONLY EST LOW 20: CPT | Performed by: SURGERY

## 2025-01-23 NOTE — PROGRESS NOTES
Urology Clinic Note  Telemedicine Visit  Audio Only  The patient consented to performing this visit virtually. Unable to use video.     Primary Care Provider:  Pancho Domingo MD     Chief Complaint:   RCC    HPI & Assessment:   Hugo Szymanski is a 49 year old male with history of anxiety, GERD, BERTO, OA referred for renal mass.       Renal ultrasound on 9/22/2023 showed a 3 cm complex lesion in the left kidney.  Follow-up MRI on 10/30/2023 showed a 4.3 cm partially solid, partially cystic complex enhancing endophytic interpolar renal mass.  This mass was reviewed in  tumor board and consensus decision was to proceed with radical nephrectomy.  I reviewed the images and agree with this finding as the mass is quite endophytic and central and appears suspicious for RCC on imaging.  Chest x-ray on 11/3/2023 was normal.     I brought him to the OR on 12/6/2023 for robotic left radical nephrectomy.  He did well postoperatively and was discharged on postoperative day 2.  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     Postoperative CT chest on 3/27/2024 was normal.  Postoperative CT abdomen/pelvis on 3/29/2024 there is a 9 mm soft tissue lesion lateral to the liver that is indeterminant.      Repeat CT 7/19/24 shows no growth in lesion lateral to liver (8 mm).     CT 11/18/2024 shows no signs of recurrence, stable liver lesions and right renal cyst, stable 8 mm lesion lateral to liver.  Chest x-ray 1/15/2025 was normal.     He is seeing Dr. Funes at [facility] for Peyronie's disease and was offered IPP with modeling given hourglass deformity.    Follow-Up After Partial or Radical Nephrectomy:  (Based on NCCN Guidelines)    Stage I:  Annual H&P  Labs as clinically indicated  Baseline CT or MRI (preferred) 3-12 months after surgery, then yearly up to 5 years (consider more if grade 3/4 or positive margins)  Chest X-ray yearly for 5 years (CT preferred if grde 3/4 or positive  margins)    Plan:   -CT and chest x-ray in 1 year (2025)  -Return to clinic in 1 year or sooner as needed       PSA:  Lab Results   Component Value Date    PSAS 0.62 2024    PSAS 0.64 2023    PSAS 0.62 2019        History:     Past Medical History:    Abdominal hernia    Had surgery back in  I believe    Abdominal pain    Anxiety    Arthritis of right knee    Belching    Bloating    Chest pain    Decorative tattoo    Deviated septum    Chronic sinus     Dyslipidemia    Eczema    Flatulence/gas pain/belching    GERD (gastroesophageal reflux disease)    2012    Headache disorder    Dealt with migraines since I was a child    Heartburn    Diet dependant    Intractable vomiting with nausea, unspecified vomiting type    Irregular bowel habits    BERTO (obstructive sleep apnea)    Severe on cpap     PONV (postoperative nausea and vomiting)    Primary insomnia    Right inguinal hernia    Fat hernia    Sleep apnea    post surgical    Unspecified internal derangement of knee    R      Vitamin D deficiency    Vomiting    Wears glasses       Past Surgical History:   Procedure Laterality Date    Create eardrum opening,gen anesth      Hernia surgery Right 2017    Other      JAW WIRED SHUT  1990S    Sinus surgery        april ent    Tonsillectomy  2014    Upper gi endoscopy performed  19= Hiatal hernia       Family History   Problem Relation Age of Onset    Cancer Sister         breast cancer    Cancer Paternal Grandmother         lung cancer    Breast Cancer Sister        Social History     Socioeconomic History    Marital status:    Tobacco Use    Smoking status: Former     Current packs/day: 0.00     Average packs/day: 1 pack/day for 10.0 years (10.0 ttl pk-yrs)     Types: Cigarettes     Start date: 2003     Quit date: 2013     Years since quittin.4    Smokeless tobacco: Never   Vaping Use    Vaping status: Never Used   Substance and Sexual Activity    Alcohol  use: Not Currently     Comment: Infrequently    Drug use: Yes     Types: Cannabis     Comment: social     Social Drivers of Health     Financial Resource Strain: Patient Declined (4/22/2024)    Received from [facility]    Overall Financial Resource Strain (CARDIA)     Difficulty of Paying Living Expenses: Patient declined   Food Insecurity: Patient Declined (4/22/2024)    Received from [facility]    Hunger Vital Sign     Worried About Running Out of Food in the Last Year: Patient declined     Ran Out of Food in the Last Year: Patient declined   Transportation Needs: Patient Declined (4/22/2024)    Received from [facility]    PRAPARE - Transportation     Lack of Transportation (Medical): Patient declined     Lack of Transportation (Non-Medical): Patient declined   Housing Stability: Low Risk  (4/22/2024)    Received from [facility]    Housing Stability Vital Sign     Unable to Pay for Housing in the Last Year: No     Number of Places Lived in the Last Year: 1     Unstable Housing in the Last Year: No       Medications (Active prior to today's visit):  Current Outpatient Medications   Medication Sig Dispense Refill    Omeprazole 40 MG Oral Capsule Delayed Release Take 1 capsule (40 mg total) by mouth daily. 90 capsule 0    clonazePAM 1 MG Oral Tab Take 1 tablet (1 mg total) by mouth nightly as needed for Anxiety. 30 tablet 0    clonazePAM 1 MG Oral Tab Take 1 tablet (1 mg total) by mouth nightly as needed for Anxiety. 30 tablet 0    cetirizine 5 MG Oral Tab Take 1 tablet (5 mg total) by mouth daily.      Multiple Vitamin (MULTI-VITAMIN DAILY) Oral Tab Take 1 tablet by mouth daily.         Allergies:  Allergies[1]    Review of Systems:   A comprehensive 10-point review of systems was completed.  Pertinent positives and negatives are noted in the the HPI.    Physical Exam:   No physical exam performed during this telephone  encounter.      In total, 20 minutes were spent on this patient encounter (including chart review, patient history, physical, counseling, documentation, and communication).      Nathan Curiel MD  Staff Urologist  [facility]  Office: [phone]             [1]   Allergies  Allergen Reactions    Augmentin [Amoclan] NAUSEA AND VOMITING     VOMIT    Amoxicillin-Pot Clavulanate NAUSEA ONLY    Dust Mite Extract ITCHING     Eyes. Stuffy nose

## 2025-01-23 NOTE — TELEPHONE ENCOUNTER
Per patient feeling sick asking if appointment scheduled for today could be changed to virtual appointment? Please advise thank you.

## 2025-01-23 NOTE — TELEPHONE ENCOUNTER
Ok to change to phone visit per Dr Curiel.  Appt changed.  Tried to contact patient to inform. Left detailed vm.

## 2025-02-05 DIAGNOSIS — F51.01 PRIMARY INSOMNIA: ICD-10-CM

## 2025-02-06 RX ORDER — CLONAZEPAM 1 MG/1
1 TABLET ORAL NIGHTLY PRN
Qty: 30 TABLET | Refills: 0 | Status: SHIPPED | OUTPATIENT
Start: 2025-02-06

## 2025-02-24 ENCOUNTER — TELEMEDICINE (OUTPATIENT)
Dept: FAMILY MEDICINE CLINIC | Facility: CLINIC | Age: 50
End: 2025-02-24
Payer: COMMERCIAL

## 2025-02-24 DIAGNOSIS — K44.9 HIATAL HERNIA: ICD-10-CM

## 2025-02-24 DIAGNOSIS — G44.209 TENSION HEADACHE: Primary | ICD-10-CM

## 2025-02-24 NOTE — PROGRESS NOTES
Subjective:   Patient ID: Hugo Szymanski is a 49 year old male.    HPI  Mr. Szymanski is a pleasant 49-year-old gentleman with history of dyslipidemia, anxiety, GERD, hiatal hernia presenting today for video visit.  He has been experiencing initially nausea and vomiting which started this past Friday associated with abdominal spasms after he drank sips of coffee.  He felt better the next day but had severe headaches located on the frontal aspect.  This was associate with congestion.  Nausea and vomiting started again.  He has been feeling tired.  No cough no shortness of breath no vomiting.  He has been taking omeprazole to help manage his hiatal hernia symptoms.    I had reviewed past medical and family histories together with allergy and medication lists documented.    History/Other:   Review of Systems   Constitutional:  Negative for fever.   Eyes:  Negative for visual disturbance.   Gastrointestinal:  Negative for constipation and diarrhea.     Current Outpatient Medications   Medication Sig Dispense Refill    clonazePAM 1 MG Oral Tab Take 1 tablet (1 mg total) by mouth nightly as needed for Anxiety. 30 tablet 0    Omeprazole 40 MG Oral Capsule Delayed Release Take 1 capsule (40 mg total) by mouth daily. 90 capsule 0    clonazePAM 1 MG Oral Tab Take 1 tablet (1 mg total) by mouth nightly as needed for Anxiety. 30 tablet 0    cetirizine 5 MG Oral Tab Take 1 tablet (5 mg total) by mouth daily.      Multiple Vitamin (MULTI-VITAMIN DAILY) Oral Tab Take 1 tablet by mouth daily.       Allergies:Allergies[1]    Objective:   Physical Exam  Constitutional:       General: He is not in acute distress.  Neurological:      Mental Status: He is alert.         Assessment & Plan:   1. Tension headache   -Explained to him that headaches is likely tension in nature  - May take Tylenol as needed at this point and monitor  Doubt if this is due to cerebral aneurysms   2. Hiatal hernia   -Continue omeprazole but may take 2 times a  day and may add famotidine in between  - Try to continue to avoid triggers at this point     Call or come in sooner if symptoms worsen or persist      This note was prepared using Dragon Medical voice recognition dictation software. As a result errors may occur. When identified these errors have been corrected. While every attempt is made to correct errors during dictation discrepancies may still exist          No orders of the defined types were placed in this encounter.      Meds This Visit:  Requested Prescriptions      No prescriptions requested or ordered in this encounter       Imaging & Referrals:  None         [1]   Allergies  Allergen Reactions    Augmentin [Amoclan] NAUSEA AND VOMITING     VOMIT    Amoxicillin-Pot Clavulanate NAUSEA ONLY    Dust Mite Extract ITCHING     Eyes. Stuffy nose

## 2025-03-06 DIAGNOSIS — F51.01 PRIMARY INSOMNIA: ICD-10-CM

## 2025-03-07 NOTE — TELEPHONE ENCOUNTER
Requesting Omeprazole 40mg  Last Rx'd 1/15/25 #90 with 0 refills    Requesting Clonazepam 1mg  Last Rx'd 2/6/25 #30 with 0 refills    Last OV: 2/24/25 Telemedicine  RTC: prn    No future appointments.      Controlled med:  Rx pended and routed for approval/denial

## 2025-03-09 RX ORDER — CLONAZEPAM 1 MG/1
1 TABLET ORAL NIGHTLY PRN
Qty: 30 TABLET | Refills: 0 | Status: SHIPPED | OUTPATIENT
Start: 2025-03-09

## 2025-03-09 RX ORDER — OMEPRAZOLE 40 MG/1
40 CAPSULE, DELAYED RELEASE ORAL DAILY
Qty: 90 CAPSULE | Refills: 0 | Status: SHIPPED | OUTPATIENT
Start: 2025-03-09 | End: 2026-03-04

## 2025-03-22 ENCOUNTER — TELEPHONE (OUTPATIENT)
Dept: FAMILY MEDICINE CLINIC | Facility: CLINIC | Age: 50
End: 2025-03-22

## 2025-03-22 NOTE — TELEPHONE ENCOUNTER
Patient is calling after falling down the stairs and rolling his left ankle inward. He has pain, swelling, bruising and tenderness in his left ankle. He has left renal cell carcinoma and had radical nephrectomy- nsaids are contraindicated. He does have pain with weight bearing.     I advised him to schedule an appointment with PCP on Monday or go to urgent care to get xrays/possible boot.     -Ice packs applied for 20 minutes or ice massage for 8 minutes three times a day  -Rest and elevation  -Crutch walking for 2 to 3 days  -Applied ace wrap in the office  -may take tylenol extre strength for pain.     Advised to call if there are any questions or changes to his condition.

## 2025-03-23 ENCOUNTER — HOSPITAL ENCOUNTER (OUTPATIENT)
Age: 50
Discharge: HOME | End: 2025-03-23
Attending: EMERGENCY MEDICINE
Payer: COMMERCIAL

## 2025-03-23 ENCOUNTER — APPOINTMENT (OUTPATIENT)
Dept: GENERAL RADIOLOGY | Age: 50
End: 2025-03-23
Attending: PHYSICIAN ASSISTANT
Payer: COMMERCIAL

## 2025-03-23 ENCOUNTER — APPOINTMENT (OUTPATIENT)
Dept: GENERAL RADIOLOGY | Age: 50
End: 2025-03-23
Attending: EMERGENCY MEDICINE
Payer: COMMERCIAL

## 2025-03-23 VITALS
BODY MASS INDEX: 24 KG/M2 | TEMPERATURE: 98 F | SYSTOLIC BLOOD PRESSURE: 116 MMHG | WEIGHT: 185 LBS | OXYGEN SATURATION: 97 % | HEART RATE: 67 BPM | RESPIRATION RATE: 18 BRPM | DIASTOLIC BLOOD PRESSURE: 71 MMHG

## 2025-03-23 DIAGNOSIS — S93.402A MODERATE LEFT ANKLE SPRAIN, INITIAL ENCOUNTER: Primary | ICD-10-CM

## 2025-03-23 PROCEDURE — 73630 X-RAY EXAM OF FOOT: CPT | Performed by: EMERGENCY MEDICINE

## 2025-03-23 PROCEDURE — 99204 OFFICE O/P NEW MOD 45 MIN: CPT

## 2025-03-23 PROCEDURE — 99214 OFFICE O/P EST MOD 30 MIN: CPT

## 2025-03-23 PROCEDURE — 73610 X-RAY EXAM OF ANKLE: CPT | Performed by: EMERGENCY MEDICINE

## 2025-03-23 RX ORDER — HYDROCODONE BITARTRATE AND ACETAMINOPHEN 5; 325 MG/1; MG/1
1-2 TABLET ORAL EVERY 6 HOURS PRN
Qty: 10 TABLET | Refills: 0 | Status: SHIPPED | OUTPATIENT
Start: 2025-03-23 | End: 2025-03-28

## 2025-03-23 NOTE — ED PROVIDER NOTES
Patient Seen in: [facility]      History     Chief Complaint   Patient presents with    Leg or Foot Injury     Stated Complaint: Ankle/Foot Pain    Subjective:   HPI      49-year-old male sustained an inversion injury to the left ankle yesterday afternoon and presents for pain, swelling and difficulty bearing weight.  No other physical complaints.    Objective:     Past Medical History:    Abdominal hernia    Had surgery back in  I believe    Abdominal pain    Anxiety    Arthritis of right knee    Belching    Bloating    Chest pain    Decorative tattoo    Deviated septum    Chronic sinus     Dyslipidemia    Eczema    Flatulence/gas pain/belching    GERD (gastroesophageal reflux disease)    2012    Headache disorder    Dealt with migraines since I was a child    Heartburn    Diet dependant    History of kidney cancer    Intractable vomiting with nausea, unspecified vomiting type    Irregular bowel habits    BERTO (obstructive sleep apnea)    Severe on cpap     PONV (postoperative nausea and vomiting)    Primary insomnia    Right inguinal hernia    Fat hernia    Sleep apnea    post surgical    Unspecified internal derangement of knee    R      Vitamin D deficiency    Vomiting    Wears glasses              Past Surgical History:   Procedure Laterality Date    Create eardrum opening,gen anesth      Hernia surgery Right 2017    Nephrectomy      Other      JAW WIRED SHUT  1990S    Sinus surgery        april ent    Tonsillectomy  2014    Upper gi endoscopy performed  19= Hiatal hernia                Social History     Socioeconomic History    Marital status:    Tobacco Use    Smoking status: Former     Current packs/day: 0.00     Average packs/day: 1 pack/day for 10.0 years (10.0 ttl pk-yrs)     Types: Cigarettes     Start date: 2003     Quit date: 2013     Years since quittin.6    Smokeless tobacco: Never   Vaping Use    Vaping status: Never Used   Substance  and Sexual Activity    Alcohol use: Never    Drug use: Yes     Types: Cannabis     Comment: social     Social Drivers of Health     Food Insecurity: Patient Declined (4/22/2024)    Received from [facility]    Hunger Vital Sign     Worried About Running Out of Food in the Last Year: Patient declined     Ran Out of Food in the Last Year: Patient declined   Transportation Needs: Patient Declined (4/22/2024)    Received from [facility]    PRAPARE - Transportation     Lack of Transportation (Medical): Patient declined     Lack of Transportation (Non-Medical): Patient declined   Housing Stability: Low Risk  (4/22/2024)    Received from [facility]    Housing Stability Vital Sign     Unable to Pay for Housing in the Last Year: No     Number of Places Lived in the Last Year: 1     Unstable Housing in the Last Year: No              Review of Systems    Positive for stated complaint: Ankle/Foot Pain  Other systems are as noted in HPI.  Constitutional and vital signs reviewed.      All other systems reviewed and negative except as noted above.    Physical Exam     ED Triage Vitals [03/23/25 1013]   /71   Pulse 67   Resp 18   Temp 98.2 °F (36.8 °C)   Temp src Oral   SpO2 97 %   O2 Device None (Room air)       Current Vitals:   Vital Signs  BP: 116/71  Pulse: 67  Resp: 18  Temp: 98.2 °F (36.8 °C)  Temp src: Oral    Oxygen Therapy  SpO2: 97 %  O2 Device: None (Room air)        Physical Exam     General Appearance: This is a middle-age male sitting on a gurney.  Vital signs were reviewed per nurses notes.  Left lower extremity: Dorsal pedal pulses present.  Capillary refill to the digits is brisk.  There is significant left ankle soft tissue swelling especially laterally.  There is tenderness over the lateral malleolus.  Medial malleolus, proximal fibula and foot are atraumatic.  No open wounds were noted.  ED Course   Labs Reviewed - No data to display          XR FOOT, COMPLETE (MIN 3 VIEWS), LEFT (CPT=73630)    Result Date: 3/23/2025  PROCEDURE:  XR FOOT, COMPLETE (MIN 3 VIEWS), LEFT (CPT=73630)  TECHNIQUE:  AP, oblique, and lateral views were obtained.  COMPARISON:  None.  INDICATIONS:  Ankle/Foot Pain  PATIENT STATED HISTORY: (As transcribed by Technologist)  Tripped over dog gate.  Pain and swelling proximal metatarsal area.    FINDINGS:  No evidence of acute displaced fracture or dislocation. Normal mineralization.            CONCLUSION:  No evidence of acute displaced fracture or dislocation.   LOCATION:  [place]   Dictated by (CST): Stromberg, LeRoy, MD on 3/23/2025 at 12:44 PM     Finalized by (CST): Stromberg, LeRoy, MD on 3/23/2025 at 12:45 PM       XR ANKLE (MIN 3 VIEWS), LEFT (CPT=73610)    Result Date: 3/23/2025  PROCEDURE:  XR ANKLE (MIN 3 VIEWS), LEFT (CPT=73610)  TECHNIQUE:  Three views were obtained.  COMPARISON:  None.  INDICATIONS:  Ankle/Foot Pain  PATIENT STATED HISTORY: (As transcribed by Technologist)  Pain and swelling AP/Lat ankle.    FINDINGS:  No acute fracture or dislocation.  Severe diffuse swelling around the ankle is noted well corticated body along the lateral aspect of the ankle is nonspecific.  Foreign body cannot be completely excluded            CONCLUSION:  Diffuse soft tissue swelling around the ankle is noted.  No definite acute fracture.   LOCATION:  [facility]   Dictated by (CST): Aldair Cortes MD on 3/23/2025 at 12:33 PM     Finalized by (CST): Aldair Cortes MD on 3/23/2025 at 12:34 PM       I personally reviewed the images myself and went over results with patient.    X-rays of the foot were ordered by mistake by nursing staff.  I reviewed the ankle films myself and there is no acute fracture.    Stirrup splint was applied and crutches were dispensed.  Test results and treatment plan were discussed.       MDM      #1.  Right ankle sprain.  No evidence of bony injury.  Discharged home with RICE.        Medical Decision  Making      Disposition and Plan     Clinical Impression:  1. Moderate left ankle sprain, initial encounter         Disposition:  Discharge  3/23/2025 11:34 am    Follow-up:  Pancho Domingo MD  [address] [place] [id number]  [phone]    Call   As needed          Medications Prescribed:  Discharge Medication List as of 3/23/2025 11:47 AM        START taking these medications    Details   HYDROcodone-acetaminophen 5-325 MG Oral Tab Take 1-2 tablets by mouth every 6 (six) hours as needed for Pain., Normal, Disp-10 tablet, R-0                 Supplementary Documentation:

## 2025-03-23 NOTE — DISCHARGE INSTRUCTIONS
Stirrup splint, crutches weightbearing as tolerated.    Elevate at or above the level of your heart when you are sitting or lying down for the next couple of days.    Ice intermittently, 20 minutes on and 20 minutes off.

## 2025-03-26 ENCOUNTER — TELEMEDICINE (OUTPATIENT)
Dept: FAMILY MEDICINE CLINIC | Facility: CLINIC | Age: 50
End: 2025-03-26
Payer: COMMERCIAL

## 2025-03-26 DIAGNOSIS — S93.402D SPRAIN OF LEFT ANKLE, UNSPECIFIED LIGAMENT, SUBSEQUENT ENCOUNTER: Primary | ICD-10-CM

## 2025-03-26 PROCEDURE — 98005 SYNCH AUDIO-VIDEO EST LOW 20: CPT | Performed by: FAMILY MEDICINE

## 2025-03-26 NOTE — PROGRESS NOTES
Subjective:   Patient ID: Hugo Szymanski is a 49 year old male.    HPI  Mr. Szymanski is a very pleasant 49-year-old gentleman with history of renal cell carcinoma of the left kidney status post nephrectomy, GERD, anxiety presenting today via video visit for left ankle pain and swelling which he sustained this past Saturday.  He had accidentally rolled that ankle in giving him pain, swelling and bruising.  This prompted him to go to the emergency room.  He was unable to bear weight initially.  Left ankle x-ray was done which showed soft tissue swelling but otherwise no acute fracture or dislocation.  He has been vigorously icing, wrapping and elevating that ankle.  He is able to bear some weight at this point.  He is wondering if boot would be effective.    I had reviewed past medical and family histories together with allergy and medication lists documented.    History/Other:   Review of Systems   Musculoskeletal:  Positive for arthralgias and joint swelling.     Current Outpatient Medications   Medication Sig Dispense Refill    HYDROcodone-acetaminophen 5-325 MG Oral Tab Take 1-2 tablets by mouth every 6 (six) hours as needed for Pain. 10 tablet 0    Omeprazole 40 MG Oral Capsule Delayed Release Take 1 capsule (40 mg total) by mouth daily. 90 capsule 0    clonazePAM 1 MG Oral Tab Take 1 tablet (1 mg total) by mouth nightly as needed for Anxiety. 30 tablet 0    cetirizine 5 MG Oral Tab Take 1 tablet (5 mg total) by mouth daily.      Multiple Vitamin (MULTI-VITAMIN DAILY) Oral Tab Take 1 tablet by mouth daily.       Allergies:Allergies[1]    Objective:   Physical Exam  Constitutional:       General: He is not in acute distress.  Neurological:      Mental Status: He is alert.         Assessment & Plan:   1. Sprain of left ankle, unspecified ligament, subsequent encounter    -Clinically improved as he is able to bear some weight at this point  - continue with ice, elevation and wrapping  - May benefit from using boot  for the next few days.  I will try to send it to the pharmacy but also had asked him to check [employer] for availability.  - If no positive improvement next week we may need to refer him to orthopedics to be evaluated further    This note was prepared using Dragon Medical voice recognition dictation software. As a result errors may occur. When identified these errors have been corrected. While every attempt is made to correct errors during dictation discrepancies may still exist            No orders of the defined types were placed in this encounter.      Meds This Visit:  Requested Prescriptions      No prescriptions requested or ordered in this encounter       Imaging & Referrals:  None         [1]   Allergies  Allergen Reactions    Augmentin [Amoclan] NAUSEA AND VOMITING     VOMIT    Amoxicillin-Pot Clavulanate NAUSEA ONLY    Dust Mite Extract ITCHING     Eyes. Stuffy nose

## 2025-04-08 DIAGNOSIS — F51.01 PRIMARY INSOMNIA: ICD-10-CM

## 2025-04-08 RX ORDER — CLONAZEPAM 1 MG/1
1 TABLET ORAL NIGHTLY PRN
Qty: 30 TABLET | Refills: 0 | Status: SHIPPED | OUTPATIENT
Start: 2025-04-08

## 2025-04-08 NOTE — TELEPHONE ENCOUNTER
Requesting Clonazepam 1mg  Last OV: 3/26/25 Telemedicine  RTC: prn  Last Rx'd 3/9/25 #30 with 0 refills    No future appointments.    Per [place] , last dispensed 3/7/25 #30    Controlled med:  Rx pended and routed for approval/denial

## 2025-05-08 DIAGNOSIS — F51.01 PRIMARY INSOMNIA: ICD-10-CM

## 2025-05-08 RX ORDER — CLONAZEPAM 1 MG/1
1 TABLET ORAL NIGHTLY PRN
Qty: 30 TABLET | Refills: 0 | Status: SHIPPED | OUTPATIENT
Start: 2025-05-08

## 2025-05-08 RX ORDER — OMEPRAZOLE 40 MG/1
40 CAPSULE, DELAYED RELEASE ORAL 2 TIMES DAILY
Qty: 180 CAPSULE | Refills: 0 | Status: SHIPPED | OUTPATIENT
Start: 2025-05-08

## 2025-05-08 NOTE — TELEPHONE ENCOUNTER
Omeprazole Rx sent per protocol    Requesting Clonazepam 1mg  Last OV: 4/8/25  RTC: prn  Last Rx'd 4/8/25 #30 with 0 refills    No future appointments.    [place] , last dispensed 4/8/25 #30    Controlled med:  Rx pended and routed for approval/denial       1 pair

## 2025-06-07 DIAGNOSIS — F51.01 PRIMARY INSOMNIA: ICD-10-CM

## 2025-06-09 RX ORDER — CLONAZEPAM 1 MG/1
1 TABLET ORAL NIGHTLY PRN
Qty: 30 TABLET | Refills: 0 | Status: SHIPPED | OUTPATIENT
Start: 2025-06-09

## 2025-06-09 NOTE — TELEPHONE ENCOUNTER
Requesting Clonazepam 1mg  Last OV: 3/26/25 Telemedicnie  RTC: prn  Last Rx'd 5/8/25 #30 with 0 refills    No future appointments.    Per [place] , last dispensed 5/6/25 #30    Controlled med:  Rx pended and routed for approval/denial

## 2025-07-09 DIAGNOSIS — F51.01 PRIMARY INSOMNIA: ICD-10-CM

## 2025-07-10 RX ORDER — CLONAZEPAM 1 MG/1
1 TABLET ORAL NIGHTLY PRN
Qty: 30 TABLET | Refills: 0 | OUTPATIENT
Start: 2025-07-10

## 2025-07-10 NOTE — TELEPHONE ENCOUNTER
Requesting Clonazepam 1mg  Last OV: 3/26/25 Telemedicine  RTC: prn  Last Rx'd 6/9/25 #30 with 0 refills    No future appointments.    Per [place] , last dispensed 7/7/25 #30    Patient just picked up a 30 day prescription on 7/7/25. Request denied, refill too soon.

## 2025-08-06 DIAGNOSIS — F51.01 PRIMARY INSOMNIA: ICD-10-CM

## 2025-08-06 RX ORDER — CLONAZEPAM 1 MG/1
1 TABLET ORAL NIGHTLY PRN
Qty: 30 TABLET | Refills: 0 | Status: SHIPPED | OUTPATIENT
Start: 2025-08-06

## 2025-08-06 RX ORDER — OMEPRAZOLE 40 MG/1
40 CAPSULE, DELAYED RELEASE ORAL 2 TIMES DAILY
Qty: 180 CAPSULE | Refills: 0 | OUTPATIENT
Start: 2025-08-06

## (undated) DEVICE — ROBOTIC GENERAL: Brand: MEDLINE INDUSTRIES, INC.

## (undated) DEVICE — ADHESIVE SKIN TOP FOR WND CLSR DERMBND ADV

## (undated) DEVICE — AIRSEAL TRI-LUMEN LILTERED TUBE SET: Brand: AIRSEAL

## (undated) DEVICE — 2, DISPOSABLE SUCTION/IRRIGATOR WITHOUT DISPOSABLE TIP: Brand: STRYKEFLOW

## (undated) DEVICE — FENESTRATED BIPOLAR FORCEPS: Brand: ENDOWRIST

## (undated) DEVICE — CLIP INT L POLYMER LOK LIG HEM O LOK

## (undated) DEVICE — AIRSEAL 12 MM ACCESS PORT AND PALM GRIP OBTURATOR WITH BLADELESS OPTICAL TIP, 120 MM LENGTH: Brand: AIRSEAL

## (undated) DEVICE — SUTURE VCRL SZ 0 L54IN ABSRB UD POLYGLACTIN

## (undated) DEVICE — BLADELESS OBTURATOR: Brand: WECK VISTA

## (undated) DEVICE — STERILE POLYISOPRENE POWDER-FREE SURGICAL GLOVES: Brand: PROTEXIS

## (undated) DEVICE — SUTURE MCRYL SZ 4-0 L18IN ABSRB UD L19MM PS-2

## (undated) DEVICE — ABSORBABLE HEMOSTAT (OXIDIZED REGENERATED CELLULOSE): Brand: SURGICEL

## (undated) DEVICE — INSUFFLATION NEEDLE TO ESTABLISH PNEUMOPERITONEUM.: Brand: INSUFFLATION NEEDLE

## (undated) DEVICE — ARM DRAPE

## (undated) DEVICE — STAPLER MED SHFT L340MM JAW L60MM STD ECHELON

## (undated) DEVICE — LIGHT HANDLE

## (undated) DEVICE — SUTURE VCRL SZ 0 L27IN ABSRB VLT L26MM UR-6

## (undated) DEVICE — COLUMN DRAPE

## (undated) DEVICE — #15 STERILE STAINLESS BLADE: Brand: STERILE STAINLESS BLADES

## (undated) DEVICE — TIP-UP FENESTRATED GRASPER: Brand: ENDOWRIST

## (undated) DEVICE — SOLUTION IV 1000ML DIL ST H2O

## (undated) DEVICE — SUTURE VCRL SZ 3-0 L27IN ABSRB UD L26MM SH

## (undated) DEVICE — CANNULA SEAL

## (undated) DEVICE — ENDOPATH ECHELON ENDOSCOPIC LINEAR CUTTER RELOADS, WHITE, 60MM: Brand: ECHELON ENDOPATH

## (undated) DEVICE — LAPAROVUE VISIBILITY SYSTEM LAPAROSCOPIC SOLUTIONS: Brand: LAPAROVUE

## (undated) DEVICE — TIP COVER ACCESSORY

## (undated) DEVICE — TISSUE RETRIEVAL SYSTEM: Brand: INZII RETRIEVAL SYSTEM

## (undated) DEVICE — MONOPOLAR CURVED SCISSORS: Brand: ENDOWRIST

## (undated) DEVICE — TRAY CATH 16FR F INCL BARDX IC COMPLT CARE

## (undated) NOTE — Clinical Note
Leticia Barahona was seen for his 1 month f/u at [facility] with a total weight loss of 5# since initial consult. Increased anxiety with phentermine, trial lifestyle changes.

## (undated) NOTE — MR AVS SNAPSHOT
[place] [id number]               Thank you for choosing us for your health care visit with GAVIOTA Lord.   We are glad to serve you and happy to provide you with this summar This means that if you were to build 5 lbs of muscle and lose 5 lbs of fat, you would weigh exactly the same, but look smaller and firmer. So imagine if it were 25 lbs or 50 lbs of lost fat vs muscle gained.   This is why it’s possible for you to lose fat Having insomnia means you often have trouble falling or staying asleep or going back to sleep if you awaken. Insomnia can be either a short-term or a long-term problem. Insomnia affects 1 in 3 adults every year in the [address] [place] [address],[address]. How does it occur?    Causes of trouble falling asleep (taking longer than 45 minutes)   awakening often in the night   waking up early in the morning and being unable to go back to sleep   not feeling rested in the morning or feeling tired during the day   restlessness or anxiety as bed prescribe medicine to help you sleep until the stressful event is over or resolved. Counseling may also help you deal with psychological problems or reduce stress that may cause or contribute to your insomnia. Some sleeping medicine can be addictive.  You Eat lightly at the evening meal and avoid snacks after supper. Lose weight if you are overweight. Learn to use relaxation exercises. Meditate for 20 minutes before you go to bed.    Read something light or entertaining just before you go to bed, to ge Commonly known as:  ATROVENT           Montelukast Sodium 10 MG Tabs   Take 1 tablet (10 mg total) by mouth nightly. Commonly known as:  SINGULAIR           VSL#3 Caps   Take 1 capsule by mouth daily.                    MyChart     Call the helpdesk for a

## (undated) NOTE — LETTER
[address]  [address] [address], [address] [address] [phone]     Patient: Lori French   YOB: 1975   Date of Visit: 11/3/2020     Dear Employer,        November 3, 2020    At [facility], we are taking spe Persons infected with SARS-CoV-2 who never develop COVID-19 symptoms may discontinue isolation and other precautions 10 days after the date of their first positive RT-PCR test for SARS-CoV-2 RNA.     Persons who are asymptomatic but have been exposed, CDC r

## (undated) NOTE — Clinical Note
Patient was seen for their 2 month f/u at [facility] with a total weight loss of 11# since initial consult.

## (undated) NOTE — MR AVS SNAPSHOT
[address] [phone]               Thank you for choosing us for your health care visit with Williams Mazariegos RD.   We are glad to serve you and happy to provide you with this summary of

## (undated) NOTE — Clinical Note
I had the pleasure of seeing Mr. Szymanski in my office today. Please see my attached note.       Trav Boateng

## (undated) NOTE — Clinical Note
Patient was seen for their 3 month f/u at [facility] with a total weight loss of 8# since initial consult. Continues with lifestyle modification, not interested in adjunct medication.

## (undated) NOTE — LETTER
04/13/18        Elsie King  [address] [place] [address]      Dear Leonarda Roe,    [facility] records indicate that you have outstanding lab work and or testing that was ordered for you and has not yet been completed:        Occult Blood, Fecal, I

## (undated) NOTE — MR AVS SNAPSHOT
[address] [phone]               Thank you for choosing us for your health care visit with GAVIOTA Lomeli.   We are glad to serve you and happy to provide you with this summar prediabetes, it hasn’t reached this point yet, but it is higher than normal. It is vital to make lifestyle changes to lower your blood sugar, improve your health, and prevent diabetes. This sheet will tell you more. Why worry about prediabetes?   Predi Diabetes is 200 mg/dL or higher. · Hemoglobin A1c (HbA1c). Your HbA1c is normal if it is below 5.7%. Prediabetes is 5.7% to 6.4%.  Diabetes is 6.5% or higher.   Treating prediabetes  The best way to treat prediabetes is to lose at least 5% to 7% of your cu habits to manage your weight. All the values on the label are based on one serving. The serving size is the average portion. Remember to multiply the values on the label by the number of servings you eat.      Eat more fiber  High-fiber foods are digest [address] [facility] [id number] [phone]              Allergies as of Feb 01, 2017     Augmentin [Amoclan]     VOMIT                Today's Vital Signs     BP Pulse Height Weight BMI    106/60 mmHg 70 73\" 225 lb 29.69 kg/m2         Current Medications

## (undated) NOTE — Clinical Note
Thank you for referring Elodia Rodas to the [facility]. I met with him in consultation today. I have ordered labs, set up a nutrition consultation with our dietician, and completed an EKG.   He was started on phentermine for medication therapy and

## (undated) NOTE — MR AVS SNAPSHOT
[facility] [address]  [address] [facility] Margarita Phipps  [phone]               Thank you for choosing us for your health care visit with Andrei Gill MD.  We are glad to serve you and happy to provide you with t [address]da [address] [phone]   Phone:  [phone]   Fax:  [phone]    Diagnoses:  Chronic frontal sinusitis   Order:  Ent - Internal    Carly Carter MD   [address] [facility],Fourth Floor   [address] [place] [id number]   Phone:  [phone]   Fax:  [phone] schedule your appointment. Failure to obtain required authorization numbers can create reimbursement difficulties for you.       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 The agents included placebo, eszopiclone (1, 2, 2.5, and 3 mg), and zolpidem (10 mg).  Compared with placebo, eszopiclone at doses of 2.5 and 3 mg decreased the median wake time after sleep onset, but zolpidem and eszopiclone at dose nonbenzodiazepines (specifically, zaleplon and zolpidem) tend to be less expensive than the other nonbenzodiazepines and ramelteon. The adverse effects are discussed below.  (See 'Risks and side effects' below.)  Benzodiazepines — Benzodiazepines are a clas have a structure that is different from the benzodiazepines and includes more targeted action at one ALEXANDR type A receptor. A consequence of their greater specificity is less anxiolytic and anticonvulsant activity.   Nonbenzodiazepines appear to improve both available prior to driving. In January of 2013, the [employer] and Drug Administration (FDA) issued a safety announcement recommending use of a lower dose in women than had been previously recommended [71]. This should also be considered in men.  (See 'Dosing nonbenzodiazepines as a class (headache, dizziness, parasomnias, next-day impairment in some patients) [63,75]. (See 'Adverse effects of nonbenzodiazepines' below.)  Sleep may be worse on the first night after discontinuation of this medication.  Eszopiclon sleep onset [79].  Although most studies examined short-term treatment and outcome in middle-aged adults [79], a small number of individual trials have demonstrated persistence of subjective benefit for at least six months, and improvement in older adults [ The efficacy of suvorexant was demonstrated in a multicenter international trial of 781 patients with primary insomnia who were randomly assigned to receive nightly suvorexant or placebo in a 2:1 ratio for one year, followed by a two-month randomized disco schedule C-IV controlled substance. The most common side effect is daytime somnolence. Like other sedatives, it has the potential to worsen sleep-disordered breathing in vulnerable patients.  (See 'Adverse effects of orexin antagonists' below.)  The role fo to receive trazodone, zolpidem, or placebo for two weeks [99]. After one week of therapy, trazodone improved subjective sleep latency, sleep duration, wake time after sleep onset, and number of awakenings, compared with placebo.  However, after two weeks, t barbiturates to treat insomnia is not recommended [1]. Over-the-counter — Herbal products, hormones, and alcoholic beverages have been used as sleep aids by patients with insomnia but there are sparse data upon which to evaluate their efficacy.  These agen promotes upper airway instability and sleep apnea. These negative effects, coupled with the significant risk of dependence and interaction with other medications, preclude the use of alcohol to treat insomnia [108].  (See \"Insomnia in patients with a subst association between the agent and the side effect [64].  In one study, zolpidem accounted for 12 percent of all emergency department visits for adverse drug events related to psychiatric medication in the [facility] over the period of 2009 to 2011, and 2 activities that require mental alertness the day after taking the drug [73].  These recommendations were based on studies showing that blood levels of zolpidem above about 50 ng/mL appeared capable of impairing driving sufficiently to increase the risk of a suvorexant are daytime somnolence and headache. As with other hypnotics, next-day driving may be impaired due to the long half-life of the drug [117].  Other potential side effects include sleepwalking, REM sleep behavior disorder, suicidal ideation, and th of nighttime awakening [124]. However, the magnitude of these benefits was relatively small compared with the two- to fivefold increase in adverse cognitive or psychomotor events.  This suggests that additional caution is necessary when deciding whether pha and, therefore, does not indicate causality.  Chronic insomnia has been associated with a variety of medical and psychiatric comorbidities, many of which are associated with premature mortality, and it is difficult to exclude residual confounding by indicat cognitive behavioral therapy for insomnia (CBT-I) and a medication, usually for six to eight weeks. The medication is then tapered off or to an as-needed schedule, while continuing the CBT-I.   Two trials from the same investigators illustrate the effects o versus 43 percent). Taken together, the evidence indicates that CBT-I alone, drug therapy alone, and combination therapy all improve measures of insomnia (eg, wake time after sleep onset) within weeks of initiating the therapy.  Continuing CBT-I alone afte and Sat/Sun 9am-430pm  Also at [address]  Call [phone] for info    • Please call our office about any questions regarding your treatment/medicines/tests as a result of today's visit.  For your safety, read the entire package insert of BP Pulse Temp Height Weight BMI    134/82 mmHg 64 97.5 °F (36.4 °C) (Temporal) 73\" 222 lb 29.30 kg/m2         Current Medications          This list is accurate as of: 5/5/17  9:57 AM.  Always use your most recent med list.                azithromycin 25 [employer]     Call the [employer] for assistance with your inactive [employer] account    If you have questions, you can call [phone] to talk to our [facility] Staff. Remember, [employer] is NOT to be used for urgent needs.   For medical emergenc

## (undated) NOTE — LETTER
[facility] [address], [facility]   Date:   4/4/2023     Name:   Ofelia Cazares    YOB: 1975   MRN:   [id number]       WHERE IS YOUR PAIN NOW? Tamela the areas on your body where you feel the described sensations. Use the appropriate symbol. Joseluis Briseno the areas of radiation. Include all affected areas. Just to complete the picture, please draw in the face. ACHE:  ^ ^ ^   NUMBNESS:  0000   PINS & NEEDLES:  = = = =                              ^ ^ ^                       0000              = = = =                                    ^ ^ ^                       0000            = = = =      BURNING:  XXXX   STABBING: ////                  XXXX                ////                         XXXX          ////     Please tamela the line below indicating your degree of pain right now  with 0 being no pain 10 being the worst pain possible.                                          0             1             2              3             4              5              6              7             8             9             10         Patient Signature:

## (undated) NOTE — MR AVS SNAPSHOT
[address] [phone]               Thank you for choosing us for your health care visit with GAVIOTA Shay.   We are glad to serve you and happy to provide you with this summar eating. Snacks should come from one or more of the following food groups: grains, fruits, vegetables, dairy, and protein. They should also be high in nutrients, and low in fat, sugar, and salt.  When snacking, it’s also important to watch how much you’re ea **The Hunger Rating Scale can help you decide if you are experiencing real hunger. Remember that physical hunger builds gradually over time (usually over several hours after a meal), whereas emotional eating and cravings usually come on very suddenly.  Whe 4 = Beginning signs and symptoms of hunger    3 = Hungry with several hunger symptoms, ready to eat    2 = Very hungry, unable to concentrate    Hungry - 1    1 = Starving, dizzy, irritable     ______________________________________________________________

## (undated) NOTE — Clinical Note
I had the pleasure of seeing Mr. Szymanski in my office today. Please see my attached note.       Anna Sanchez

## (undated) NOTE — MR AVS SNAPSHOT
[address], [address] [place] [address] [facility] [phone]               Thank you for choosing us for your health care visit with GAVIOTA Heard.   We are glad to serve you and happy to provide you with this summar options when having them. 4.  Start a daily probiotic: VSL#3, Florajen3 or Florastor.       Please download aidan MyFitness Pal or [employer] Ring! to monitor daily dietary intake and you will be able to see if you are eating the right amount of calories or too much Take 1 tablet (0.25 mg total) by mouth nightly as needed. Commonly known as:  KLONOPIN           Ipratropium Bromide 0.03 % Soln   2 sprays by Nasal route every 12 (twelve) hours.    Commonly known as:  ATROVENT           Montelukast Sodium 10 MG Tabs   T Therapeutic drug monitoring [083848]  Obesity (BMI 30-39. 9) [282303]           Therapeutic drug monitoring [958426]  Obesity (BMI 30-39. 9) [920124]      Results of Recent Testing     ELECTROCARDIOGRAM, COMPLETE             MyChart

## (undated) NOTE — Clinical Note
I had the pleasure of seeing Elva Potts on 10/31/2022. Please see my attached note.   Melissa Burns MD FACS [facility]--Surgery